# Patient Record
Sex: FEMALE | Race: WHITE | Employment: OTHER | URBAN - METROPOLITAN AREA
[De-identification: names, ages, dates, MRNs, and addresses within clinical notes are randomized per-mention and may not be internally consistent; named-entity substitution may affect disease eponyms.]

---

## 2018-09-10 ENCOUNTER — HOSPITAL ENCOUNTER (EMERGENCY)
Age: 83
Discharge: HOME | End: 2018-09-10
Attending: EMERGENCY MEDICINE
Payer: MEDICARE

## 2018-09-10 VITALS
HEART RATE: 68 BPM | DIASTOLIC BLOOD PRESSURE: 79 MMHG | TEMPERATURE: 98.3 F | RESPIRATION RATE: 20 BRPM | SYSTOLIC BLOOD PRESSURE: 140 MMHG | OXYGEN SATURATION: 97 %

## 2018-09-10 DIAGNOSIS — S76.011A STRAIN OF FLEXOR MUSCLE OF RIGHT HIP, INITIAL ENCOUNTER: Primary | ICD-10-CM

## 2018-09-10 PROCEDURE — 6370000000 HC RX 637 (ALT 250 FOR IP): Performed by: STUDENT IN AN ORGANIZED HEALTH CARE EDUCATION/TRAINING PROGRAM

## 2018-09-10 PROCEDURE — 99283 EMERGENCY DEPT VISIT LOW MDM: CPT

## 2018-09-10 RX ORDER — IBUPROFEN 400 MG/1
400 TABLET ORAL ONCE
Status: COMPLETED | OUTPATIENT
Start: 2018-09-10 | End: 2018-09-10

## 2018-09-10 RX ADMIN — IBUPROFEN 400 MG: 400 TABLET, FILM COATED ORAL at 11:08

## 2018-09-10 ASSESSMENT — PAIN DESCRIPTION - DESCRIPTORS: DESCRIPTORS: ACHING

## 2018-09-10 ASSESSMENT — PAIN DESCRIPTION - FREQUENCY: FREQUENCY: INTERMITTENT

## 2018-09-10 ASSESSMENT — PAIN SCALES - GENERAL
PAINLEVEL_OUTOF10: 7
PAINLEVEL_OUTOF10: 4
PAINLEVEL_OUTOF10: 8

## 2018-09-10 ASSESSMENT — PAIN DESCRIPTION - ORIENTATION: ORIENTATION: RIGHT

## 2018-09-10 ASSESSMENT — PAIN DESCRIPTION - LOCATION: LOCATION: KNEE

## 2018-09-10 NOTE — ED PROVIDER NOTES
ED Attending Attestation Note     Date of evaluation: 9/10/2018    This patient was seen by the resident. I have seen and examined the patient, agree with the workup, evaluation, management and diagnosis. The care plan has been discussed. My assessment reveals patient complaining right lateral thigh and knee pain, She denies any trauma or falls. On exam no pain ROM right hip and no swelling tenderness of knee. I spoke at length with pt's son and pt about x-rays of the hip/back and femur but they refused. It was decided (SDM) to treat pt symptomatically and have her follow up with her PCP.      Rashmi Smith MD  09/10/18 0746
regular rhythm, normal heart sounds and intact distal pulses. Pulmonary/Chest: Effort normal and breath sounds normal. She has no wheezes. She has no rales. Abdominal: Soft. She exhibits no distension. There is no tenderness. There is no rebound and no guarding. Musculoskeletal: She exhibits no edema or deformity. Normal range of motion in bilateral hips and knees. No pain currently. No pain with ROM of hip and knee. Negative log roll of bilateral lower extremities. Pelvis stable. No pain with weight bearing. Neurological:   - Alert and oriented to person, place, time and situation  - Symmetric 5/5 strength in the bilateral hip flexion, knee flex/ext, ankle dorsiflexion/plantarflexion  - Sensation to soft touch intact in the  lower extremities bilaterally  -  normal gait without ataxia       Skin: Skin is warm and dry. No rash noted. She is not diaphoretic. No erythema. No pallor. Psychiatric: She has a normal mood and affect. Her behavior is normal.       Diagnostic Results     EKG   Not done. RADIOLOGY:  No orders to display       LABS:   No results found for this visit on 09/10/18. ED BEDSIDE ULTRASOUND:  Not done    RECENT VITALS:  BP: (!) 140/79, Temp: 98.3 °F (36.8 °C), Pulse: 68, Resp: 20, SpO2: 97 %     Procedures     none    ED Course     Nursing Notes, Past Medical Hx, Past Surgical Hx, Social Hx, Allergies, and Family Hx were reviewed. The patient was given the following medications:  Orders Placed This Encounter   Medications    ibuprofen (ADVIL;MOTRIN) tablet 400 mg       CONSULTS:  None    MEDICAL DECISION MAKING / ASSESSMENT / PLAN     Izzy Turcios is a 80 y.o. female with a history and presentation as described above in HPI. The patient was evaluated by myself and the ED Attending Physician, No att. providers found. All management and disposition plans were discussed and agreed upon. Appropriate labs and diagnostic studies were reviewed as they were made available.  Pertinent

## 2019-02-26 ENCOUNTER — APPOINTMENT (OUTPATIENT)
Dept: CT IMAGING | Age: 84
DRG: 066 | End: 2019-02-26
Payer: MEDICARE

## 2019-02-26 ENCOUNTER — APPOINTMENT (OUTPATIENT)
Dept: MRI IMAGING | Age: 84
DRG: 066 | End: 2019-02-26
Payer: MEDICARE

## 2019-02-26 ENCOUNTER — APPOINTMENT (OUTPATIENT)
Dept: GENERAL RADIOLOGY | Age: 84
DRG: 066 | End: 2019-02-26
Payer: MEDICARE

## 2019-02-26 ENCOUNTER — HOSPITAL ENCOUNTER (INPATIENT)
Age: 84
LOS: 3 days | Discharge: HOME HEALTH | DRG: 066 | End: 2019-03-01
Attending: EMERGENCY MEDICINE | Admitting: INTERNAL MEDICINE
Payer: MEDICARE

## 2019-02-26 DIAGNOSIS — I16.0 HYPERTENSIVE URGENCY: ICD-10-CM

## 2019-02-26 DIAGNOSIS — I63.81 CEREBROVASCULAR ACCIDENT (CVA) OF RIGHT THALAMUS (HCC): ICD-10-CM

## 2019-02-26 DIAGNOSIS — I67.9 CEREBROVASCULAR DISEASE: ICD-10-CM

## 2019-02-26 DIAGNOSIS — I10 ESSENTIAL HYPERTENSION: ICD-10-CM

## 2019-02-26 DIAGNOSIS — R26.9 ABNORMAL GAIT: Primary | ICD-10-CM

## 2019-02-26 DIAGNOSIS — I65.22 INTERNAL CAROTID ARTERY STENOSIS, LEFT: ICD-10-CM

## 2019-02-26 LAB
AMORPHOUS: ABNORMAL /HPF
ANION GAP SERPL CALCULATED.3IONS-SCNC: 11 MMOL/L (ref 3–16)
APTT: 34.1 SEC (ref 26–36)
BASOPHILS ABSOLUTE: 0.1 K/UL (ref 0–0.2)
BASOPHILS RELATIVE PERCENT: 1.2 %
BILIRUBIN URINE: NEGATIVE
BLOOD, URINE: NEGATIVE
BUN BLDV-MCNC: 20 MG/DL (ref 7–20)
CALCIUM SERPL-MCNC: 8.9 MG/DL (ref 8.3–10.6)
CHLORIDE BLD-SCNC: 106 MMOL/L (ref 99–110)
CLARITY: CLEAR
CO2: 24 MMOL/L (ref 21–32)
COLOR: YELLOW
CREAT SERPL-MCNC: 0.8 MG/DL (ref 0.6–1.2)
EKG ATRIAL RATE: 55 BPM
EKG DIAGNOSIS: NORMAL
EKG P AXIS: 47 DEGREES
EKG P-R INTERVAL: 172 MS
EKG Q-T INTERVAL: 438 MS
EKG QRS DURATION: 92 MS
EKG QTC CALCULATION (BAZETT): 419 MS
EKG R AXIS: 39 DEGREES
EKG T AXIS: 58 DEGREES
EKG VENTRICULAR RATE: 55 BPM
EOSINOPHILS ABSOLUTE: 0.2 K/UL (ref 0–0.6)
EOSINOPHILS RELATIVE PERCENT: 2.8 %
GFR AFRICAN AMERICAN: >60
GFR NON-AFRICAN AMERICAN: >60
GLUCOSE BLD-MCNC: 109 MG/DL (ref 70–99)
GLUCOSE URINE: NEGATIVE MG/DL
HCT VFR BLD CALC: 41.1 % (ref 36–48)
HEMOGLOBIN: 13.7 G/DL (ref 12–16)
INR BLD: 1.04 (ref 0.86–1.14)
KETONES, URINE: NEGATIVE MG/DL
LEUKOCYTE ESTERASE, URINE: ABNORMAL
LYMPHOCYTES ABSOLUTE: 0.9 K/UL (ref 1–5.1)
LYMPHOCYTES RELATIVE PERCENT: 16.1 %
MCH RBC QN AUTO: 30.6 PG (ref 26–34)
MCHC RBC AUTO-ENTMCNC: 33.2 G/DL (ref 31–36)
MCV RBC AUTO: 92 FL (ref 80–100)
MICROSCOPIC EXAMINATION: YES
MONOCYTES ABSOLUTE: 0.9 K/UL (ref 0–1.3)
MONOCYTES RELATIVE PERCENT: 16.5 %
NEUTROPHILS ABSOLUTE: 3.5 K/UL (ref 1.7–7.7)
NEUTROPHILS RELATIVE PERCENT: 63.4 %
NITRITE, URINE: NEGATIVE
PDW BLD-RTO: 14.2 % (ref 12.4–15.4)
PH UA: 6
PLATELET # BLD: 338 K/UL (ref 135–450)
PMV BLD AUTO: 8.5 FL (ref 5–10.5)
POTASSIUM SERPL-SCNC: 4.1 MMOL/L (ref 3.5–5.1)
PROTEIN UA: NEGATIVE MG/DL
PROTHROMBIN TIME: 11.9 SEC (ref 9.8–13)
RBC # BLD: 4.47 M/UL (ref 4–5.2)
RBC UA: ABNORMAL /HPF (ref 0–2)
SODIUM BLD-SCNC: 141 MMOL/L (ref 136–145)
SPECIFIC GRAVITY UA: 1.01
URINE TYPE: ABNORMAL
UROBILINOGEN, URINE: 0.2 E.U./DL
WBC # BLD: 5.5 K/UL (ref 4–11)
WBC UA: ABNORMAL /HPF (ref 0–5)

## 2019-02-26 PROCEDURE — 96374 THER/PROPH/DIAG INJ IV PUSH: CPT

## 2019-02-26 PROCEDURE — 81001 URINALYSIS AUTO W/SCOPE: CPT

## 2019-02-26 PROCEDURE — 70450 CT HEAD/BRAIN W/O DYE: CPT

## 2019-02-26 PROCEDURE — 2500000003 HC RX 250 WO HCPCS: Performed by: STUDENT IN AN ORGANIZED HEALTH CARE EDUCATION/TRAINING PROGRAM

## 2019-02-26 PROCEDURE — 85025 COMPLETE CBC W/AUTO DIFF WBC: CPT

## 2019-02-26 PROCEDURE — 6360000002 HC RX W HCPCS: Performed by: PHYSICIAN ASSISTANT

## 2019-02-26 PROCEDURE — 70498 CT ANGIOGRAPHY NECK: CPT

## 2019-02-26 PROCEDURE — 85730 THROMBOPLASTIN TIME PARTIAL: CPT

## 2019-02-26 PROCEDURE — 6360000004 HC RX CONTRAST MEDICATION: Performed by: EMERGENCY MEDICINE

## 2019-02-26 PROCEDURE — 6370000000 HC RX 637 (ALT 250 FOR IP): Performed by: STUDENT IN AN ORGANIZED HEALTH CARE EDUCATION/TRAINING PROGRAM

## 2019-02-26 PROCEDURE — 6360000002 HC RX W HCPCS: Performed by: STUDENT IN AN ORGANIZED HEALTH CARE EDUCATION/TRAINING PROGRAM

## 2019-02-26 PROCEDURE — 80048 BASIC METABOLIC PNL TOTAL CA: CPT

## 2019-02-26 PROCEDURE — 71046 X-RAY EXAM CHEST 2 VIEWS: CPT

## 2019-02-26 PROCEDURE — 70496 CT ANGIOGRAPHY HEAD: CPT

## 2019-02-26 PROCEDURE — 99223 1ST HOSP IP/OBS HIGH 75: CPT | Performed by: SURGERY

## 2019-02-26 PROCEDURE — 99285 EMERGENCY DEPT VISIT HI MDM: CPT

## 2019-02-26 PROCEDURE — 70551 MRI BRAIN STEM W/O DYE: CPT

## 2019-02-26 PROCEDURE — 85610 PROTHROMBIN TIME: CPT

## 2019-02-26 PROCEDURE — 93005 ELECTROCARDIOGRAM TRACING: CPT | Performed by: PHYSICIAN ASSISTANT

## 2019-02-26 PROCEDURE — 2000000000 HC ICU R&B

## 2019-02-26 RX ORDER — IBUPROFEN 400 MG/1
400 TABLET ORAL NIGHTLY PRN
Status: ON HOLD | COMMUNITY
End: 2019-02-28 | Stop reason: HOSPADM

## 2019-02-26 RX ORDER — ASPIRIN 81 MG/1
81 TABLET ORAL DAILY
Status: DISCONTINUED | OUTPATIENT
Start: 2019-02-27 | End: 2019-03-01 | Stop reason: HOSPADM

## 2019-02-26 RX ORDER — SODIUM CHLORIDE 0.9 % (FLUSH) 0.9 %
10 SYRINGE (ML) INJECTION EVERY 12 HOURS SCHEDULED
Status: DISCONTINUED | OUTPATIENT
Start: 2019-02-26 | End: 2019-03-01 | Stop reason: HOSPADM

## 2019-02-26 RX ORDER — ONDANSETRON 2 MG/ML
4 INJECTION INTRAMUSCULAR; INTRAVENOUS EVERY 6 HOURS PRN
Status: DISCONTINUED | OUTPATIENT
Start: 2019-02-26 | End: 2019-03-01 | Stop reason: HOSPADM

## 2019-02-26 RX ORDER — ATORVASTATIN CALCIUM 40 MG/1
40 TABLET, FILM COATED ORAL NIGHTLY
Status: DISCONTINUED | OUTPATIENT
Start: 2019-02-26 | End: 2019-03-01 | Stop reason: HOSPADM

## 2019-02-26 RX ORDER — SODIUM CHLORIDE 0.9 % (FLUSH) 0.9 %
10 SYRINGE (ML) INJECTION PRN
Status: DISCONTINUED | OUTPATIENT
Start: 2019-02-26 | End: 2019-03-01 | Stop reason: HOSPADM

## 2019-02-26 RX ORDER — CLOPIDOGREL BISULFATE 75 MG/1
75 TABLET ORAL DAILY
Status: DISCONTINUED | OUTPATIENT
Start: 2019-02-26 | End: 2019-03-01 | Stop reason: HOSPADM

## 2019-02-26 RX ORDER — HYDRALAZINE HYDROCHLORIDE 20 MG/ML
2.5 INJECTION INTRAMUSCULAR; INTRAVENOUS ONCE
Status: COMPLETED | OUTPATIENT
Start: 2019-02-26 | End: 2019-02-26

## 2019-02-26 RX ORDER — LABETALOL HYDROCHLORIDE 5 MG/ML
5 INJECTION, SOLUTION INTRAVENOUS EVERY 6 HOURS PRN
Status: DISCONTINUED | OUTPATIENT
Start: 2019-02-26 | End: 2019-02-27

## 2019-02-26 RX ADMIN — IOPAMIDOL 80 ML: 755 INJECTION, SOLUTION INTRAVENOUS at 12:52

## 2019-02-26 RX ADMIN — LABETALOL 20 MG/4 ML (5 MG/ML) INTRAVENOUS SYRINGE 5 MG: at 17:29

## 2019-02-26 RX ADMIN — HYDRALAZINE HYDROCHLORIDE 3 MG: 20 INJECTION INTRAMUSCULAR; INTRAVENOUS at 14:56

## 2019-02-26 RX ADMIN — CLOPIDOGREL BISULFATE 75 MG: 75 TABLET ORAL at 18:40

## 2019-02-26 RX ADMIN — ENOXAPARIN SODIUM 40 MG: 40 INJECTION SUBCUTANEOUS at 17:04

## 2019-02-26 RX ADMIN — ATORVASTATIN CALCIUM 40 MG: 40 TABLET, FILM COATED ORAL at 21:19

## 2019-02-26 ASSESSMENT — ENCOUNTER SYMPTOMS
ABDOMINAL DISTENTION: 0
RHINORRHEA: 0
SORE THROAT: 0
ABDOMINAL PAIN: 0
NAUSEA: 0
WHEEZING: 0
TROUBLE SWALLOWING: 0
VOMITING: 0
COUGH: 0
CHEST TIGHTNESS: 0
BACK PAIN: 0
SHORTNESS OF BREATH: 0
VOICE CHANGE: 0
SINUS PAIN: 0

## 2019-02-26 ASSESSMENT — PAIN SCALES - GENERAL: PAINLEVEL_OUTOF10: 0

## 2019-02-27 PROBLEM — I63.81 CEREBROVASCULAR ACCIDENT (CVA) OF RIGHT THALAMUS (HCC): Status: ACTIVE | Noted: 2019-02-27

## 2019-02-27 LAB
ANION GAP SERPL CALCULATED.3IONS-SCNC: 14 MMOL/L (ref 3–16)
BASOPHILS ABSOLUTE: 0 K/UL (ref 0–0.2)
BASOPHILS RELATIVE PERCENT: 0.9 %
BUN BLDV-MCNC: 19 MG/DL (ref 7–20)
CALCIUM SERPL-MCNC: 8.8 MG/DL (ref 8.3–10.6)
CHLORIDE BLD-SCNC: 103 MMOL/L (ref 99–110)
CO2: 23 MMOL/L (ref 21–32)
CREAT SERPL-MCNC: 0.8 MG/DL (ref 0.6–1.2)
EOSINOPHILS ABSOLUTE: 0.2 K/UL (ref 0–0.6)
EOSINOPHILS RELATIVE PERCENT: 2.8 %
GFR AFRICAN AMERICAN: >60
GFR NON-AFRICAN AMERICAN: >60
GLUCOSE BLD-MCNC: 124 MG/DL (ref 70–99)
HCT VFR BLD CALC: 40.9 % (ref 36–48)
HEMOGLOBIN: 13.9 G/DL (ref 12–16)
LV EF: 58 %
LVEF MODALITY: NORMAL
LYMPHOCYTES ABSOLUTE: 0.9 K/UL (ref 1–5.1)
LYMPHOCYTES RELATIVE PERCENT: 16.5 %
MCH RBC QN AUTO: 31.3 PG (ref 26–34)
MCHC RBC AUTO-ENTMCNC: 33.9 G/DL (ref 31–36)
MCV RBC AUTO: 92.2 FL (ref 80–100)
MONOCYTES ABSOLUTE: 0.9 K/UL (ref 0–1.3)
MONOCYTES RELATIVE PERCENT: 16.5 %
NEUTROPHILS ABSOLUTE: 3.5 K/UL (ref 1.7–7.7)
NEUTROPHILS RELATIVE PERCENT: 63.3 %
PDW BLD-RTO: 14.1 % (ref 12.4–15.4)
PLATELET # BLD: 338 K/UL (ref 135–450)
PMV BLD AUTO: 8.4 FL (ref 5–10.5)
POTASSIUM REFLEX MAGNESIUM: 4 MMOL/L (ref 3.5–5.1)
RBC # BLD: 4.43 M/UL (ref 4–5.2)
SODIUM BLD-SCNC: 140 MMOL/L (ref 136–145)
WBC # BLD: 5.5 K/UL (ref 4–11)

## 2019-02-27 PROCEDURE — C8929 TTE W OR WO FOL WCON,DOPPLER: HCPCS

## 2019-02-27 PROCEDURE — 6360000002 HC RX W HCPCS: Performed by: STUDENT IN AN ORGANIZED HEALTH CARE EDUCATION/TRAINING PROGRAM

## 2019-02-27 PROCEDURE — 6370000000 HC RX 637 (ALT 250 FOR IP): Performed by: STUDENT IN AN ORGANIZED HEALTH CARE EDUCATION/TRAINING PROGRAM

## 2019-02-27 PROCEDURE — 2580000003 HC RX 258: Performed by: STUDENT IN AN ORGANIZED HEALTH CARE EDUCATION/TRAINING PROGRAM

## 2019-02-27 PROCEDURE — 99233 SBSQ HOSP IP/OBS HIGH 50: CPT | Performed by: INTERNAL MEDICINE

## 2019-02-27 PROCEDURE — 85025 COMPLETE CBC W/AUTO DIFF WBC: CPT

## 2019-02-27 PROCEDURE — 2500000003 HC RX 250 WO HCPCS: Performed by: STUDENT IN AN ORGANIZED HEALTH CARE EDUCATION/TRAINING PROGRAM

## 2019-02-27 PROCEDURE — 2000000000 HC ICU R&B

## 2019-02-27 PROCEDURE — 80048 BASIC METABOLIC PNL TOTAL CA: CPT

## 2019-02-27 RX ORDER — LISINOPRIL 10 MG/1
10 TABLET ORAL DAILY
Status: DISCONTINUED | OUTPATIENT
Start: 2019-02-28 | End: 2019-02-28

## 2019-02-27 RX ORDER — LABETALOL HYDROCHLORIDE 5 MG/ML
10 INJECTION, SOLUTION INTRAVENOUS ONCE
Status: DISCONTINUED | OUTPATIENT
Start: 2019-02-27 | End: 2019-02-28

## 2019-02-27 RX ORDER — LISINOPRIL 5 MG/1
5 TABLET ORAL ONCE
Status: COMPLETED | OUTPATIENT
Start: 2019-02-27 | End: 2019-02-27

## 2019-02-27 RX ORDER — LISINOPRIL 5 MG/1
5 TABLET ORAL DAILY
Status: DISCONTINUED | OUTPATIENT
Start: 2019-02-27 | End: 2019-02-27

## 2019-02-27 RX ORDER — LABETALOL HYDROCHLORIDE 5 MG/ML
5 INJECTION, SOLUTION INTRAVENOUS EVERY 6 HOURS PRN
Status: DISCONTINUED | OUTPATIENT
Start: 2019-02-27 | End: 2019-02-28

## 2019-02-27 RX ADMIN — ENOXAPARIN SODIUM 40 MG: 40 INJECTION SUBCUTANEOUS at 08:38

## 2019-02-27 RX ADMIN — LABETALOL 20 MG/4 ML (5 MG/ML) INTRAVENOUS SYRINGE 5 MG: at 13:41

## 2019-02-27 RX ADMIN — ASPIRIN 81 MG: 81 TABLET, COATED ORAL at 08:38

## 2019-02-27 RX ADMIN — LABETALOL 20 MG/4 ML (5 MG/ML) INTRAVENOUS SYRINGE 5 MG: at 07:53

## 2019-02-27 RX ADMIN — CLOPIDOGREL BISULFATE 75 MG: 75 TABLET ORAL at 08:38

## 2019-02-27 RX ADMIN — Medication 10 ML: at 07:53

## 2019-02-27 RX ADMIN — ATORVASTATIN CALCIUM 40 MG: 40 TABLET, FILM COATED ORAL at 20:18

## 2019-02-27 RX ADMIN — LISINOPRIL 5 MG: 5 TABLET ORAL at 11:46

## 2019-02-27 RX ADMIN — LISINOPRIL 5 MG: 5 TABLET ORAL at 17:32

## 2019-02-27 RX ADMIN — LABETALOL 20 MG/4 ML (5 MG/ML) INTRAVENOUS SYRINGE 5 MG: at 20:19

## 2019-02-27 ASSESSMENT — ENCOUNTER SYMPTOMS
BACK PAIN: 1
TROUBLE SWALLOWING: 0
ABDOMINAL PAIN: 0
SHORTNESS OF BREATH: 0
WHEEZING: 0
ABDOMINAL DISTENTION: 0
CONSTIPATION: 0
COUGH: 0
VOICE CHANGE: 0
DIARRHEA: 0
NAUSEA: 0
VOMITING: 0

## 2019-02-27 ASSESSMENT — PAIN SCALES - GENERAL
PAINLEVEL_OUTOF10: 0

## 2019-02-28 PROBLEM — I65.29 CAROTID STENOSIS: Status: ACTIVE | Noted: 2019-02-28

## 2019-02-28 LAB
ANION GAP SERPL CALCULATED.3IONS-SCNC: 14 MMOL/L (ref 3–16)
BASOPHILS ABSOLUTE: 0 K/UL (ref 0–0.2)
BASOPHILS RELATIVE PERCENT: 0.8 %
BUN BLDV-MCNC: 25 MG/DL (ref 7–20)
CALCIUM SERPL-MCNC: 8.5 MG/DL (ref 8.3–10.6)
CHLORIDE BLD-SCNC: 108 MMOL/L (ref 99–110)
CHOLESTEROL, TOTAL: 185 MG/DL (ref 0–199)
CO2: 23 MMOL/L (ref 21–32)
CREAT SERPL-MCNC: 0.8 MG/DL (ref 0.6–1.2)
EOSINOPHILS ABSOLUTE: 0.2 K/UL (ref 0–0.6)
EOSINOPHILS RELATIVE PERCENT: 3.9 %
GFR AFRICAN AMERICAN: >60
GFR NON-AFRICAN AMERICAN: >60
GLUCOSE BLD-MCNC: 116 MG/DL (ref 70–99)
HCT VFR BLD CALC: 39.6 % (ref 36–48)
HDLC SERPL-MCNC: 54 MG/DL (ref 40–60)
HEMOGLOBIN: 13.2 G/DL (ref 12–16)
LDL CHOLESTEROL CALCULATED: 108 MG/DL
LYMPHOCYTES ABSOLUTE: 1 K/UL (ref 1–5.1)
LYMPHOCYTES RELATIVE PERCENT: 18.4 %
MCH RBC QN AUTO: 30.9 PG (ref 26–34)
MCHC RBC AUTO-ENTMCNC: 33.2 G/DL (ref 31–36)
MCV RBC AUTO: 92.9 FL (ref 80–100)
MONOCYTES ABSOLUTE: 1 K/UL (ref 0–1.3)
MONOCYTES RELATIVE PERCENT: 17.9 %
NEUTROPHILS ABSOLUTE: 3.3 K/UL (ref 1.7–7.7)
NEUTROPHILS RELATIVE PERCENT: 59 %
PDW BLD-RTO: 14.8 % (ref 12.4–15.4)
PLATELET # BLD: 326 K/UL (ref 135–450)
PMV BLD AUTO: 8.4 FL (ref 5–10.5)
POTASSIUM REFLEX MAGNESIUM: 4 MMOL/L (ref 3.5–5.1)
RBC # BLD: 4.26 M/UL (ref 4–5.2)
SODIUM BLD-SCNC: 145 MMOL/L (ref 136–145)
TRIGL SERPL-MCNC: 116 MG/DL (ref 0–150)
VLDLC SERPL CALC-MCNC: 23 MG/DL
WBC # BLD: 5.6 K/UL (ref 4–11)

## 2019-02-28 PROCEDURE — 6370000000 HC RX 637 (ALT 250 FOR IP): Performed by: STUDENT IN AN ORGANIZED HEALTH CARE EDUCATION/TRAINING PROGRAM

## 2019-02-28 PROCEDURE — 85025 COMPLETE CBC W/AUTO DIFF WBC: CPT

## 2019-02-28 PROCEDURE — 97530 THERAPEUTIC ACTIVITIES: CPT

## 2019-02-28 PROCEDURE — 94760 N-INVAS EAR/PLS OXIMETRY 1: CPT

## 2019-02-28 PROCEDURE — 6360000002 HC RX W HCPCS: Performed by: STUDENT IN AN ORGANIZED HEALTH CARE EDUCATION/TRAINING PROGRAM

## 2019-02-28 PROCEDURE — 97116 GAIT TRAINING THERAPY: CPT

## 2019-02-28 PROCEDURE — 2580000003 HC RX 258: Performed by: STUDENT IN AN ORGANIZED HEALTH CARE EDUCATION/TRAINING PROGRAM

## 2019-02-28 PROCEDURE — 2500000003 HC RX 250 WO HCPCS: Performed by: STUDENT IN AN ORGANIZED HEALTH CARE EDUCATION/TRAINING PROGRAM

## 2019-02-28 PROCEDURE — 97166 OT EVAL MOD COMPLEX 45 MIN: CPT

## 2019-02-28 PROCEDURE — 97162 PT EVAL MOD COMPLEX 30 MIN: CPT

## 2019-02-28 PROCEDURE — 97535 SELF CARE MNGMENT TRAINING: CPT

## 2019-02-28 PROCEDURE — 6370000000 HC RX 637 (ALT 250 FOR IP): Performed by: FAMILY MEDICINE

## 2019-02-28 PROCEDURE — 99232 SBSQ HOSP IP/OBS MODERATE 35: CPT | Performed by: INTERNAL MEDICINE

## 2019-02-28 PROCEDURE — 1200000000 HC SEMI PRIVATE

## 2019-02-28 PROCEDURE — 80061 LIPID PANEL: CPT

## 2019-02-28 PROCEDURE — 99223 1ST HOSP IP/OBS HIGH 75: CPT | Performed by: INTERNAL MEDICINE

## 2019-02-28 PROCEDURE — 80048 BASIC METABOLIC PNL TOTAL CA: CPT

## 2019-02-28 RX ORDER — AMLODIPINE BESYLATE 10 MG/1
10 TABLET ORAL DAILY
Qty: 30 TABLET | Refills: 3 | Status: SHIPPED | OUTPATIENT
Start: 2019-03-01 | End: 2019-02-28

## 2019-02-28 RX ORDER — AMLODIPINE BESYLATE 10 MG/1
10 TABLET ORAL DAILY
Qty: 30 TABLET | Refills: 3 | Status: SHIPPED | OUTPATIENT
Start: 2019-03-01

## 2019-02-28 RX ORDER — CLOPIDOGREL BISULFATE 75 MG/1
75 TABLET ORAL DAILY
Qty: 30 TABLET | Refills: 0 | Status: ON HOLD | OUTPATIENT
Start: 2019-03-01 | End: 2021-08-16 | Stop reason: SDUPTHER

## 2019-02-28 RX ORDER — AMLODIPINE BESYLATE 10 MG/1
10 TABLET ORAL DAILY
Status: DISCONTINUED | OUTPATIENT
Start: 2019-02-28 | End: 2019-03-01 | Stop reason: HOSPADM

## 2019-02-28 RX ORDER — ASPIRIN 81 MG/1
81 TABLET ORAL DAILY
Qty: 30 TABLET | Refills: 3 | Status: SHIPPED | OUTPATIENT
Start: 2019-03-01 | End: 2019-02-28

## 2019-02-28 RX ORDER — ATORVASTATIN CALCIUM 40 MG/1
40 TABLET, FILM COATED ORAL NIGHTLY
Qty: 30 TABLET | Refills: 3 | Status: SHIPPED | OUTPATIENT
Start: 2019-02-28

## 2019-02-28 RX ORDER — CLOPIDOGREL BISULFATE 75 MG/1
75 TABLET ORAL DAILY
Qty: 30 TABLET | Refills: 0 | Status: SHIPPED | OUTPATIENT
Start: 2019-03-01 | End: 2019-02-28

## 2019-02-28 RX ORDER — ATORVASTATIN CALCIUM 40 MG/1
40 TABLET, FILM COATED ORAL NIGHTLY
Qty: 30 TABLET | Refills: 3 | Status: SHIPPED | OUTPATIENT
Start: 2019-02-28 | End: 2019-02-28

## 2019-02-28 RX ORDER — LABETALOL HYDROCHLORIDE 5 MG/ML
10 INJECTION, SOLUTION INTRAVENOUS EVERY 6 HOURS PRN
Status: DISCONTINUED | OUTPATIENT
Start: 2019-02-28 | End: 2019-03-01 | Stop reason: HOSPADM

## 2019-02-28 RX ORDER — ASPIRIN 81 MG/1
81 TABLET ORAL DAILY
Qty: 30 TABLET | Refills: 3 | Status: SHIPPED | OUTPATIENT
Start: 2019-03-01

## 2019-02-28 RX ADMIN — LABETALOL 20 MG/4 ML (5 MG/ML) INTRAVENOUS SYRINGE 5 MG: at 08:43

## 2019-02-28 RX ADMIN — ATORVASTATIN CALCIUM 40 MG: 40 TABLET, FILM COATED ORAL at 20:30

## 2019-02-28 RX ADMIN — METOPROLOL TARTRATE 25 MG: 25 TABLET ORAL at 10:32

## 2019-02-28 RX ADMIN — ENOXAPARIN SODIUM 40 MG: 40 INJECTION SUBCUTANEOUS at 07:54

## 2019-02-28 RX ADMIN — CLOPIDOGREL BISULFATE 75 MG: 75 TABLET ORAL at 07:55

## 2019-02-28 RX ADMIN — Medication 10 ML: at 07:54

## 2019-02-28 RX ADMIN — LISINOPRIL 10 MG: 10 TABLET ORAL at 07:55

## 2019-02-28 RX ADMIN — ASPIRIN 81 MG: 81 TABLET, COATED ORAL at 07:55

## 2019-02-28 RX ADMIN — METOPROLOL TARTRATE 25 MG: 25 TABLET ORAL at 20:30

## 2019-02-28 RX ADMIN — AMLODIPINE BESYLATE 10 MG: 10 TABLET ORAL at 13:42

## 2019-02-28 ASSESSMENT — PAIN SCALES - GENERAL
PAINLEVEL_OUTOF10: 0

## 2019-02-28 ASSESSMENT — ENCOUNTER SYMPTOMS
COUGH: 0
NAUSEA: 0
ABDOMINAL PAIN: 0
CHEST TIGHTNESS: 0
DIARRHEA: 0
CONSTIPATION: 0
SHORTNESS OF BREATH: 0

## 2019-03-01 VITALS
SYSTOLIC BLOOD PRESSURE: 161 MMHG | HEART RATE: 58 BPM | BODY MASS INDEX: 23.42 KG/M2 | DIASTOLIC BLOOD PRESSURE: 62 MMHG | TEMPERATURE: 97.7 F | RESPIRATION RATE: 16 BRPM | HEIGHT: 68 IN | WEIGHT: 154.54 LBS | OXYGEN SATURATION: 95 %

## 2019-03-01 LAB
ANION GAP SERPL CALCULATED.3IONS-SCNC: 13 MMOL/L (ref 3–16)
BASOPHILS ABSOLUTE: 0 K/UL (ref 0–0.2)
BASOPHILS RELATIVE PERCENT: 0.6 %
BUN BLDV-MCNC: 26 MG/DL (ref 7–20)
CALCIUM SERPL-MCNC: 9.1 MG/DL (ref 8.3–10.6)
CHLORIDE BLD-SCNC: 106 MMOL/L (ref 99–110)
CO2: 22 MMOL/L (ref 21–32)
CREAT SERPL-MCNC: 0.9 MG/DL (ref 0.6–1.2)
EOSINOPHILS ABSOLUTE: 0.3 K/UL (ref 0–0.6)
EOSINOPHILS RELATIVE PERCENT: 4.3 %
GFR AFRICAN AMERICAN: >60
GFR NON-AFRICAN AMERICAN: 59
GLUCOSE BLD-MCNC: 118 MG/DL (ref 70–99)
HCT VFR BLD CALC: 42.4 % (ref 36–48)
HEMOGLOBIN: 14 G/DL (ref 12–16)
LYMPHOCYTES ABSOLUTE: 1.1 K/UL (ref 1–5.1)
LYMPHOCYTES RELATIVE PERCENT: 15.5 %
MCH RBC QN AUTO: 30.8 PG (ref 26–34)
MCHC RBC AUTO-ENTMCNC: 33 G/DL (ref 31–36)
MCV RBC AUTO: 93.5 FL (ref 80–100)
MONOCYTES ABSOLUTE: 1 K/UL (ref 0–1.3)
MONOCYTES RELATIVE PERCENT: 14.9 %
NEUTROPHILS ABSOLUTE: 4.4 K/UL (ref 1.7–7.7)
NEUTROPHILS RELATIVE PERCENT: 64.7 %
PDW BLD-RTO: 14.3 % (ref 12.4–15.4)
PLATELET # BLD: 353 K/UL (ref 135–450)
PMV BLD AUTO: 8.5 FL (ref 5–10.5)
POTASSIUM REFLEX MAGNESIUM: 4.3 MMOL/L (ref 3.5–5.1)
RBC # BLD: 4.53 M/UL (ref 4–5.2)
SODIUM BLD-SCNC: 141 MMOL/L (ref 136–145)
WBC # BLD: 6.8 K/UL (ref 4–11)

## 2019-03-01 PROCEDURE — 97116 GAIT TRAINING THERAPY: CPT

## 2019-03-01 PROCEDURE — 85025 COMPLETE CBC W/AUTO DIFF WBC: CPT

## 2019-03-01 PROCEDURE — 6370000000 HC RX 637 (ALT 250 FOR IP): Performed by: FAMILY MEDICINE

## 2019-03-01 PROCEDURE — 2580000003 HC RX 258: Performed by: STUDENT IN AN ORGANIZED HEALTH CARE EDUCATION/TRAINING PROGRAM

## 2019-03-01 PROCEDURE — 97535 SELF CARE MNGMENT TRAINING: CPT

## 2019-03-01 PROCEDURE — 6370000000 HC RX 637 (ALT 250 FOR IP): Performed by: STUDENT IN AN ORGANIZED HEALTH CARE EDUCATION/TRAINING PROGRAM

## 2019-03-01 PROCEDURE — 6360000002 HC RX W HCPCS: Performed by: STUDENT IN AN ORGANIZED HEALTH CARE EDUCATION/TRAINING PROGRAM

## 2019-03-01 PROCEDURE — 97530 THERAPEUTIC ACTIVITIES: CPT

## 2019-03-01 PROCEDURE — 80048 BASIC METABOLIC PNL TOTAL CA: CPT

## 2019-03-01 RX ORDER — LISINOPRIL 10 MG/1
10 TABLET ORAL DAILY
Qty: 30 TABLET | Refills: 3 | Status: SHIPPED | OUTPATIENT
Start: 2019-03-02 | End: 2022-04-25

## 2019-03-01 RX ORDER — LISINOPRIL 10 MG/1
10 TABLET ORAL DAILY
Status: DISCONTINUED | OUTPATIENT
Start: 2019-03-01 | End: 2019-03-01 | Stop reason: HOSPADM

## 2019-03-01 RX ADMIN — METOPROLOL TARTRATE 25 MG: 25 TABLET ORAL at 09:20

## 2019-03-01 RX ADMIN — CLOPIDOGREL BISULFATE 75 MG: 75 TABLET ORAL at 09:15

## 2019-03-01 RX ADMIN — LISINOPRIL 10 MG: 10 TABLET ORAL at 10:30

## 2019-03-01 RX ADMIN — ENOXAPARIN SODIUM 40 MG: 40 INJECTION SUBCUTANEOUS at 09:14

## 2019-03-01 RX ADMIN — Medication 10 ML: at 09:26

## 2019-03-01 RX ADMIN — ASPIRIN 81 MG: 81 TABLET, COATED ORAL at 09:15

## 2019-03-01 RX ADMIN — AMLODIPINE BESYLATE 10 MG: 10 TABLET ORAL at 09:15

## 2019-03-01 ASSESSMENT — PAIN SCALES - GENERAL
PAINLEVEL_OUTOF10: 0

## 2019-03-21 ENCOUNTER — OFFICE VISIT (OUTPATIENT)
Dept: CARDIOLOGY CLINIC | Age: 84
End: 2019-03-21
Payer: MEDICARE

## 2019-03-21 VITALS
WEIGHT: 153.6 LBS | SYSTOLIC BLOOD PRESSURE: 122 MMHG | DIASTOLIC BLOOD PRESSURE: 60 MMHG | BODY MASS INDEX: 23.7 KG/M2 | HEART RATE: 58 BPM

## 2019-03-21 DIAGNOSIS — Z01.810 PRE-OPERATIVE CARDIOVASCULAR EXAMINATION, HIGH RISK SURGERY: ICD-10-CM

## 2019-03-21 DIAGNOSIS — I65.29 STENOSIS OF CAROTID ARTERY, UNSPECIFIED LATERALITY: ICD-10-CM

## 2019-03-21 DIAGNOSIS — I63.81 CEREBROVASCULAR ACCIDENT (CVA) OF RIGHT THALAMUS (HCC): ICD-10-CM

## 2019-03-21 DIAGNOSIS — I16.0 HYPERTENSIVE URGENCY: Primary | ICD-10-CM

## 2019-03-21 PROCEDURE — G8484 FLU IMMUNIZE NO ADMIN: HCPCS | Performed by: NURSE PRACTITIONER

## 2019-03-21 PROCEDURE — G8598 ASA/ANTIPLAT THER USED: HCPCS | Performed by: NURSE PRACTITIONER

## 2019-03-21 PROCEDURE — G8428 CUR MEDS NOT DOCUMENT: HCPCS | Performed by: NURSE PRACTITIONER

## 2019-03-21 PROCEDURE — 99214 OFFICE O/P EST MOD 30 MIN: CPT | Performed by: NURSE PRACTITIONER

## 2019-03-21 PROCEDURE — G8420 CALC BMI NORM PARAMETERS: HCPCS | Performed by: NURSE PRACTITIONER

## 2019-03-21 PROCEDURE — 1090F PRES/ABSN URINE INCON ASSESS: CPT | Performed by: NURSE PRACTITIONER

## 2019-03-21 PROCEDURE — 1101F PT FALLS ASSESS-DOCD LE1/YR: CPT | Performed by: NURSE PRACTITIONER

## 2019-03-21 PROCEDURE — 1036F TOBACCO NON-USER: CPT | Performed by: NURSE PRACTITIONER

## 2019-03-21 PROCEDURE — 1123F ACP DISCUSS/DSCN MKR DOCD: CPT | Performed by: NURSE PRACTITIONER

## 2019-03-21 PROCEDURE — 1111F DSCHRG MED/CURRENT MED MERGE: CPT | Performed by: NURSE PRACTITIONER

## 2019-03-21 PROCEDURE — 4040F PNEUMOC VAC/ADMIN/RCVD: CPT | Performed by: NURSE PRACTITIONER

## 2019-03-22 ENCOUNTER — HOSPITAL ENCOUNTER (OUTPATIENT)
Dept: NON INVASIVE DIAGNOSTICS | Age: 84
Discharge: HOME | End: 2019-03-22
Payer: MEDICARE

## 2019-03-22 ENCOUNTER — HOSPITAL ENCOUNTER (OUTPATIENT)
Dept: NUCLEAR MEDICINE | Age: 84
Discharge: HOME | End: 2019-03-22
Payer: MEDICARE

## 2019-03-22 DIAGNOSIS — I63.81 CEREBROVASCULAR ACCIDENT (CVA) OF RIGHT THALAMUS (HCC): ICD-10-CM

## 2019-03-22 DIAGNOSIS — I16.0 HYPERTENSIVE URGENCY: ICD-10-CM

## 2019-03-22 LAB
LV EF: 75 %
LVEF MODALITY: NORMAL

## 2019-03-22 PROCEDURE — 2580000003 HC RX 258: Performed by: FAMILY MEDICINE

## 2019-03-22 PROCEDURE — 6360000002 HC RX W HCPCS: Performed by: INTERNAL MEDICINE

## 2019-03-22 PROCEDURE — 78452 HT MUSCLE IMAGE SPECT MULT: CPT

## 2019-03-22 PROCEDURE — 3430000000 HC RX DIAGNOSTIC RADIOPHARMACEUTICAL: Performed by: FAMILY MEDICINE

## 2019-03-22 PROCEDURE — A9502 TC99M TETROFOSMIN: HCPCS | Performed by: FAMILY MEDICINE

## 2019-03-22 PROCEDURE — 93017 CV STRESS TEST TRACING ONLY: CPT

## 2019-03-22 RX ORDER — SODIUM CHLORIDE 0.9 % (FLUSH) 0.9 %
10 SYRINGE (ML) INJECTION PRN
Status: DISCONTINUED | OUTPATIENT
Start: 2019-03-22 | End: 2019-03-23 | Stop reason: HOSPADM

## 2019-03-22 RX ADMIN — Medication 10 ML: at 08:10

## 2019-03-22 RX ADMIN — TETROFOSMIN 10 MILLICURIE: 1.38 INJECTION, POWDER, LYOPHILIZED, FOR SOLUTION INTRAVENOUS at 08:10

## 2019-03-22 RX ADMIN — TETROFOSMIN 30 MILLICURIE: 1.38 INJECTION, POWDER, LYOPHILIZED, FOR SOLUTION INTRAVENOUS at 10:00

## 2019-03-22 RX ADMIN — REGADENOSON 0.4 MG: 0.08 INJECTION, SOLUTION INTRAVENOUS at 10:00

## 2019-03-22 RX ADMIN — Medication 10 ML: at 10:00

## 2019-04-17 ENCOUNTER — TELEPHONE (OUTPATIENT)
Dept: VASCULAR SURGERY | Age: 84
End: 2019-04-17

## 2019-04-17 ENCOUNTER — TELEPHONE (OUTPATIENT)
Dept: CARDIOLOGY CLINIC | Age: 84
End: 2019-04-17

## 2019-04-17 NOTE — TELEPHONE ENCOUNTER
----- Message from Mark Colorado MA sent at 4/17/2019  2:36 PM EDT -----  Regarding: [facility] follow up   Cerebrovascular accident, thrombotic,  (CVA) of right thalamus np appointment scheduled 4/25/19

## 2019-04-25 ENCOUNTER — OFFICE VISIT (OUTPATIENT)
Dept: VASCULAR SURGERY | Age: 84
End: 2019-04-25
Payer: MEDICARE

## 2019-04-25 VITALS — TEMPERATURE: 97.6 F | BODY MASS INDEX: 23.34 KG/M2 | WEIGHT: 154 LBS | HEIGHT: 68 IN

## 2019-04-25 DIAGNOSIS — I65.22 STENOSIS OF LEFT CAROTID ARTERY: ICD-10-CM

## 2019-04-25 DIAGNOSIS — I65.23 CAROTID STENOSIS, ASYMPTOMATIC, BILATERAL: Primary | ICD-10-CM

## 2019-04-25 PROCEDURE — 99215 OFFICE O/P EST HI 40 MIN: CPT | Performed by: SURGERY

## 2019-04-25 PROCEDURE — G8427 DOCREV CUR MEDS BY ELIG CLIN: HCPCS | Performed by: SURGERY

## 2019-04-25 PROCEDURE — G8420 CALC BMI NORM PARAMETERS: HCPCS | Performed by: SURGERY

## 2019-04-25 PROCEDURE — G8598 ASA/ANTIPLAT THER USED: HCPCS | Performed by: SURGERY

## 2019-04-25 PROCEDURE — 1123F ACP DISCUSS/DSCN MKR DOCD: CPT | Performed by: SURGERY

## 2019-04-25 PROCEDURE — 1090F PRES/ABSN URINE INCON ASSESS: CPT | Performed by: SURGERY

## 2019-04-25 PROCEDURE — 1036F TOBACCO NON-USER: CPT | Performed by: SURGERY

## 2019-04-25 PROCEDURE — 4040F PNEUMOC VAC/ADMIN/RCVD: CPT | Performed by: SURGERY

## 2019-04-25 RX ORDER — CLOPIDOGREL BISULFATE 75 MG/1
75 TABLET ORAL DAILY
Qty: 30 TABLET | Refills: 3 | Status: SHIPPED | OUTPATIENT
Start: 2019-04-25 | End: 2019-08-25 | Stop reason: SDUPTHER

## 2019-04-25 NOTE — PROGRESS NOTES
[facility] Vascular Surgery  Natalie Mitchell MD, [facility], [address]    OUTPATIENT CONSULTATION    Date of Consultation:  4/25/2019    PCP:  Allison Jules MD       Chief Complaint: Left carotid stenosis    History of Present Illness:   Larry Cortez is a 80 y.o. female who returns today of hospital follow-up of severe left carotid stenosis. She was admitted to [facility] 2/26/2019 with weakness in both legs. MRI of the brain indicated right thalamic stroke area workup also indicated severe greater than 90% left ICA stenosis, though she was not symptomatic in the left hemisphere. Blood pressure was also elevated to 121O systolic. Today she presents with her son. She is back home living independently with her  and both sons are in and out quite a bit to help them. She is walking without assistance, albeit slowly. She continues to complain of left arm and leg weakness and heaviness, but overall this is improved. She denies any right arm or leg symptoms. She denies difficulty speaking or swallowing, slurred speech, facial droop, visual changes, or any new symptoms that she was discharged from hospital.    She was discharged on aspirin, Plavix, and Lipitor, though she states that she has been recently told to stop the Plavix. She presents for further evaluation and management as indicated. Past Medical History:     Past Medical History:   Diagnosis Date    CVA (cerebral vascular accident) ([facility] [address] 03/2019    Hypertension        Past Surgical History:  History reviewed. No pertinent surgical history. Home Medications:   Prior to Admission medications    Medication Sig Start Date End Date Taking?  Authorizing Provider   lisinopril (PRINIVIL;ZESTRIL) 10 MG tablet Take 1 tablet by mouth daily 3/2/19   Demario Knott MD   aspirin 81 MG EC tablet Take 1 tablet by mouth daily 3/1/19   Demario Knott MD   atorvastatin (LIPITOR) 40 MG tablet Take 1 tablet by mouth nightly 2/28/19 Larry Balderas MD   metoprolol tartrate (LOPRESSOR) 25 MG tablet Take 1 tablet by mouth 2 times daily 2/28/19   Larry Balderas MD   amLODIPine (NORVASC) 10 MG tablet Take 1 tablet by mouth daily 3/1/19   Larry Balderas MD   clopidogrel (PLAVIX) 75 MG tablet Take 1 tablet by mouth daily 3/1/19   Larry Balderas MD        Allergies:  Patient has no known allergies. Social History:  She is , lives with her . She has never smoked and denies alcohol use. Family History:      Problem Relation Age of Onset    Diabetes Brother        Review of Systems:  Review of Systems   Pertinent positive and negative items are noted in the HPI. A comprehensive review of all other symptoms is otherwise negative. Physical Examination:    Vitals:    04/25/19 1107   Temp: 97.6 °F (36.4 °C)   Weight: 154 lb (69.9 kg)   Height: 5' 7.5\" (1.715 m)     Body mass index is 23.76 kg/m². Physical Exam   Constitutional: She is oriented to person, place, and time. She appears well-developed and well-nourished. No distress. HENT:   Head: Normocephalic and atraumatic. Eyes: Pupils are equal, round, and reactive to light. Conjunctivae and EOM are normal. No scleral icterus. Neck: Normal range of motion. Neck supple. Normal carotid pulses (faint left) and no JVD present. Carotid bruit is present (faint left). Cardiovascular: Normal rate, regular rhythm and normal heart sounds. Exam reveals no gallop and no friction rub. No murmur heard. Pulmonary/Chest: Effort normal and breath sounds normal. No stridor. No respiratory distress. She has no wheezes. She has no rales. Abdominal: Soft. Bowel sounds are normal. She exhibits no distension and no mass. There is no tenderness. There is no guarding. Musculoskeletal: Normal range of motion. Neurological: She is alert and oriented to person, place, and time. No cranial nerve deficit.  Gait (Slight shuffling gait but walks independently) abnormal.   Right leg and arm with 5 out of 5 motor and sensory. Left arm and left leg with normal sensory and 4 out of 5 motor. Skin: Skin is warm and dry. Capillary refill takes less than 2 seconds. Psychiatric: She has a normal mood and affect. Her behavior is normal. Judgment and thought content normal.           MEDICAL DECISION MAKING/TESTING  CTA NECK W CONTRAST  2/26/19  Impression       Severe/ critical (greater than 90% diameter) stenosis at the origin of the left ICA, associated with prominent fibrofatty plaque and possible plaque ulcer.       Minimal calcific plaque at the right ICA origin with minimal (less than 20% diameter) stenosis     MRI BRAIN WO CONTRAST  2/26/19  Impression       Diffusion restriction within the right thalamus consistent with acute or early subacute ischemic infarct     NM MYOCARDIAL SPECT REST EXERCISE OR RX 3/22/19  Summary    Overall findings represent a low risk scan.    Normal LV size and systolic function.    No evidence of reversible ischemia. Very small apical area of decreased    perfusion during stress likely artifact. Diagnosis:     1. Recent right thalamic stroke, unrelated to carotid disease  2. Severe left carotid stenosis, asymptomatic        Plan:  She is appropriately on aspirin and statin but was recently removed from Plavix. I would recommend dual antiplatelet therapy and asked her to resume the Plavix as long she has no complications. Regarding the severe left carotid stenosis, it was unrelated to her recent stroke from which she is recovering. I did offer her the option of left carotid endarterectomy to decrease her future risk of stroke. 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 She is on maximal medical therapy. I think this is reasonable given that she is asymptomatic.   I will see her back in 3 months

## 2019-07-12 ENCOUNTER — TELEPHONE (OUTPATIENT)
Dept: VASCULAR SURGERY | Age: 84
End: 2019-07-12

## 2019-08-08 ENCOUNTER — HOSPITAL ENCOUNTER (OUTPATIENT)
Dept: VASCULAR LAB | Age: 84
Discharge: HOME | End: 2019-08-08
Payer: MEDICARE

## 2019-08-08 DIAGNOSIS — I65.23 CAROTID STENOSIS, ASYMPTOMATIC, BILATERAL: ICD-10-CM

## 2019-08-08 PROCEDURE — 93880 EXTRACRANIAL BILAT STUDY: CPT

## 2019-08-09 ENCOUNTER — TELEPHONE (OUTPATIENT)
Dept: VASCULAR SURGERY | Age: 84
End: 2019-08-09

## 2019-08-15 ENCOUNTER — OFFICE VISIT (OUTPATIENT)
Dept: VASCULAR SURGERY | Age: 84
End: 2019-08-15
Payer: MEDICARE

## 2019-08-15 VITALS
SYSTOLIC BLOOD PRESSURE: 158 MMHG | HEIGHT: 65 IN | TEMPERATURE: 97.3 F | WEIGHT: 150.4 LBS | HEART RATE: 57 BPM | DIASTOLIC BLOOD PRESSURE: 65 MMHG | BODY MASS INDEX: 25.06 KG/M2

## 2019-08-15 DIAGNOSIS — I65.23 CAROTID STENOSIS, ASYMPTOMATIC, BILATERAL: Primary | ICD-10-CM

## 2019-08-15 PROCEDURE — 1090F PRES/ABSN URINE INCON ASSESS: CPT | Performed by: SURGERY

## 2019-08-15 PROCEDURE — 1036F TOBACCO NON-USER: CPT | Performed by: SURGERY

## 2019-08-15 PROCEDURE — 1123F ACP DISCUSS/DSCN MKR DOCD: CPT | Performed by: SURGERY

## 2019-08-15 PROCEDURE — 99214 OFFICE O/P EST MOD 30 MIN: CPT | Performed by: SURGERY

## 2019-08-15 PROCEDURE — G8419 CALC BMI OUT NRM PARAM NOF/U: HCPCS | Performed by: SURGERY

## 2019-08-15 PROCEDURE — G8427 DOCREV CUR MEDS BY ELIG CLIN: HCPCS | Performed by: SURGERY

## 2019-08-15 PROCEDURE — G8598 ASA/ANTIPLAT THER USED: HCPCS | Performed by: SURGERY

## 2019-08-15 PROCEDURE — 4040F PNEUMOC VAC/ADMIN/RCVD: CPT | Performed by: SURGERY

## 2019-08-15 NOTE — PROGRESS NOTES
[facility]) Vascular Surgery--Robbi Bone, [address] [facility], [address]    Follow-up    Referring Provider:  Yanet Vance MD     Patient Name: Otis Ramirez  YOB: 1932  Date: 08/15/19    History:  Otis Ramirez is a 80 y.o. female being followed for severe but asymptomatic left carotid stenosis. She was admitted to [facility]note 3] in February, at which time she was found to have right thalamic stroke. She was incidentally found to have severe left ICA stenosis, though this was not related to her stroke. Blood pressure was also uncontrolled at that time. Since my last visit with her in April, she is at home living with both sons. She still feels generally unsteady, though she is happy with her degree of independence. She has had no new stroke symptoms, specifically no unilateral weakness, numbness, tingling, or paralysis of the extremities, facial droop, slurred speech, difficulty speaking or swallowing. She had a repeat VL DUP CAROTID BILATERAL 8/8/19, images which I personally reviewed, notable soup for severe left ICA stenosis. Mild right ICA stenosis. Vital Signs  BP (!) 158/65   Pulse 57   Temp 97.3 °F (36.3 °C) (Oral)   Ht 5' 5\" (1.651 m)   Wt 150 lb 6.4 oz (68.2 kg)   BMI 25.03 kg/m²     Physical Examination:  General:  alert and oriented to person, place and time, well-developed and well-nourished, in no acute distress, frail-appearing. HEENT: Neck is supple with no JVD. Carotids demonstrate a loud bruit on the left. No right-sided carotid bruit. Heart: RRR no murmur  Lungs:  CTA B no w/r/r  Abdomen: soft, NT/ND. Neuro:  Demonstrates some discoordination with her gait and requires some assistance to steady her. Left upper extremity with 4 out of 5 muscle strength, decreased compared to right upper extremity. Skin/integumentary: Skin color, texture, turgor normal. No rashes or lesions.     Impression:  Asymptomatic, severe left carotid

## 2019-08-26 RX ORDER — CLOPIDOGREL BISULFATE 75 MG/1
75 TABLET ORAL DAILY
Qty: 30 TABLET | Refills: 0 | Status: ON HOLD | OUTPATIENT
Start: 2019-08-26 | End: 2021-08-16 | Stop reason: HOSPADM

## 2019-12-16 ENCOUNTER — APPOINTMENT (OUTPATIENT)
Dept: GENERAL RADIOLOGY | Age: 84
End: 2019-12-16
Payer: MEDICARE

## 2019-12-16 ENCOUNTER — HOSPITAL ENCOUNTER (EMERGENCY)
Age: 84
Discharge: HOME | End: 2019-12-16
Attending: EMERGENCY MEDICINE
Payer: MEDICARE

## 2019-12-16 ENCOUNTER — APPOINTMENT (OUTPATIENT)
Dept: CT IMAGING | Age: 84
End: 2019-12-16
Payer: MEDICARE

## 2019-12-16 VITALS
BODY MASS INDEX: 25.66 KG/M2 | SYSTOLIC BLOOD PRESSURE: 162 MMHG | WEIGHT: 154 LBS | RESPIRATION RATE: 18 BRPM | TEMPERATURE: 98.3 F | OXYGEN SATURATION: 98 % | DIASTOLIC BLOOD PRESSURE: 57 MMHG | HEART RATE: 72 BPM | HEIGHT: 65 IN

## 2019-12-16 DIAGNOSIS — M16.0 PRIMARY OSTEOARTHRITIS OF BOTH HIPS: Primary | ICD-10-CM

## 2019-12-16 PROCEDURE — 73502 X-RAY EXAM HIP UNI 2-3 VIEWS: CPT

## 2019-12-16 PROCEDURE — 72192 CT PELVIS W/O DYE: CPT

## 2019-12-16 PROCEDURE — 99284 EMERGENCY DEPT VISIT MOD MDM: CPT

## 2019-12-16 ASSESSMENT — PAIN DESCRIPTION - LOCATION: LOCATION: HIP

## 2019-12-16 ASSESSMENT — PAIN DESCRIPTION - PAIN TYPE: TYPE: ACUTE PAIN

## 2019-12-16 ASSESSMENT — PAIN SCALES - GENERAL: PAINLEVEL_OUTOF10: 10

## 2019-12-16 ASSESSMENT — PAIN DESCRIPTION - FREQUENCY: FREQUENCY: INTERMITTENT

## 2019-12-16 ASSESSMENT — PAIN DESCRIPTION - ORIENTATION: ORIENTATION: LEFT

## 2020-05-28 ENCOUNTER — TELEPHONE (OUTPATIENT)
Dept: VASCULAR SURGERY | Age: 85
End: 2020-05-28

## 2021-08-03 ENCOUNTER — APPOINTMENT (OUTPATIENT)
Dept: CT IMAGING | Age: 86
DRG: 418 | End: 2021-08-03
Payer: MEDICARE

## 2021-08-03 ENCOUNTER — HOSPITAL ENCOUNTER (EMERGENCY)
Age: 86
Discharge: HOME | DRG: 418 | End: 2021-08-03
Attending: EMERGENCY MEDICINE
Payer: MEDICARE

## 2021-08-03 VITALS
DIASTOLIC BLOOD PRESSURE: 68 MMHG | OXYGEN SATURATION: 96 % | TEMPERATURE: 98.6 F | SYSTOLIC BLOOD PRESSURE: 151 MMHG | HEART RATE: 76 BPM | HEIGHT: 67 IN | RESPIRATION RATE: 17 BRPM | WEIGHT: 150 LBS | BODY MASS INDEX: 23.54 KG/M2

## 2021-08-03 DIAGNOSIS — K80.12 CALCULUS OF GALLBLADDER WITH ACUTE ON CHRONIC CHOLECYSTITIS WITHOUT OBSTRUCTION: Primary | ICD-10-CM

## 2021-08-03 LAB
A/G RATIO: 0.9 (ref 1.1–2.2)
ALBUMIN SERPL-MCNC: 3.7 G/DL (ref 3.4–5)
ALP BLD-CCNC: 176 U/L (ref 40–129)
ALT SERPL-CCNC: 30 U/L (ref 10–40)
ANION GAP SERPL CALCULATED.3IONS-SCNC: 11 MMOL/L (ref 3–16)
AST SERPL-CCNC: 34 U/L (ref 15–37)
BACTERIA: ABNORMAL /HPF
BASE EXCESS VENOUS: -0.1 MMOL/L (ref -2–3)
BASOPHILS ABSOLUTE: 0 K/UL (ref 0–0.2)
BASOPHILS RELATIVE PERCENT: 0.3 %
BILIRUB SERPL-MCNC: 0.8 MG/DL (ref 0–1)
BILIRUBIN URINE: NEGATIVE
BLOOD, URINE: ABNORMAL
BUN BLDV-MCNC: 14 MG/DL (ref 7–20)
CALCIUM SERPL-MCNC: 9 MG/DL (ref 8.3–10.6)
CARBOXYHEMOGLOBIN: 1.3 % (ref 0–1.5)
CHLORIDE BLD-SCNC: 101 MMOL/L (ref 99–110)
CLARITY: CLEAR
CO2: 22 MMOL/L (ref 21–32)
COLOR: YELLOW
CREAT SERPL-MCNC: 1 MG/DL (ref 0.6–1.2)
EOSINOPHILS ABSOLUTE: 0 K/UL (ref 0–0.6)
EOSINOPHILS RELATIVE PERCENT: 0.4 %
EPITHELIAL CELLS, UA: ABNORMAL /HPF (ref 0–5)
GFR AFRICAN AMERICAN: >60
GFR NON-AFRICAN AMERICAN: 52
GLOBULIN: 4 G/DL
GLUCOSE BLD-MCNC: 176 MG/DL (ref 70–99)
GLUCOSE URINE: NEGATIVE MG/DL
HCO3 VENOUS: 24.1 MMOL/L (ref 24–28)
HCT VFR BLD CALC: 39.4 % (ref 36–48)
HEMOGLOBIN, VEN, REDUCED: 8.2 %
HEMOGLOBIN: 13.8 G/DL (ref 12–16)
KETONES, URINE: NEGATIVE MG/DL
LACTIC ACID: 1.6 MMOL/L (ref 0.4–2)
LEUKOCYTE ESTERASE, URINE: ABNORMAL
LIPASE: 16 U/L (ref 13–60)
LYMPHOCYTES ABSOLUTE: 0.6 K/UL (ref 1–5.1)
LYMPHOCYTES RELATIVE PERCENT: 6.8 %
MCH RBC QN AUTO: 32.3 PG (ref 26–34)
MCHC RBC AUTO-ENTMCNC: 35 G/DL (ref 31–36)
MCV RBC AUTO: 92.1 FL (ref 80–100)
METHEMOGLOBIN VENOUS: 0.2 % (ref 0–1.5)
MICROSCOPIC EXAMINATION: YES
MONOCYTES ABSOLUTE: 2 K/UL (ref 0–1.3)
MONOCYTES RELATIVE PERCENT: 21 %
NEUTROPHILS ABSOLUTE: 6.7 K/UL (ref 1.7–7.7)
NEUTROPHILS RELATIVE PERCENT: 71.5 %
NITRITE, URINE: NEGATIVE
O2 SAT, VEN: 92 %
OVALOCYTES: ABNORMAL
PCO2, VEN: 37.3 MMHG (ref 41–51)
PDW BLD-RTO: 14.8 % (ref 12.4–15.4)
PH UA: 6 (ref 5–8)
PH VENOUS: 7.42 (ref 7.35–7.45)
PLATELET # BLD: 446 K/UL (ref 135–450)
PMV BLD AUTO: 8.2 FL (ref 5–10.5)
PO2, VEN: 58.8 MMHG (ref 25–40)
POTASSIUM REFLEX MAGNESIUM: 3.9 MMOL/L (ref 3.5–5.1)
PROTEIN UA: NEGATIVE MG/DL
RBC # BLD: 4.27 M/UL (ref 4–5.2)
RBC UA: ABNORMAL /HPF (ref 0–4)
RENAL EPITHELIAL, UA: ABNORMAL /HPF (ref 0–1)
SCHISTOCYTES: ABNORMAL
SODIUM BLD-SCNC: 134 MMOL/L (ref 136–145)
SPECIFIC GRAVITY UA: 1.02 (ref 1–1.03)
TCO2 CALC VENOUS: 25 MMOL/L
TEAR DROP CELLS: ABNORMAL
TOTAL PROTEIN: 7.7 G/DL (ref 6.4–8.2)
TROPONIN: <0.01 NG/ML
URINE TYPE: ABNORMAL
UROBILINOGEN, URINE: 1 E.U./DL
WBC # BLD: 9.4 K/UL (ref 4–11)
WBC UA: ABNORMAL /HPF (ref 0–5)

## 2021-08-03 PROCEDURE — 36415 COLL VENOUS BLD VENIPUNCTURE: CPT

## 2021-08-03 PROCEDURE — 84484 ASSAY OF TROPONIN QUANT: CPT

## 2021-08-03 PROCEDURE — 81001 URINALYSIS AUTO W/SCOPE: CPT

## 2021-08-03 PROCEDURE — 93005 ELECTROCARDIOGRAM TRACING: CPT

## 2021-08-03 PROCEDURE — 99285 EMERGENCY DEPT VISIT HI MDM: CPT

## 2021-08-03 PROCEDURE — 6360000004 HC RX CONTRAST MEDICATION: Performed by: EMERGENCY MEDICINE

## 2021-08-03 PROCEDURE — 2580000003 HC RX 258: Performed by: PHYSICIAN ASSISTANT

## 2021-08-03 PROCEDURE — 85025 COMPLETE CBC W/AUTO DIFF WBC: CPT

## 2021-08-03 PROCEDURE — 82803 BLOOD GASES ANY COMBINATION: CPT

## 2021-08-03 PROCEDURE — 83690 ASSAY OF LIPASE: CPT

## 2021-08-03 PROCEDURE — 80053 COMPREHEN METABOLIC PANEL: CPT

## 2021-08-03 PROCEDURE — 83605 ASSAY OF LACTIC ACID: CPT

## 2021-08-03 PROCEDURE — 74174 CTA ABD&PLVS W/CONTRAST: CPT

## 2021-08-03 RX ORDER — SODIUM CHLORIDE, SODIUM LACTATE, POTASSIUM CHLORIDE, CALCIUM CHLORIDE 600; 310; 30; 20 MG/100ML; MG/100ML; MG/100ML; MG/100ML
1000 INJECTION, SOLUTION INTRAVENOUS ONCE
Status: COMPLETED | OUTPATIENT
Start: 2021-08-03 | End: 2021-08-03

## 2021-08-03 RX ADMIN — SODIUM CHLORIDE, POTASSIUM CHLORIDE, SODIUM LACTATE AND CALCIUM CHLORIDE 1000 ML: 600; 310; 30; 20 INJECTION, SOLUTION INTRAVENOUS at 10:34

## 2021-08-03 RX ADMIN — IOPAMIDOL 80 ML: 755 INJECTION, SOLUTION INTRAVENOUS at 12:03

## 2021-08-03 ASSESSMENT — ENCOUNTER SYMPTOMS
COUGH: 0
NAUSEA: 1
SHORTNESS OF BREATH: 0
WHEEZING: 0
CHEST TIGHTNESS: 0
DIARRHEA: 0
VOMITING: 0
ABDOMINAL PAIN: 1

## 2021-08-03 ASSESSMENT — PAIN DESCRIPTION - PAIN TYPE: TYPE: ACUTE PAIN

## 2021-08-03 ASSESSMENT — PAIN SCALES - GENERAL: PAINLEVEL_OUTOF10: 5

## 2021-08-03 ASSESSMENT — PAIN DESCRIPTION - LOCATION: LOCATION: CHEST

## 2021-08-03 NOTE — ED TRIAGE NOTES
Lower sternal/epigastric pain for past 5 days. States she didn't sleep last night and her son made her come to ED today.

## 2021-08-03 NOTE — CONSULTS
tablet Take 1 tablet by mouth daily 30 tablet 0       Current Meds  No current facility-administered medications for this encounter. Family History:   Family History   Problem Relation Age of Onset    Diabetes Brother        Social History:   TOBACCO:   reports that she has never smoked. She has never used smokeless tobacco.  ETOH:   reports no history of alcohol use. DRUGS:   reports no history of drug use. ROS: A 14 point review of systems was conducted, significant findings as noted in HPI. All other systems negative. Physical exam:    Vitals:    08/03/21 1330 08/03/21 1345 08/03/21 1400 08/03/21 1415   BP: (!) 151/76  (!) 151/68    Pulse: 79 80 77 76   Resp: 16 16 18 17   Temp:       TempSrc:       SpO2:       Weight:       Height:           General appearance: alert, no acute distress, elderly  Eyes: Grossly normal, no scleral icterus  Neck: trachea midline, no JVD  Chest/Lungs: Normal effort, symmetric chest rise, on room air  Cardiovascular: RRR  Abdomen: soft, non-tender, non-distended, negative Davis sign  Skin: warm and dry, no rashes  Extremities: no edema, no cyanosis  Neuro: A&Ox3, no focal deficits, sensation intact    Labs:    CBC:   Recent Labs     08/03/21  0949   WBC 9.4   HGB 13.8   HCT 39.4   MCV 92.1        BMP:   Recent Labs     08/03/21  0949   *   K 3.9      CO2 22   BUN 14   CREATININE 1.0     PT/INR: No results for input(s): PROTIME, INR in the last 72 hours. APTT: No results for input(s): APTT in the last 72 hours.   Liver Profile:  Lab Results   Component Value Date    AST 34 08/03/2021    ALT 30 08/03/2021    BILIDIR <0.2 10/13/2017    BILITOT 0.8 08/03/2021    ALKPHOS 176 08/03/2021     Lab Results   Component Value Date    CHOL 185 02/28/2019    HDL 54 02/28/2019    TRIG 116 02/28/2019     UA:   Lab Results   Component Value Date    COLORU Yellow 08/03/2021    PHUR 6.0 08/03/2021    WBCUA 3-5 08/03/2021    RBCUA 0-2 08/03/2021    BACTERIA 1+ 08/03/2021 CLARITYU Clear 08/03/2021    SPECGRAV 1.020 08/03/2021    LEUKOCYTESUR TRACE 08/03/2021    UROBILINOGEN 1.0 08/03/2021    BILIRUBINUR Negative 08/03/2021    BLOODU TRACE-INTACT 08/03/2021    GLUCOSEU Negative 08/03/2021    AMORPHOUS Rare 02/26/2019       Imaging:   CTA CHEST ABDOMEN PELVIS W CONTRAST   Final Result   1. No evidence of thoracic aortic aneurysm or dissection. Coronary artery calcifications. 2. Bibasilar subsegmental atelectasis. No pulmonary nodule or mass detected. CT angiography abdomen and pelvis findings:      Hepatobiliary: No liver lesion detected. No biliary ductal dilation. Gallbladder is distended and contains numerous, partially calcified gallstones. Mild gallbladder wall thickening suggested. Appearance may represent acute or chronic cholecystitis. Pancreas: No acute abnormality. Spleen: Normal.      Adrenal glands: Normal.      GENITOURINARY: Normal, symmetric appearance of bilateral kidneys. No hydronephrosis. No definite abnormality of ureters or urinary bladder. Reproductive organs: No definite abnormality. GASTROINTESTINAL: Colonic diverticulosis is demonstrated. This involves mainly the sigmoid portion of the colon. There is no CT evidence of acute diverticulitis. Normal appendix. Normal bowel caliber. Lymph nodes: No enlarged lymph nodes are detected in the inguinal or pelvic sidewall regions. No pathologic mesenteric or retroperitoneal lymphadenopathy. Peritoneum/retroperitoneum: Trace amount of free pelvic fluid is noted. Skeletal: No lytic or blastic skeletal lesions are detected. Vascular: No evidence of abdominal aortic dissection. Infrarenal abdominal aortic aneurysm measures 3.5 cm transverse by 3.0 cm AP. Distal abdominal aorta measures 1.9 cm x 1.9 cm. There is some calcific plaque identified at the origins of the celiac and    superior mesenteric arteries with no significant stenosis.  Similarly, some calcific plaque demonstrated at the origin of the inferior mesenteric artery without high-grade stenosis. Single renal arteries are identified bilaterally. There is a small    amount of calcific plaque at the origin of the left main renal artery. Noncalcific plaque seen at the origin of the right main renal artery. There is estimated 50% diameter stenosis of the bilateral renal arteries at their origins. Bilateral common and    internal iliac arteries are normal in caliber. IMPRESSION:   1. No evidence of abdominal aortic dissection. 2. Infrarenal abdominal aortic aneurysm measuring up to 3.5 cm x 3.0 cm.   3. Calcific and fibrous plaque at the origins of bilateral renal arteries producing estimated 50% diameter stenosis of the bilateral renal arteries, borderline flow significant. 4. Distended gallbladder containing multiple partially calcified gallstones. Findings may represent cholecystitis, acute or chronic. 5. Extensive colonic diverticulosis without diverticulitis. Trace free pelvic fluid. NM HEPATOBILIARY    (Results Pending)   NM HEPATOBILIARY    (Results Pending)          Assessment/Plan: This is a 80 y.o. female with history of CVA and hypertension who presented to the emergency department with complaints of 5 days of epigastric abdominal pain. Patient underwent CTA of the chest abdomen pelvis which showed a large gallstone with some distention of the gallbladder. Lab work within normal limits except for a slight elevation in her alkaline phosphatase at 176. Long discussion had with the patient and the patient's family regarding neck steps. Discussed that the differential diagnosis at this time is symptomatic cholelithiasis versus acute cholecystitis. We discussed obtaining a HIDA scan to determine whether or not the patient needed surgical intervention this admission or if the patient could wait to see if her symptoms returned.   Originally the patient's family wanted to proceed with a HIDA scan however nuclear medicine was not available on admission. After further discussion with the family and the patient the patient opted to be discharged from the emergency department with close follow-up with Dr. Anahi Levy in the office. She will plan to return for follow-up in the next week with discussion for further imaging versus monitoring of symptoms. The patient is a candidate for laparoscopic cholecystectomy however risks benefits need to be discussed with the patient and patient's family do to her age and frailty. Patient and plan discussed with Dr. Anahi Levy who is in agreement. Patient and plan discussed with the ED provider.     Roderick Pablo DO  PGY-5 General Surgery  08/03/21  10:18 PM  [phone]

## 2021-08-03 NOTE — ED NOTES
Patient prepared for and ready to be discharged. Patient discharged at this time in no acute distress after verbalizing understanding of discharge instructions. Patient left after receiving After Visit Summary instructions.       Geo Reno RN  08/03/21 2750

## 2021-08-03 NOTE — ED NOTES
Bed: A03-03  Expected date:   Expected time:   Means of arrival:   Comments:  Barney Keene RN  08/03/21 2015

## 2021-08-03 NOTE — ED NOTES
Surgical resident at bedside, nm unable to perform test today, updated jorge raya on pt requesting to go home.       Adriel Lisa RN  08/03/21 9719

## 2021-08-03 NOTE — ED NOTES
Attempted to schedule nm scan, no answer x2. notifed pt and family to call and schedule. Pt left with son, no questions.       Ewell Olszewski, RN  08/03/21 0562

## 2021-08-03 NOTE — ED PROVIDER NOTES
[facility]          PHYSICIAN ASSISTANT NOTE       Date of evaluation: 8/3/2021    Chief Complaint     Chest Pain (Lower sternal/epigastric pain for past 5 days. States she didn't sleep last night and her son made her come to ED today.)    History of Present Illness     Louis Rodríguez is a 80 y.o. female who presents with chest/abdominal pain. Patient reports that she has had pain in the epigastric region for approximately the past 5 days. States that the pain is located in the epigastrium and feels like she needs to \"pull something down\". She is also experiencing some pain radiating up into her chest and into her lower abdomen. States that she has not ate or drank anything for the past 3 days due to the ongoing pain and generally feeling unwell. Denies any known fevers or chills. Make several comments about \"I think it might be my time\" and that she was surprised that she made it through the night due to feeling so bad. Her sons requested that she present to the emergency department today for evaluation. Review of Systems     Review of Systems   Constitutional: Positive for appetite change and fatigue. Negative for chills, diaphoresis and fever. Respiratory: Negative for cough, chest tightness, shortness of breath and wheezing. Cardiovascular: Positive for chest pain (lower mid-sternal). Negative for palpitations. Gastrointestinal: Positive for abdominal pain (epigastric) and nausea. Negative for diarrhea and vomiting. Genitourinary: Negative. Musculoskeletal: Negative. Skin: Negative for rash. Neurological: Negative for dizziness, weakness, light-headedness and headaches. All other systems reviewed and are negative. Past Medical, Surgical, Family, and Social History     She has a past medical history of CVA (cerebral vascular accident) ([address] and Hypertension. She has no past surgical history on file.   Her family history includes Diabetes in her brother. She reports that she has never smoked. She has never used smokeless tobacco. She reports that she does not drink alcohol and does not use drugs. Medications     Previous Medications    AMLODIPINE (NORVASC) 10 MG TABLET    Take 1 tablet by mouth daily    ASPIRIN 81 MG EC TABLET    Take 1 tablet by mouth daily    ATORVASTATIN (LIPITOR) 40 MG TABLET    Take 1 tablet by mouth nightly    CLOPIDOGREL (PLAVIX) 75 MG TABLET    Take 1 tablet by mouth daily    CLOPIDOGREL (PLAVIX) 75 MG TABLET    TAKE 1 TABLET BY MOUTH DAILY    LISINOPRIL (PRINIVIL;ZESTRIL) 10 MG TABLET    Take 1 tablet by mouth daily    METOPROLOL TARTRATE (LOPRESSOR) 25 MG TABLET    Take 1 tablet by mouth 2 times daily       Allergies     She has No Known Allergies. Physical Exam     INITIAL VITALS: BP: 130/69, Temp: 98.6 °F (37 °C), Pulse: 74, Resp: 18, SpO2: 96 %  Physical Exam  Vitals and nursing note reviewed. Constitutional:       General: She is not in acute distress. Appearance: She is well-developed. She is not diaphoretic. HENT:      Head: Normocephalic and atraumatic. Eyes:      Conjunctiva/sclera: Conjunctivae normal.      Pupils: Pupils are equal, round, and reactive to light. Cardiovascular:      Rate and Rhythm: Normal rate and regular rhythm. Heart sounds: Normal heart sounds. No murmur heard. No friction rub. Pulmonary:      Effort: Pulmonary effort is normal. No respiratory distress. Breath sounds: Normal breath sounds. No wheezing or rales. Abdominal:      General: Abdomen is flat. There is no distension. Palpations: Abdomen is soft. Tenderness: There is no abdominal tenderness. There is no guarding or rebound. Musculoskeletal:         General: Normal range of motion. Cervical back: Normal range of motion. Skin:     General: Skin is warm and dry. Neurological:      Mental Status: She is alert and oriented to person, place, and time.    Psychiatric:         Behavior: 1. 9 cm. There is some calcific plaque identified at the origins of the celiac and    superior mesenteric arteries with no significant stenosis. Similarly, some calcific plaque demonstrated at the origin of the inferior mesenteric artery without high-grade stenosis. Single renal arteries are identified bilaterally. There is a small    amount of calcific plaque at the origin of the left main renal artery. Noncalcific plaque seen at the origin of the right main renal artery. There is estimated 50% diameter stenosis of the bilateral renal arteries at their origins. Bilateral common and    internal iliac arteries are normal in caliber. IMPRESSION:   1. No evidence of abdominal aortic dissection. 2. Infrarenal abdominal aortic aneurysm measuring up to 3.5 cm x 3.0 cm.   3. Calcific and fibrous plaque at the origins of bilateral renal arteries producing estimated 50% diameter stenosis of the bilateral renal arteries, borderline flow significant. 4. Distended gallbladder containing multiple partially calcified gallstones. Findings may represent cholecystitis, acute or chronic. 5. Extensive colonic diverticulosis without diverticulitis. Trace free pelvic fluid.       NM HEPATOBILIARY    (Results Pending)   NM HEPATOBILIARY    (Results Pending)       LABS:   Results for orders placed or performed during the hospital encounter of 08/03/21   CBC Auto Differential   Result Value Ref Range    WBC 9.4 4.0 - 11.0 K/uL    RBC 4.27 4.00 - 5.20 M/uL    Hemoglobin 13.8 12.0 - 16.0 g/dL    Hematocrit 39.4 36.0 - 48.0 %    MCV 92.1 80.0 - 100.0 fL    MCH 32.3 26.0 - 34.0 pg    MCHC 35.0 31.0 - 36.0 g/dL    RDW 14.8 12.4 - 15.4 %    Platelets 662 441 - 194 K/uL    MPV 8.2 5.0 - 10.5 fL    Neutrophils % 71.5 %    Lymphocytes % 6.8 %    Monocytes % 21.0 %    Eosinophils % 0.4 %    Basophils % 0.3 %    Neutrophils Absolute 6.7 1.7 - 7.7 K/uL    Lymphocytes Absolute 0.6 (L) 1.0 - 5.1 K/uL    Monocytes Absolute 2.0 (H) 0.0 - 1.3 K/uL Eosinophils Absolute 0.0 0.0 - 0.6 K/uL    Basophils Absolute 0.0 0.0 - 0.2 K/uL    Schistocytes Occasional (A)     Ovalocytes 2+ (A)     Tear Drop Cells Occasional (A)    Comprehensive Metabolic Panel w/ Reflex to MG   Result Value Ref Range    Sodium 134 (L) 136 - 145 mmol/L    Potassium reflex Magnesium 3.9 3.5 - 5.1 mmol/L    Chloride 101 99 - 110 mmol/L    CO2 22 21 - 32 mmol/L    Anion Gap 11 3 - 16    Glucose 176 (H) 70 - 99 mg/dL    BUN 14 7 - 20 mg/dL    CREATININE 1.0 0.6 - 1.2 mg/dL    GFR Non-African American 52 (A) >60    GFR African American >60 >60    Calcium 9.0 8.3 - 10.6 mg/dL    Total Protein 7.7 6.4 - 8.2 g/dL    Albumin 3.7 3.4 - 5.0 g/dL    Albumin/Globulin Ratio 0.9 (L) 1.1 - 2.2    Total Bilirubin 0.8 0.0 - 1.0 mg/dL    Alkaline Phosphatase 176 (H) 40 - 129 U/L    ALT 30 10 - 40 U/L    AST 34 15 - 37 U/L    Globulin 4.0 g/dL   Lipase   Result Value Ref Range    Lipase 16.0 13.0 - 60.0 U/L   Troponin   Result Value Ref Range    Troponin <0.01 <0.01 ng/mL   Urinalysis, reflex to microscopic   Result Value Ref Range    Color, UA Yellow Straw/Yellow    Clarity, UA Clear Clear    Glucose, Ur Negative Negative mg/dL    Bilirubin Urine Negative Negative    Ketones, Urine Negative Negative mg/dL    Specific Gravity, UA 1.020 1.005 - 1.030    Blood, Urine TRACE-INTACT (A) Negative    pH, UA 6.0 5.0 - 8.0    Protein, UA Negative Negative mg/dL    Urobilinogen, Urine 1.0 <2.0 E.U./dL    Nitrite, Urine Negative Negative    Leukocyte Esterase, Urine TRACE (A) Negative    Microscopic Examination YES     Urine Type NotGiven    Blood Gas, Venous   Result Value Ref Range    pH, Kyle 7.419 7.350 - 7.450    pCO2, Kyle 37.3 (L) 41.0 - 51.0 mmHg    pO2, Kyle 58.8 (H) 25 - 40 mmHg    HCO3, Venous 24.1 24.0 - 28.0 mmol/L    Base Excess, Kyle -0.1 -2.0 - 3.0 mmol/L    O2 Sat, Kyle 92 Not established %    Carboxyhemoglobin 1.3 0.0 - 1.5 %    MetHgb, Kyle 0.2 0.0 - 1.5 %    TC02 (Calc), Kyle 25 mmol/L    Hemoglobin, Kyle, Reduced 8.20 %   Lactic Acid, Plasma   Result Value Ref Range    Lactic Acid 1.6 0.4 - 2.0 mmol/L   Microscopic Urinalysis   Result Value Ref Range    WBC, UA 3-5 0 - 5 /HPF    RBC, UA 0-2 0 - 4 /HPF    Epithelial Cells, UA 0-1 0 - 5 /HPF    Renal Epithelial, UA 2-5 (A) 0 - 1 /HPF    Bacteria, UA 1+ (A) None Seen /HPF       ED BEDSIDE ULTRASOUND:  none    RECENT VITALS:  BP: (!) 151/68, Temp: 98.6 °F (37 °C), Pulse: 76, Resp: 17, SpO2: 96 %     Procedures     none    ED Course     Nursing Notes, Past Medical Hx,Past Surgical Hx, Social Hx, Allergies, and Family Hx were reviewed. The patient was given the following medications:  Orders Placed This Encounter   Medications    lactated ringers infusion 1,000 mL    iopamidol (ISOVUE-370) 76 % injection 80 mL       CONSULTS:  C/ Tien [address] / ASSESSMENT / Conception Sindy is a 80 y.o. female presenting with midepigastric pain with potential radiation into the chest and lower abdomen. On evaluation, patient is hemodynamically stable, afebrile. She is overall very well-appearing and in no acute distress at this time. Patient does make comments of how she believed \"it was her time\". EKG was obtained upon arrival which is overall reassuring without acute ischemic changes, although does reveal multiple PVCs present. IV access established basic laboratory analysis was initiated. Order for CT angiography of the aorta as well as the chest abdomen and pelvis was initiated for evaluation of potential dissection due to patient's concerns of impending doom. Her laboratory studies are overall reassuring including no leukocytosis, no evidence of acute anemia. Her troponin is negative. Her alk phos is mildly elevated, nonspecific. BMP with mild hyponatremia of 134, remainder of labs is reassuring.   Cross-sectional imaging reassuringly did not show any evidence of aortic dissection, although did show a small aortic aneurysm noted.  There is fibrous plaque noted to the bilateral renal arteries which were not contributory today. Patient was noted to have a distended gallbladder with multiple large gallstones and gallbladder wall thickening, acute versus chronic cholecystitis which is likely the cause of patient's pain and presentation today. Due to this finding, general surgery was consulted and evaluated patient in the emergency department. They recommended obtaining a HIDA scan for further delineation of patient's likely symptomatic cholelithiasis. Unfortunately, this was unable to be obtained today due to unavailability of contrast for the skin. General surgery to admit the patient for further work-up, although patient does not wish to stay to have this obtained as she is currently not experiencing any pain at all. Discussion was had between the patient and the general surgery team and she will obtain the scan as an outpatient and follow-up with Dr. Angel Mullins. An order was placed for outpatient scan. Very strict return precautions including any fever development, return of pain, worsening of pain, inability eat or drink, or any other concerns were thoroughly discussed between patient and her son. They are in agreement with plan. Discharged in stable condition    This patient was also evaluated by the attending physician. All care plans were discussed and agreed upon. Clinical Impression     1.  Calculus of gallbladder with acute on chronic cholecystitis without obstruction        Disposition     PATIENT REFERRED TO:  Sindhu Wilson, [address]  [phone]    Schedule an appointment as soon as possible for a visit         DISCHARGE MEDICATIONS:  New Prescriptions    No medications on file       [address]  08/03/21 5865

## 2021-08-05 ENCOUNTER — APPOINTMENT (OUTPATIENT)
Dept: NUCLEAR MEDICINE | Age: 86
DRG: 418 | End: 2021-08-05
Payer: MEDICARE

## 2021-08-05 ENCOUNTER — APPOINTMENT (OUTPATIENT)
Dept: CT IMAGING | Age: 86
DRG: 418 | End: 2021-08-05
Payer: MEDICARE

## 2021-08-05 ENCOUNTER — HOSPITAL ENCOUNTER (INPATIENT)
Age: 86
LOS: 11 days | Discharge: HOME | DRG: 418 | End: 2021-08-16
Attending: EMERGENCY MEDICINE | Admitting: SURGERY
Payer: MEDICARE

## 2021-08-05 ENCOUNTER — APPOINTMENT (OUTPATIENT)
Dept: GENERAL RADIOLOGY | Age: 86
DRG: 418 | End: 2021-08-05
Payer: MEDICARE

## 2021-08-05 DIAGNOSIS — K81.0 ACUTE CHOLECYSTITIS: Primary | ICD-10-CM

## 2021-08-05 PROBLEM — K80.00 ACUTE CALCULOUS CHOLECYSTITIS: Status: ACTIVE | Noted: 2021-08-05

## 2021-08-05 LAB
ALBUMIN SERPL-MCNC: 3.8 G/DL (ref 3.4–5)
ALP BLD-CCNC: 187 U/L (ref 40–129)
ALT SERPL-CCNC: 29 U/L (ref 10–40)
ANION GAP SERPL CALCULATED.3IONS-SCNC: 15 MMOL/L (ref 3–16)
AST SERPL-CCNC: 32 U/L (ref 15–37)
BASOPHILS ABSOLUTE: 0 K/UL (ref 0–0.2)
BASOPHILS RELATIVE PERCENT: 0.4 %
BILIRUB SERPL-MCNC: 1.1 MG/DL (ref 0–1)
BILIRUBIN DIRECT: 0.4 MG/DL (ref 0–0.3)
BILIRUBIN, INDIRECT: 0.7 MG/DL (ref 0–1)
BUN BLDV-MCNC: 20 MG/DL (ref 7–20)
CALCIUM SERPL-MCNC: 9.1 MG/DL (ref 8.3–10.6)
CHLORIDE BLD-SCNC: 100 MMOL/L (ref 99–110)
CO2: 21 MMOL/L (ref 21–32)
CREAT SERPL-MCNC: 1.3 MG/DL (ref 0.6–1.2)
EKG ATRIAL RATE: 111 BPM
EKG ATRIAL RATE: 68 BPM
EKG DIAGNOSIS: NORMAL
EKG DIAGNOSIS: NORMAL
EKG P AXIS: 75 DEGREES
EKG P AXIS: 87 DEGREES
EKG P-R INTERVAL: 132 MS
EKG P-R INTERVAL: 152 MS
EKG Q-T INTERVAL: 314 MS
EKG Q-T INTERVAL: 412 MS
EKG QRS DURATION: 102 MS
EKG QRS DURATION: 90 MS
EKG QTC CALCULATION (BAZETT): 427 MS
EKG QTC CALCULATION (BAZETT): 438 MS
EKG R AXIS: 30 DEGREES
EKG R AXIS: 48 DEGREES
EKG T AXIS: 1 DEGREES
EKG T AXIS: 54 DEGREES
EKG VENTRICULAR RATE: 111 BPM
EKG VENTRICULAR RATE: 68 BPM
EOSINOPHILS ABSOLUTE: 0 K/UL (ref 0–0.6)
EOSINOPHILS RELATIVE PERCENT: 0.5 %
GFR AFRICAN AMERICAN: 47
GFR NON-AFRICAN AMERICAN: 39
GLUCOSE BLD-MCNC: 172 MG/DL (ref 70–99)
HCT VFR BLD CALC: 42.1 % (ref 36–48)
HEMOGLOBIN: 14.5 G/DL (ref 12–16)
LIPASE: 21 U/L (ref 13–60)
LV EF: 58 %
LVEF MODALITY: NORMAL
LYMPHOCYTES ABSOLUTE: 0.5 K/UL (ref 1–5.1)
LYMPHOCYTES RELATIVE PERCENT: 13 %
MCH RBC QN AUTO: 32.3 PG (ref 26–34)
MCHC RBC AUTO-ENTMCNC: 34.5 G/DL (ref 31–36)
MCV RBC AUTO: 93.8 FL (ref 80–100)
MONOCYTES ABSOLUTE: 0.3 K/UL (ref 0–1.3)
MONOCYTES RELATIVE PERCENT: 7.4 %
NEUTROPHILS ABSOLUTE: 3 K/UL (ref 1.7–7.7)
NEUTROPHILS RELATIVE PERCENT: 78.7 %
PDW BLD-RTO: 15.4 % (ref 12.4–15.4)
PLATELET # BLD: 478 K/UL (ref 135–450)
PMV BLD AUTO: 8.3 FL (ref 5–10.5)
POTASSIUM REFLEX MAGNESIUM: 3.8 MMOL/L (ref 3.5–5.1)
PRO-BNP: 1011 PG/ML (ref 0–449)
RBC # BLD: 4.49 M/UL (ref 4–5.2)
SODIUM BLD-SCNC: 136 MMOL/L (ref 136–145)
TOTAL PROTEIN: 8.3 G/DL (ref 6.4–8.2)
TROPONIN: <0.01 NG/ML
TROPONIN: <0.01 NG/ML
WBC # BLD: 3.8 K/UL (ref 4–11)

## 2021-08-05 PROCEDURE — 96375 TX/PRO/DX INJ NEW DRUG ADDON: CPT

## 2021-08-05 PROCEDURE — 74177 CT ABD & PELVIS W/CONTRAST: CPT

## 2021-08-05 PROCEDURE — 85025 COMPLETE CBC W/AUTO DIFF WBC: CPT

## 2021-08-05 PROCEDURE — 6360000002 HC RX W HCPCS: Performed by: EMERGENCY MEDICINE

## 2021-08-05 PROCEDURE — 80076 HEPATIC FUNCTION PANEL: CPT

## 2021-08-05 PROCEDURE — 2500000003 HC RX 250 WO HCPCS: Performed by: SURGERY

## 2021-08-05 PROCEDURE — 83880 ASSAY OF NATRIURETIC PEPTIDE: CPT

## 2021-08-05 PROCEDURE — 36415 COLL VENOUS BLD VENIPUNCTURE: CPT

## 2021-08-05 PROCEDURE — 2580000003 HC RX 258: Performed by: SURGERY

## 2021-08-05 PROCEDURE — 96374 THER/PROPH/DIAG INJ IV PUSH: CPT

## 2021-08-05 PROCEDURE — 80048 BASIC METABOLIC PNL TOTAL CA: CPT

## 2021-08-05 PROCEDURE — 99223 1ST HOSP IP/OBS HIGH 75: CPT | Performed by: INTERNAL MEDICINE

## 2021-08-05 PROCEDURE — 94150 VITAL CAPACITY TEST: CPT

## 2021-08-05 PROCEDURE — 84484 ASSAY OF TROPONIN QUANT: CPT

## 2021-08-05 PROCEDURE — 71045 X-RAY EXAM CHEST 1 VIEW: CPT

## 2021-08-05 PROCEDURE — 6360000004 HC RX CONTRAST MEDICATION: Performed by: EMERGENCY MEDICINE

## 2021-08-05 PROCEDURE — 2580000003 HC RX 258: Performed by: EMERGENCY MEDICINE

## 2021-08-05 PROCEDURE — 93010 ELECTROCARDIOGRAM REPORT: CPT | Performed by: INTERNAL MEDICINE

## 2021-08-05 PROCEDURE — 6360000002 HC RX W HCPCS: Performed by: SURGERY

## 2021-08-05 PROCEDURE — 94664 DEMO&/EVAL PT USE INHALER: CPT

## 2021-08-05 PROCEDURE — 93005 ELECTROCARDIOGRAM TRACING: CPT | Performed by: EMERGENCY MEDICINE

## 2021-08-05 PROCEDURE — 99284 EMERGENCY DEPT VISIT MOD MDM: CPT

## 2021-08-05 PROCEDURE — 1200000000 HC SEMI PRIVATE

## 2021-08-05 PROCEDURE — 83690 ASSAY OF LIPASE: CPT

## 2021-08-05 PROCEDURE — 93306 TTE W/DOPPLER COMPLETE: CPT

## 2021-08-05 PROCEDURE — 93005 ELECTROCARDIOGRAM TRACING: CPT | Performed by: INTERNAL MEDICINE

## 2021-08-05 RX ORDER — SODIUM CHLORIDE, SODIUM LACTATE, POTASSIUM CHLORIDE, CALCIUM CHLORIDE 600; 310; 30; 20 MG/100ML; MG/100ML; MG/100ML; MG/100ML
INJECTION, SOLUTION INTRAVENOUS CONTINUOUS
Status: DISCONTINUED | OUTPATIENT
Start: 2021-08-05 | End: 2021-08-08

## 2021-08-05 RX ORDER — MORPHINE SULFATE 2 MG/ML
2 INJECTION, SOLUTION INTRAMUSCULAR; INTRAVENOUS ONCE
Status: COMPLETED | OUTPATIENT
Start: 2021-08-05 | End: 2021-08-05

## 2021-08-05 RX ORDER — ONDANSETRON 2 MG/ML
4 INJECTION INTRAMUSCULAR; INTRAVENOUS ONCE
Status: COMPLETED | OUTPATIENT
Start: 2021-08-05 | End: 2021-08-05

## 2021-08-05 RX ORDER — 0.9 % SODIUM CHLORIDE 0.9 %
1000 INTRAVENOUS SOLUTION INTRAVENOUS ONCE
Status: COMPLETED | OUTPATIENT
Start: 2021-08-05 | End: 2021-08-05

## 2021-08-05 RX ORDER — METOPROLOL TARTRATE 5 MG/5ML
2.5 INJECTION INTRAVENOUS EVERY 6 HOURS
Status: DISCONTINUED | OUTPATIENT
Start: 2021-08-05 | End: 2021-08-09

## 2021-08-05 RX ORDER — ONDANSETRON 4 MG/1
4 TABLET, ORALLY DISINTEGRATING ORAL EVERY 8 HOURS PRN
Status: DISCONTINUED | OUTPATIENT
Start: 2021-08-05 | End: 2021-08-16 | Stop reason: HOSPADM

## 2021-08-05 RX ORDER — SODIUM CHLORIDE 0.9 % (FLUSH) 0.9 %
5-40 SYRINGE (ML) INJECTION PRN
Status: DISCONTINUED | OUTPATIENT
Start: 2021-08-05 | End: 2021-08-16 | Stop reason: HOSPADM

## 2021-08-05 RX ORDER — ONDANSETRON 2 MG/ML
4 INJECTION INTRAMUSCULAR; INTRAVENOUS EVERY 6 HOURS PRN
Status: DISCONTINUED | OUTPATIENT
Start: 2021-08-05 | End: 2021-08-16 | Stop reason: HOSPADM

## 2021-08-05 RX ORDER — MORPHINE SULFATE 4 MG/ML
4 INJECTION, SOLUTION INTRAMUSCULAR; INTRAVENOUS ONCE
Status: COMPLETED | OUTPATIENT
Start: 2021-08-05 | End: 2021-08-05

## 2021-08-05 RX ORDER — SODIUM CHLORIDE 9 MG/ML
25 INJECTION, SOLUTION INTRAVENOUS PRN
Status: DISCONTINUED | OUTPATIENT
Start: 2021-08-05 | End: 2021-08-16 | Stop reason: HOSPADM

## 2021-08-05 RX ORDER — SODIUM CHLORIDE 0.9 % (FLUSH) 0.9 %
5-40 SYRINGE (ML) INJECTION EVERY 12 HOURS SCHEDULED
Status: DISCONTINUED | OUTPATIENT
Start: 2021-08-05 | End: 2021-08-16 | Stop reason: HOSPADM

## 2021-08-05 RX ADMIN — SODIUM CHLORIDE 1000 ML: 0.9 INJECTION, SOLUTION INTRAVENOUS at 06:38

## 2021-08-05 RX ADMIN — DEXTROSE MONOHYDRATE 3375 MG: 5 INJECTION INTRAVENOUS at 06:38

## 2021-08-05 RX ADMIN — METOPROLOL TARTRATE 2.5 MG: 5 INJECTION INTRAVENOUS at 22:53

## 2021-08-05 RX ADMIN — HYDROMORPHONE HYDROCHLORIDE 0.25 MG: 1 INJECTION, SOLUTION INTRAMUSCULAR; INTRAVENOUS; SUBCUTANEOUS at 20:35

## 2021-08-05 RX ADMIN — IOPAMIDOL 80 ML: 755 INJECTION, SOLUTION INTRAVENOUS at 03:28

## 2021-08-05 RX ADMIN — ENOXAPARIN SODIUM 30 MG: 40 INJECTION SUBCUTANEOUS at 09:53

## 2021-08-05 RX ADMIN — HYDROMORPHONE HYDROCHLORIDE 0.5 MG: 1 INJECTION, SOLUTION INTRAMUSCULAR; INTRAVENOUS; SUBCUTANEOUS at 09:53

## 2021-08-05 RX ADMIN — MORPHINE SULFATE 2 MG: 2 INJECTION, SOLUTION INTRAMUSCULAR; INTRAVENOUS at 06:38

## 2021-08-05 RX ADMIN — MORPHINE SULFATE 4 MG: 4 INJECTION INTRAVENOUS at 02:34

## 2021-08-05 RX ADMIN — SODIUM CHLORIDE, POTASSIUM CHLORIDE, SODIUM LACTATE AND CALCIUM CHLORIDE: 600; 310; 30; 20 INJECTION, SOLUTION INTRAVENOUS at 09:54

## 2021-08-05 RX ADMIN — ONDANSETRON 4 MG: 2 INJECTION INTRAMUSCULAR; INTRAVENOUS at 02:34

## 2021-08-05 ASSESSMENT — PAIN DESCRIPTION - DESCRIPTORS
DESCRIPTORS: ACHING
DESCRIPTORS: CONSTANT;DISCOMFORT

## 2021-08-05 ASSESSMENT — ENCOUNTER SYMPTOMS
WHEEZING: 0
EYE DISCHARGE: 0
NAUSEA: 1
DIARRHEA: 0
RHINORRHEA: 0
EYE ITCHING: 0
FACIAL SWELLING: 0
PHOTOPHOBIA: 0
VOMITING: 0
EYE PAIN: 0
SHORTNESS OF BREATH: 1
SORE THROAT: 0
CHEST TIGHTNESS: 0
ABDOMINAL PAIN: 1
BACK PAIN: 0
COUGH: 0

## 2021-08-05 ASSESSMENT — PAIN DESCRIPTION - FREQUENCY
FREQUENCY: CONTINUOUS
FREQUENCY: CONTINUOUS

## 2021-08-05 ASSESSMENT — PAIN - FUNCTIONAL ASSESSMENT: PAIN_FUNCTIONAL_ASSESSMENT: PREVENTS OR INTERFERES SOME ACTIVE ACTIVITIES AND ADLS

## 2021-08-05 ASSESSMENT — PAIN DESCRIPTION - ORIENTATION: ORIENTATION: MID

## 2021-08-05 ASSESSMENT — PAIN DESCRIPTION - PAIN TYPE
TYPE: ACUTE PAIN
TYPE: ACUTE PAIN

## 2021-08-05 ASSESSMENT — PAIN DESCRIPTION - PROGRESSION
CLINICAL_PROGRESSION: NOT CHANGED
CLINICAL_PROGRESSION: NOT CHANGED

## 2021-08-05 ASSESSMENT — PAIN DESCRIPTION - LOCATION
LOCATION: ABDOMEN
LOCATION: ABDOMEN

## 2021-08-05 ASSESSMENT — PAIN SCALES - GENERAL
PAINLEVEL_OUTOF10: 5
PAINLEVEL_OUTOF10: 5
PAINLEVEL_OUTOF10: 10
PAINLEVEL_OUTOF10: 10
PAINLEVEL_OUTOF10: 5
PAINLEVEL_OUTOF10: 7

## 2021-08-05 ASSESSMENT — PAIN DESCRIPTION - ONSET
ONSET: ON-GOING
ONSET: ON-GOING

## 2021-08-05 NOTE — ED PROVIDER NOTES
[address] [facility]          ATTENDING PHYSICIAN NOTE       Date of evaluation: 8/5/2021    Chief Complaint     Abdominal Pain      History of Present Illness     Windy Carlos is a 80 y.o. female who presents with a chief complaint of gallbladder attack. Patient was seen here 2 days ago and found to have signs on CT of acute versus chronic cholecystitis and general surgery was going to admit her to obtain a HIDA scan, but patient ultimately went home because she felt well and was going to follow-up and get outpatient HIDA scan. Patient now re-presents with worsening pain in her abdomen. Patient son at bedside provides most of the history. He states that after leaving the hospital the patient felt well and all of yesterday she felt well and was able to eat a little bit of food for breakfast, lunch, dinner. He states that about an hour ago patient woke him up stating she was in pain again, and has been having constant abdominal pain since that time. Patient states she has pain in her lower abdomen, denies radiation to her back or chest.  She does not know if she is short of breath. She is nauseous and vomiting. No diarrhea and no fevers. Feels similar to the pain that she had 2 days ago when she came to the ER. Review of Systems     Review of Systems   Constitutional: Negative for activity change, appetite change, chills and fever. HENT: Negative for congestion, ear pain, facial swelling, nosebleeds, rhinorrhea and sore throat. Eyes: Negative for photophobia, pain, discharge, itching and visual disturbance. Respiratory: Positive for shortness of breath (patient says \"maybe i'm short of breath i don't know\"). Negative for cough, chest tightness and wheezing. Cardiovascular: Negative for chest pain, palpitations and leg swelling. Gastrointestinal: Positive for abdominal pain and nausea. Negative for diarrhea and vomiting.    Endocrine: Negative for cold intolerance, heat intolerance, polydipsia and polyuria. Genitourinary: Negative for dysuria, flank pain, hematuria, pelvic pain, vaginal bleeding and vaginal discharge. Musculoskeletal: Negative for arthralgias, back pain, joint swelling, myalgias and neck pain. Skin: Negative for rash and wound. Allergic/Immunologic: Negative for environmental allergies. Neurological: Negative for dizziness, tremors, seizures, syncope, weakness, light-headedness and headaches. Hematological: Negative for adenopathy. Does not bruise/bleed easily. Psychiatric/Behavioral: Negative for confusion and hallucinations. Past Medical, Surgical, Family, and Social History     She has a past medical history of CVA (cerebral vascular accident) ([facility] [address] and Hypertension. She has no past surgical history on file. Her family history includes Diabetes in her brother. She reports that she has never smoked. She has never used smokeless tobacco. She reports that she does not drink alcohol and does not use drugs. Medications     Previous Medications    AMLODIPINE (NORVASC) 10 MG TABLET    Take 1 tablet by mouth daily    ASPIRIN 81 MG EC TABLET    Take 1 tablet by mouth daily    ATORVASTATIN (LIPITOR) 40 MG TABLET    Take 1 tablet by mouth nightly    CLOPIDOGREL (PLAVIX) 75 MG TABLET    Take 1 tablet by mouth daily    CLOPIDOGREL (PLAVIX) 75 MG TABLET    TAKE 1 TABLET BY MOUTH DAILY    LISINOPRIL (PRINIVIL;ZESTRIL) 10 MG TABLET    Take 1 tablet by mouth daily    METOPROLOL TARTRATE (LOPRESSOR) 25 MG TABLET    Take 1 tablet by mouth 2 times daily       Allergies     She has No Known Allergies. Physical Exam     INITIAL VITALS: BP: (!) 151/85, Temp: 98.1 °F (36.7 °C), Pulse: 116, Resp: 18, SpO2: 98 %   Physical Exam  Constitutional:       Appearance: She is well-developed. She is not diaphoretic. Comments: Moaning and grabbing at abdomen while I am in the room   HENT:      Head: Normocephalic and atraumatic. Mouth/Throat:      Pharynx: No oropharyngeal exudate. Eyes:      General:         Right eye: No discharge. Left eye: No discharge. Conjunctiva/sclera: Conjunctivae normal.      Pupils: Pupils are equal, round, and reactive to light. Neck:      Thyroid: No thyromegaly. Vascular: No JVD. Trachea: No tracheal deviation. Cardiovascular:      Rate and Rhythm: Regular rhythm. Tachycardia present. Heart sounds: Normal heart sounds. No murmur heard. No friction rub. No gallop. Pulmonary:      Effort: Pulmonary effort is normal. No respiratory distress. Breath sounds: Normal breath sounds. No stridor. No wheezing or rales. Chest:      Chest wall: No tenderness. Abdominal:      General: Bowel sounds are normal. There is no distension. Palpations: Abdomen is soft. Tenderness: There is abdominal tenderness in the right upper quadrant and right lower quadrant. There is no guarding or rebound. Musculoskeletal:         General: No tenderness or deformity. Normal range of motion. Cervical back: Normal range of motion and neck supple. Lymphadenopathy:      Cervical: No cervical adenopathy. Skin:     General: Skin is warm and dry. Findings: No erythema or rash. Neurological:      Mental Status: She is alert and oriented to person, place, and time. Cranial Nerves: No cranial nerve deficit. Coordination: Coordination normal.   Psychiatric:         Behavior: Behavior normal.         Diagnostic Results     EKG   Indication: Tachycardia on monitor. Heart rate 111, sinus tachycardia with frequent PACs, no signs of ST elevation but diffuse ST depressions and T wave inversions. Comparison to prior EKG reveals ST depressions and T wave inversions are new even from EKG 2 days ago when patient was also in mild distress.   Impression: Sinus tachycardia with likely demand ischemia    RADIOLOGY:  CT ABDOMEN PELVIS W IV CONTRAST Additional Contrast? None   Final Result   1. Probable acute cholecystitis with cholelithiasis, ultrasound    confirmation may be considered.               LABS:   Results for orders placed or performed during the hospital encounter of 08/05/21   CBC Auto Differential   Result Value Ref Range    WBC 3.8 (L) 4.0 - 11.0 K/uL    RBC 4.49 4.00 - 5.20 M/uL    Hemoglobin 14.5 12.0 - 16.0 g/dL    Hematocrit 42.1 36.0 - 48.0 %    MCV 93.8 80.0 - 100.0 fL    MCH 32.3 26.0 - 34.0 pg    MCHC 34.5 31.0 - 36.0 g/dL    RDW 15.4 12.4 - 15.4 %    Platelets 214 (H) 488 - 450 K/uL    MPV 8.3 5.0 - 10.5 fL    Neutrophils % 78.7 %    Lymphocytes % 13.0 %    Monocytes % 7.4 %    Eosinophils % 0.5 %    Basophils % 0.4 %    Neutrophils Absolute 3.0 1.7 - 7.7 K/uL    Lymphocytes Absolute 0.5 (L) 1.0 - 5.1 K/uL    Monocytes Absolute 0.3 0.0 - 1.3 K/uL    Eosinophils Absolute 0.0 0.0 - 0.6 K/uL    Basophils Absolute 0.0 0.0 - 0.2 K/uL   Basic Metabolic Panel w/ Reflex to MG   Result Value Ref Range    Sodium 136 136 - 145 mmol/L    Potassium reflex Magnesium 3.8 3.5 - 5.1 mmol/L    Chloride 100 99 - 110 mmol/L    CO2 21 21 - 32 mmol/L    Anion Gap 15 3 - 16    Glucose 172 (H) 70 - 99 mg/dL    BUN 20 7 - 20 mg/dL    CREATININE 1.3 (H) 0.6 - 1.2 mg/dL    GFR Non-African American 39 (A) >60    GFR  47 (A) >60    Calcium 9.1 8.3 - 10.6 mg/dL   Hepatic Function Panel   Result Value Ref Range    Total Protein 8.3 (H) 6.4 - 8.2 g/dL    Albumin 3.8 3.4 - 5.0 g/dL    Alkaline Phosphatase 187 (H) 40 - 129 U/L    ALT 29 10 - 40 U/L    AST 32 15 - 37 U/L    Total Bilirubin 1.1 (H) 0.0 - 1.0 mg/dL    Bilirubin, Direct 0.4 (H) 0.0 - 0.3 mg/dL    Bilirubin, Indirect 0.7 0.0 - 1.0 mg/dL   Lipase   Result Value Ref Range    Lipase 21.0 13.0 - 60.0 U/L   Troponin   Result Value Ref Range    Troponin <0.01 <0.01 ng/mL   Brain Natriuretic Peptide   Result Value Ref Range    Pro-BNP 1,011 (H) 0 - 449 pg/mL   EKG 12 Lead   Result Value Ref Range    Ventricular Rate 111 BPM Atrial Rate 111 BPM    P-R Interval 132 ms    QRS Duration 90 ms    Q-T Interval 314 ms    QTc Calculation (Bazett) 427 ms    P Axis 75 degrees    R Axis 48 degrees    T Axis 1 degrees    Diagnosis       EKG performed in ER and to be interpreted by ER physician. Confirmed by MD, ER ([id number]),  Brad Orourke ([id number]) on 8/5/2021 5:47:01 AM       ED BEDSIDE ULTRASOUND:  none    RECENT VITALS:  BP: (!) 95/49,Temp: 98.1 °F (36.7 °C), Pulse: 82, Resp: 28, SpO2: 94 %     Procedures     none    ED Course     Nursing Notes, Past Medical Hx, Past Surgical Hx, Social Hx,Allergies, and Family Hx were reviewed. patient was given the following medications:  Orders Placed This Encounter   Medications    morphine injection 4 mg    ondansetron (ZOFRAN) injection 4 mg    iopamidol (ISOVUE-370) 76 % injection 80 mL    morphine (PF) injection 2 mg    piperacillin-tazobactam (ZOSYN) 3,375 mg in dextrose 5 % 50 mL IVPB (mini-bag)     Order Specific Question:   Antimicrobial Indications     Answer:   Intra-Abdominal Infection    0.9 % sodium chloride bolus       CONSULTS:  IP CONSULT TO [address] [place] [address] DECISIONMAKING / ASSESSMENT / PLAN     Mamadou Joseph is a 80 y.o. female who presents with a chief complaint of abdominal pain. Initial exam reveals a female who is very uncomfortable but not in any acute distress, tachycardic, normal blood pressure, normal oxygenation, afebrile. Physical exam remarkable for diffuse abdominal tenderness to palpation worse on the right side of the abdomen. Pain sound similar to when she was here 2 days ago and was found to have large gallstones. I did go ahead and reobtain labs as well as EKG and troponin given tachycardia on the monitor. Signs of demand ischemia on EKG but negative troponin. Creatinine slightly elevated from prior at 1.3 from baseline of less than 1, bilirubin 1.1 from a previous of 0.8, white count 3.8 from a previous of 9.4.   Given slight lab abnormalities I did go ahead and attempt a bedside ultrasound but was unable to visualize the gallbladder due to patient's pain. Therefore I went ahead and obtained a CT abdomen and pelvis. This revealed acute cholecystitis. Images better for gallbladder then when patient had CT aorta obtained 2 days ago. Surgery came to evaluate the patient and will admit her and likely take her to surgery. Patient given Zosyn, fluids, additional pain medication. Stable on several reassessments. Heart rate improved to 82 and patient comfortable on several reassessments. Repeat troponin ordered. Clinical Impression     1. Acute cholecystitis        Disposition     PATIENT REFERRED TO:  No follow-up provider specified.     DISCHARGE MEDICATIONS:  New Prescriptions    No medications on file       DISPOSITION  - admit to surgery       Giacomo Browne MD  08/05/21 2634

## 2021-08-05 NOTE — CONSULTS
[facility]  Cardiology Inpatient Consult Service                                                                                          Pt Name: Kylah Wilkerson  Age: 80 y.o. Sex: female  : 1932  Location: [address] [place] [address]    Referring Physician: Kalyani Baird MD      Reason for Consult:       Reason for Consultation/Chief Complaint: Pre-operative risk stratification-- laparoscopic cholecystomy      HPI:      Kylah Wilkerson is a 80 y.o. female with a past medical history of CVA, carotid stenosis, hypertensive emergency who presented to the hospital with epigastric pain of 5 day duration. Diagnosis of acute calculous cholecystitis. Cardiology is consulted for preoperative risk stratification and cardiac clearance. She denies chest pain, shortness of breath, orthopnea, headache, dizziness. Patient complains of epigastric severe epigastric pain. Histories     Past Medical History:   has a past medical history of CVA (cerebral vascular accident) ([address] and Hypertension. Surgical History:   has no past surgical history on file. Social History:   reports that she has never smoked. She has never used smokeless tobacco. She reports that she does not drink alcohol and does not use drugs. Family History:  No evidence for sudden cardiac death or premature CAD      Medications:       Home Medications  Were reviewed and are listed in nursing record. and/or listed below  Prior to Admission medications    Medication Sig Start Date End Date Taking?  Authorizing Provider   clopidogrel (PLAVIX) 75 MG tablet TAKE 1 TABLET BY MOUTH DAILY 19  Robert Norman MD   lisinopril (PRINIVIL;ZESTRIL) 10 MG tablet Take 1 tablet by mouth daily 3/2/19   Dhara Kemp MD   aspirin 81 MG EC tablet Take 1 tablet by mouth daily 3/1/19   Dhara Kemp MD   atorvastatin (LIPITOR) 40 MG tablet Take 1 tablet by mouth nightly 19   Dhara Kemp MD   metoprolol tartrate (LOPRESSOR) 25 MG tablet Take 1 tablet by mouth 2 times daily 2/28/19   Stacie Floyd MD   amLODIPine (NORVASC) 10 MG tablet Take 1 tablet by mouth daily 3/1/19   Stacie Floyd MD   clopidogrel (PLAVIX) 75 MG tablet Take 1 tablet by mouth daily 3/1/19   Stacie Floyd MD          Inpatient Medications:   sodium chloride flush  5-40 mL Intravenous 2 times per day    enoxaparin  30 mg Subcutaneous Daily    metoprolol  2.5 mg Intravenous Q6H       IV drips:   sodium chloride      lactated ringers 100 mL/hr at 08/05/21 0954       PRN:  sodium chloride flush, sodium chloride, ondansetron **OR** ondansetron, HYDROmorphone **OR** HYDROmorphone    Allergy:     Patient has no known allergies. Review of Systems:     All 12 point review of symptoms completed. Pertinent positives identified in the HPI, all other review of symptoms negative as below. CONSTITUTIONAL: Nounanticipated weight loss. No change in energy level, sleep pattern, or activity level. appears in sever pain  SKIN: No rash or pruritis. EYES: No visual changes or diplopia. No scleral icterus. ENT: No Headaches, hearing loss or vertigo. No mouth sores or sore throat. CARDIOVASCULAR: No chest pain/chest pressure/chest discomfort. No palpitations. RESPIRATORY: No cough or wheezing, no sputum production. No hematemesis. GASTROINTESTINAL: No N/V/D. Positive abdominal pain, appetite loss, blood in stools. GENITOURINARY: No dysuria, trouble voiding, or hematuria. MUSCULOSKELETAL:  No gait disturbance, weakness or joint complaints. NEUROLOGICAL: No headache, diplopia, change in muscle strength, numbness or tingling. No change in gait, balance, coordination, mood, affect, memory, mentation, behavior. PSHYCH: No anxiety, loss of interest, change in sexual behavior, feelings of self-harm, or confusion. ENDOCRINE: No malaise, fatigue or temperature intolerance. No excessive thirst, fluid intake, or urination. No tremor.   HEMATOLOGIC: No abnormal bruising or bleeding. ALLERGY: No nasal congestion or hives. Physical Examination:     Vitals:    08/05/21 0600 08/05/21 0615 08/05/21 0630 08/05/21 0855   BP:    113/68   Pulse: 85 80 83 74   Resp: 23 23 24 20   Temp:    98 °F (36.7 °C)   TempSrc:    Oral   SpO2: 95% 93% 94% 94%   Weight:       Height:           Wt Readings from Last 3 Encounters:   08/05/21 161 lb 12.8 oz (73.4 kg)   08/03/21 150 lb (68 kg)   12/16/19 154 lb (69.9 kg)       Objective      General Appearance:  Alert, cooperative, no distress, appears stated age Appropriate weight   Head:  Normocephalic, without obvious abnormality, atraumatic   Eyes:  PERRL, conjunctiva/corneas clear EOM intact  Ears normal   Throat no lesions       Nose: Nares normal, no drainage or sinus tenderness   Throat: Lips, mucosa, and tongue normal   Neck: Supple, symmetrical, trachea midline, no adenopathy, thyroid: not enlarged, symmetric, no tenderness/mass/nodules, no carotid bruit or JVD       Lungs:   Clear to auscultation bilaterally, respirations unlabored   Chest Wall:  No tenderness or deformity   Heart:  Regular rate and rhythm, S1, S2 normal, no murmur, rub or gallop PMI intact   Abdomen:   Soft, tender, bowel sounds active all four quadrants       Extremities: Extremities normal, atraumatic, no cyanosis or edema   Pulses: 2+ and symmetric   Skin: Skin color, texture, turgor normal, no rashes or lesions   Pysch: Normal mood and affect   Neurologic: Normal gross motor and sensory exam.  Cranial nerves intact        Labs:     Recent Labs     08/03/21 0949 08/05/21 0229   * 136   K 3.9 3.8   BUN 14 20   CREATININE 1.0 1.3*    100   CO2 22 21   GLUCOSE 176* 172*   CALCIUM 9.0 9.1     Recent Labs     08/03/21 0949 08/03/21 0949 08/05/21 0229   WBC 9.4  --  3.8*   HGB 13.8  --  14.5   HCT 39.4  --  42.1     --  478*   MCV 92.1   < > 93.8    < > = values in this interval not displayed.      No results for input(s): CHOLTOT, TRIG, HDL in the last 72 hours.    Invalid input(s): LIPIDCOMM, CHOLHDL, VLDCHOL, LDL  No results for input(s): PTT, INR in the last 72 hours. Invalid input(s): PT  Recent Labs     08/03/21  0949 08/05/21  0229 08/05/21  0651   TROPONINI <0.01 <0.01 <0.01     No results for input(s): BNP in the last 72 hours. No results for input(s): TSH in the last 72 hours. No results for input(s): CHOL, HDL, LDLCALC, TRIG in the last 72 hours.]    Lab Results   Component Value Date    TROPONINI <0.01 08/05/2021         Imaging:     I personally reviewed imaging studies including CXR, Stress test, TTE/REMEDIOS. Last ECG (if available) - EKG:  I have reviewed EKG with the following interpretation  Sinus rhythm with PAC and PVC    Telemetry:NSR    Last Stress (if available):    Last TTE/REMEDIOS(if available):  2/27/2019 Echo:   Normal left ventricle size, wall thickness, and systolic function with an   estimated ejection fraction of 55-60%. No regional wall motion abnormalities      Last Cath (if available): N/A    Last CMR  (if available): N/A      Assessment / Plan:   Preoperative cardiac clearance   Denies CP, SOB, orthopnea. Negative nuclear study (2019). - ST segment depressions with elevated HR on EKG-  we will repeat the EKG  - Check echo at bedside. If LVEF normal proceed with surgery with expected cardiac risk      Tobacco use was discussed with the patient and educated on the negative effects. I have asked the patient to not utilize these agents. This patient was staffed with the attending physician, Dr. Joey Martinez MD.    Robert Daring you for allowing to us to participate in the care of Vishnu Kaur. Please call our service with questions. Enrigue Sic MS-4  Bradley Nelson MD  PGY-2      Staff Note      Patient seen and evaluated with the medical resident. I was physically present during the critical portions of the service when performed by the resident including the assessment and management of the patient.  ECG today is SR and ST segments much improved with control of HR. Pt has no chest pain. Echo overall WNL. Pt is at acceptable risk for cholecystectomy from the cardiac standpoint and may proceed. I agree with the findings and plans as described.

## 2021-08-05 NOTE — ED TRIAGE NOTES
Pt came into the ED with c/o abd pain. Pt states that she has gall stones and they may need to come out.

## 2021-08-05 NOTE — PROGRESS NOTES
Patient alert and oriented x4. VSS. Patient abdomen tender and distended with hypoactive bowel sounds present. Patient has been complaining of intermittent abd pain, prn pain medication given with benefit. Voiding with no issues. Resting comfortably. Patient denies any other needs at this time. Bed is in the lowest position, call light and bedside table within reach. Patient bed alarm is on. Will continue to monitor for changes in patient status.      Electronically signed by Moses Phillip RN on 8/5/2021 at 6:01 PM

## 2021-08-05 NOTE — PROGRESS NOTES
PRE-OP NOTE  Department of Surgery      Chief Complaint or Reason for Surgery: Symptomatic cholelithiasis    Procedure: Laparoscopic cholecystectomy  Expected time: Add-on    Plan  1. Diet: NPO at [address]  2. IVF:   3. Antibiotics: Rocephin and Flagyl on call to OR  4. Labs to be drawn: Type and Screen, INR  5. Anesthesia: to see patient  6. Consent: Obtained, in the physical chart  7. Pulmonary: CXR: no, CT chest from 8/3/21 showed no consolidations, and patency of central tracheobronchial tree  8. Cardiac: EKG: from 8/5/21: Sinus rhythm with PACs, no ST changes  9.  Pregnancy test: Not indicated 2/2 patient age    Cortés GraciaDO  08/05/21  4:16 PM

## 2021-08-05 NOTE — H&P
Resident History and Physical   General Surgery      Chief Complaint: Abdominal pain    History of Present Illness:    Windy Carlos is a 80year old female with history of CVA and hypertension who recently presented to the emergency department with complaints of epigastric pain of 5 day duration, which reportedly waxed and waned. At that time the patient was found to have a large gallstone but no radiographic signs of cholecystitis. Her pain had resolved in the ED and she wished to pursue eventual elective cholecystectomy after further workup with outpatient HIDA. Unfortunately, last night the patient experienced return of abdominal pain that initially resolved on its own but later awoke her from sleep and has been unremitting since. She reports no nausea at this time and has experienced no episodes of emesis. The patient reports no fevers and no chills. She endorses normal BMs and urination. Past Medical History:        Diagnosis Date    CVA (cerebral vascular accident) ([facility] [address] 03/2019    Hypertension      Past Surgical History:     No past surgical history on file. Allergies:  Patient has no known allergies. Medications:   Home Meds  No current facility-administered medications on file prior to encounter.      Current Outpatient Medications on File Prior to Encounter   Medication Sig Dispense Refill    clopidogrel (PLAVIX) 75 MG tablet TAKE 1 TABLET BY MOUTH DAILY 30 tablet 0    lisinopril (PRINIVIL;ZESTRIL) 10 MG tablet Take 1 tablet by mouth daily 30 tablet 3    aspirin 81 MG EC tablet Take 1 tablet by mouth daily 30 tablet 3    atorvastatin (LIPITOR) 40 MG tablet Take 1 tablet by mouth nightly 30 tablet 3    metoprolol tartrate (LOPRESSOR) 25 MG tablet Take 1 tablet by mouth 2 times daily 60 tablet 5    amLODIPine (NORVASC) 10 MG tablet Take 1 tablet by mouth daily 30 tablet 3    clopidogrel (PLAVIX) 75 MG tablet Take 1 tablet by mouth daily 30 tablet 0     Current Meds  morphine (PF) injection 2 mg, Once  piperacillin-tazobactam (ZOSYN) 3,375 mg in dextrose 5 % 50 mL IVPB (mini-bag), Once  0.9 % sodium chloride bolus, Once      Family History:   Family History   Problem Relation Age of Onset    Diabetes Brother        Social History:   TOBACCO:   reports that she has never smoked. She has never used smokeless tobacco.  ETOH:   reports no history of alcohol use. DRUGS:   reports no history of drug use. Review of Systems:   A 14 point review of systems was conducted, significant findings as noted in HPI. All other systems negative. Physical Exam:    Vitals:    08/05/21 0500 08/05/21 0515 08/05/21 0530 08/05/21 0545   BP:       Pulse: 77 81 83 82   Resp: 22 21 24 28   Temp:       TempSrc:       SpO2: 93% 93% 93% 94%   Weight:       Height:         General appearance: Alert; appears uncomfortable   HEENT: Normocephalic/atraumatic; eyes grossly normal   Chest/Lungs: Normal effort; breathing room air, symmetric chest rise  Cardiovascular: Regular rate and rhythm  Abdomen: Mildly-distended; soft; markedly tender in the RUQ  Skin: Warm and dry; no exanthems  Extremities: No edema; no cyanosis  Neuro: A&Ox3; no focal deficits; sensation intact    Laboratory Results:    CBC:   Recent Labs     08/03/21  0949 08/05/21 0229   WBC 9.4 3.8*   HGB 13.8 14.5   HCT 39.4 42.1   MCV 92.1 93.8    478*     BMP:   Recent Labs     08/03/21  0949 08/05/21 0229   * 136   K 3.9 3.8    100   CO2 22 21   BUN 14 20   CREATININE 1.0 1.3*     PT/INR: No results for input(s): PROTIME, INR in the last 72 hours. APTT: No results for input(s): APTT in the last 72 hours.   Liver Profile:  Lab Results   Component Value Date    AST 32 08/05/2021    ALT 29 08/05/2021    BILIDIR 0.4 08/05/2021    BILITOT 1.1 08/05/2021    ALKPHOS 187 08/05/2021     Lab Results   Component Value Date    CHOL 185 02/28/2019    HDL 54 02/28/2019    TRIG 116 02/28/2019     UA:   Lab Results   Component Value Date    COLORU Yellow 08/03/2021    PHUR 6.0 08/03/2021    WBCUA 3-5 08/03/2021    RBCUA 0-2 08/03/2021    BACTERIA 1+ 08/03/2021    CLARITYU Clear 08/03/2021    SPECGRAV 1.020 08/03/2021    LEUKOCYTESUR TRACE 08/03/2021    UROBILINOGEN 1.0 08/03/2021    BILIRUBINUR Negative 08/03/2021    BLOODU TRACE-INTACT 08/03/2021    GLUCOSEU Negative 08/03/2021    AMORPHOUS Rare 02/26/2019     Imaging:   CT ABDOMEN PELVIS W IV CONTRAST Additional Contrast? None   Final Result   1. Probable acute cholecystitis with cholelithiasis, ultrasound    confirmation may be considered. Assessment/Plan: This is a 80 y.o. female who was recently diagnosed with symptomatic cholelithiasis in the ED a couple of days ago and wished to pursue further evaluation on an outpatient basis. Unfortunately, last night she experienced return of abdominal pain which has been unremitting since, prompting her to return to the ED. The patient is afebrile; however, she is now leukopenic to 3.8 and tachycardic to the 110s in the setting of marked abdominal pain and CT findings consistent with acute calculus cholecystitis. - Will start Zosyn in the ED  - Patient to remain NPO with IVF  - Will admit to the Surgery service with plan for laparoscopic cholecystectomy tomorrow after appropriate risk stratification   - Cardiology consulted for preoperative risk assessment wit elevated pro-BNP   - The patient wishes to pursue surgical intervention after discussion regarding the risks, benefits, and alternatives of the procedure.    - Lovenox and SCDs for VTE PPx  - IS for pulmonary toilet  - Patient discussed with Dr. Tamiko Jovel MD, MPH  PGY-3 General Surgery  08/05/21  6:26 AM

## 2021-08-05 NOTE — PLAN OF CARE
Problem: Falls - Risk of:  Goal: Absence of physical injury  Description: Absence of physical injury  Note: Pt has remained free of injury. Pt was instructed to call out for assistance. Will continue to monitor. Problem: Pain:  Goal: Control of acute pain  Description: Control of acute pain  8/5/2021 1627 by Rey Shah RN  Note: Pt is resting comfortably in bed. Pain being controlled by IV pain medication. Pt has not asked for any since this morning. Will continue to monitor for any changes in pain level. 8/5/2021 1613 by Rey Shah RN  Note: Pt has been resting comfortably. Has not asked for pain medication since giving this morning. Will continue to monitor pain levels.

## 2021-08-06 ENCOUNTER — ANESTHESIA EVENT (OUTPATIENT)
Dept: OPERATING ROOM | Age: 86
DRG: 418 | End: 2021-08-06
Payer: MEDICARE

## 2021-08-06 ENCOUNTER — ANESTHESIA (OUTPATIENT)
Dept: OPERATING ROOM | Age: 86
DRG: 418 | End: 2021-08-06
Payer: MEDICARE

## 2021-08-06 VITALS
SYSTOLIC BLOOD PRESSURE: 105 MMHG | OXYGEN SATURATION: 99 % | RESPIRATION RATE: 14 BRPM | TEMPERATURE: 97.2 F | DIASTOLIC BLOOD PRESSURE: 53 MMHG

## 2021-08-06 LAB
ABO/RH: NORMAL
ALBUMIN SERPL-MCNC: 2.5 G/DL (ref 3.4–5)
ANION GAP SERPL CALCULATED.3IONS-SCNC: 11 MMOL/L (ref 3–16)
ANTIBODY SCREEN: NORMAL
BASOPHILS ABSOLUTE: 0 K/UL (ref 0–0.2)
BASOPHILS RELATIVE PERCENT: 0.2 %
BUN BLDV-MCNC: 26 MG/DL (ref 7–20)
CALCIUM SERPL-MCNC: 8.2 MG/DL (ref 8.3–10.6)
CHLORIDE BLD-SCNC: 106 MMOL/L (ref 99–110)
CO2: 20 MMOL/L (ref 21–32)
CREAT SERPL-MCNC: 1.5 MG/DL (ref 0.6–1.2)
EOSINOPHILS ABSOLUTE: 0.1 K/UL (ref 0–0.6)
EOSINOPHILS RELATIVE PERCENT: 0.5 %
GFR AFRICAN AMERICAN: 40
GFR NON-AFRICAN AMERICAN: 33
GLUCOSE BLD-MCNC: 120 MG/DL (ref 70–99)
HCT VFR BLD CALC: 29.8 % (ref 36–48)
HEMOGLOBIN: 10.2 G/DL (ref 12–16)
INR BLD: 1.36 (ref 0.88–1.12)
LYMPHOCYTES ABSOLUTE: 0.5 K/UL (ref 1–5.1)
LYMPHOCYTES RELATIVE PERCENT: 4 %
MAGNESIUM: 1.7 MG/DL (ref 1.8–2.4)
MCH RBC QN AUTO: 32 PG (ref 26–34)
MCHC RBC AUTO-ENTMCNC: 34.3 G/DL (ref 31–36)
MCV RBC AUTO: 93.4 FL (ref 80–100)
MONOCYTES ABSOLUTE: 1.6 K/UL (ref 0–1.3)
MONOCYTES RELATIVE PERCENT: 14.2 %
NEUTROPHILS ABSOLUTE: 9.4 K/UL (ref 1.7–7.7)
NEUTROPHILS RELATIVE PERCENT: 81.1 %
PDW BLD-RTO: 14.9 % (ref 12.4–15.4)
PHOSPHORUS: 3.1 MG/DL (ref 2.5–4.9)
PLATELET # BLD: 324 K/UL (ref 135–450)
PMV BLD AUTO: 8.2 FL (ref 5–10.5)
POTASSIUM SERPL-SCNC: 4.3 MMOL/L (ref 3.5–5.1)
PROTHROMBIN TIME: 15.6 SEC (ref 9.9–12.7)
RBC # BLD: 3.19 M/UL (ref 4–5.2)
SODIUM BLD-SCNC: 137 MMOL/L (ref 136–145)
WBC # BLD: 11.6 K/UL (ref 4–11)

## 2021-08-06 PROCEDURE — 2580000003 HC RX 258: Performed by: SURGERY

## 2021-08-06 PROCEDURE — 88304 TISSUE EXAM BY PATHOLOGIST: CPT

## 2021-08-06 PROCEDURE — 7100000000 HC PACU RECOVERY - FIRST 15 MIN: Performed by: SURGERY

## 2021-08-06 PROCEDURE — 2580000003 HC RX 258: Performed by: STUDENT IN AN ORGANIZED HEALTH CARE EDUCATION/TRAINING PROGRAM

## 2021-08-06 PROCEDURE — 86901 BLOOD TYPING SEROLOGIC RH(D): CPT

## 2021-08-06 PROCEDURE — 6360000002 HC RX W HCPCS: Performed by: NURSE ANESTHETIST, CERTIFIED REGISTERED

## 2021-08-06 PROCEDURE — 6360000004 HC RX CONTRAST MEDICATION: Performed by: SURGERY

## 2021-08-06 PROCEDURE — 85025 COMPLETE CBC W/AUTO DIFF WBC: CPT

## 2021-08-06 PROCEDURE — 2720000010 HC SURG SUPPLY STERILE: Performed by: SURGERY

## 2021-08-06 PROCEDURE — 3600000014 HC SURGERY LEVEL 4 ADDTL 15MIN: Performed by: SURGERY

## 2021-08-06 PROCEDURE — 47562 LAPAROSCOPIC CHOLECYSTECTOMY: CPT | Performed by: SURGERY

## 2021-08-06 PROCEDURE — 2500000003 HC RX 250 WO HCPCS: Performed by: NURSE ANESTHETIST, CERTIFIED REGISTERED

## 2021-08-06 PROCEDURE — 3600000004 HC SURGERY LEVEL 4 BASE: Performed by: SURGERY

## 2021-08-06 PROCEDURE — 0FB44ZZ EXCISION OF GALLBLADDER, PERCUTANEOUS ENDOSCOPIC APPROACH: ICD-10-PCS | Performed by: SURGERY

## 2021-08-06 PROCEDURE — 6360000002 HC RX W HCPCS: Performed by: STUDENT IN AN ORGANIZED HEALTH CARE EDUCATION/TRAINING PROGRAM

## 2021-08-06 PROCEDURE — 86850 RBC ANTIBODY SCREEN: CPT

## 2021-08-06 PROCEDURE — 2700000000 HC OXYGEN THERAPY PER DAY

## 2021-08-06 PROCEDURE — C1758 CATHETER, URETERAL: HCPCS | Performed by: SURGERY

## 2021-08-06 PROCEDURE — 80069 RENAL FUNCTION PANEL: CPT

## 2021-08-06 PROCEDURE — 2500000003 HC RX 250 WO HCPCS: Performed by: STUDENT IN AN ORGANIZED HEALTH CARE EDUCATION/TRAINING PROGRAM

## 2021-08-06 PROCEDURE — 3700000000 HC ANESTHESIA ATTENDED CARE: Performed by: SURGERY

## 2021-08-06 PROCEDURE — 86900 BLOOD TYPING SEROLOGIC ABO: CPT

## 2021-08-06 PROCEDURE — 85610 PROTHROMBIN TIME: CPT

## 2021-08-06 PROCEDURE — 2500000003 HC RX 250 WO HCPCS: Performed by: SURGERY

## 2021-08-06 PROCEDURE — 6360000002 HC RX W HCPCS: Performed by: ANESTHESIOLOGY

## 2021-08-06 PROCEDURE — 36415 COLL VENOUS BLD VENIPUNCTURE: CPT

## 2021-08-06 PROCEDURE — 2709999900 HC NON-CHARGEABLE SUPPLY: Performed by: SURGERY

## 2021-08-06 PROCEDURE — 94150 VITAL CAPACITY TEST: CPT

## 2021-08-06 PROCEDURE — 83735 ASSAY OF MAGNESIUM: CPT

## 2021-08-06 PROCEDURE — 6360000002 HC RX W HCPCS: Performed by: SURGERY

## 2021-08-06 PROCEDURE — 7100000001 HC PACU RECOVERY - ADDTL 15 MIN: Performed by: SURGERY

## 2021-08-06 PROCEDURE — 3700000001 HC ADD 15 MINUTES (ANESTHESIA): Performed by: SURGERY

## 2021-08-06 PROCEDURE — 94761 N-INVAS EAR/PLS OXIMETRY MLT: CPT

## 2021-08-06 PROCEDURE — 1200000000 HC SEMI PRIVATE

## 2021-08-06 RX ORDER — DEXAMETHASONE SODIUM PHOSPHATE 4 MG/ML
INJECTION, SOLUTION INTRA-ARTICULAR; INTRALESIONAL; INTRAMUSCULAR; INTRAVENOUS; SOFT TISSUE PRN
Status: DISCONTINUED | OUTPATIENT
Start: 2021-08-06 | End: 2021-08-06 | Stop reason: SDUPTHER

## 2021-08-06 RX ORDER — SODIUM CHLORIDE, SODIUM LACTATE, POTASSIUM CHLORIDE, AND CALCIUM CHLORIDE .6; .31; .03; .02 G/100ML; G/100ML; G/100ML; G/100ML
750 INJECTION, SOLUTION INTRAVENOUS ONCE
Status: COMPLETED | OUTPATIENT
Start: 2021-08-06 | End: 2021-08-06

## 2021-08-06 RX ORDER — HYDROCODONE BITARTRATE AND ACETAMINOPHEN 5; 325 MG/1; MG/1
1 TABLET ORAL
Status: DISCONTINUED | OUTPATIENT
Start: 2021-08-06 | End: 2021-08-06 | Stop reason: HOSPADM

## 2021-08-06 RX ORDER — 0.9 % SODIUM CHLORIDE 0.9 %
500 INTRAVENOUS SOLUTION INTRAVENOUS
Status: DISCONTINUED | OUTPATIENT
Start: 2021-08-06 | End: 2021-08-06 | Stop reason: HOSPADM

## 2021-08-06 RX ORDER — ROCURONIUM BROMIDE 10 MG/ML
INJECTION, SOLUTION INTRAVENOUS PRN
Status: DISCONTINUED | OUTPATIENT
Start: 2021-08-06 | End: 2021-08-06 | Stop reason: SDUPTHER

## 2021-08-06 RX ORDER — MEPERIDINE HYDROCHLORIDE 25 MG/ML
12.5 INJECTION INTRAMUSCULAR; INTRAVENOUS; SUBCUTANEOUS EVERY 5 MIN PRN
Status: DISCONTINUED | OUTPATIENT
Start: 2021-08-06 | End: 2021-08-06 | Stop reason: HOSPADM

## 2021-08-06 RX ORDER — OXYCODONE HYDROCHLORIDE 5 MG/1
5 TABLET ORAL EVERY 6 HOURS PRN
Qty: 20 TABLET | Refills: 0 | Status: SHIPPED | OUTPATIENT
Start: 2021-08-06 | End: 2021-08-11

## 2021-08-06 RX ORDER — SODIUM CHLORIDE, SODIUM LACTATE, POTASSIUM CHLORIDE, AND CALCIUM CHLORIDE .6; .31; .03; .02 G/100ML; G/100ML; G/100ML; G/100ML
IRRIGANT IRRIGATION PRN
Status: DISCONTINUED | OUTPATIENT
Start: 2021-08-06 | End: 2021-08-06 | Stop reason: ALTCHOICE

## 2021-08-06 RX ORDER — DIPHENHYDRAMINE HYDROCHLORIDE 50 MG/ML
12.5 INJECTION INTRAMUSCULAR; INTRAVENOUS
Status: DISCONTINUED | OUTPATIENT
Start: 2021-08-06 | End: 2021-08-06 | Stop reason: HOSPADM

## 2021-08-06 RX ORDER — HYDRALAZINE HYDROCHLORIDE 20 MG/ML
5 INJECTION INTRAMUSCULAR; INTRAVENOUS EVERY 10 MIN PRN
Status: DISCONTINUED | OUTPATIENT
Start: 2021-08-06 | End: 2021-08-06 | Stop reason: HOSPADM

## 2021-08-06 RX ORDER — ONDANSETRON 2 MG/ML
4 INJECTION INTRAMUSCULAR; INTRAVENOUS
Status: COMPLETED | OUTPATIENT
Start: 2021-08-06 | End: 2021-08-06

## 2021-08-06 RX ORDER — FENTANYL CITRATE 50 UG/ML
INJECTION, SOLUTION INTRAMUSCULAR; INTRAVENOUS PRN
Status: DISCONTINUED | OUTPATIENT
Start: 2021-08-06 | End: 2021-08-06 | Stop reason: SDUPTHER

## 2021-08-06 RX ORDER — BUPIVACAINE HYDROCHLORIDE AND EPINEPHRINE 5; 5 MG/ML; UG/ML
INJECTION, SOLUTION EPIDURAL; INTRACAUDAL; PERINEURAL PRN
Status: DISCONTINUED | OUTPATIENT
Start: 2021-08-06 | End: 2021-08-06 | Stop reason: ALTCHOICE

## 2021-08-06 RX ORDER — PROPOFOL 10 MG/ML
INJECTION, EMULSION INTRAVENOUS PRN
Status: DISCONTINUED | OUTPATIENT
Start: 2021-08-06 | End: 2021-08-06 | Stop reason: SDUPTHER

## 2021-08-06 RX ORDER — SUCCINYLCHOLINE/SOD CL,ISO/PF 100 MG/5ML
SYRINGE (ML) INTRAVENOUS PRN
Status: DISCONTINUED | OUTPATIENT
Start: 2021-08-06 | End: 2021-08-06 | Stop reason: SDUPTHER

## 2021-08-06 RX ORDER — MAGNESIUM SULFATE IN WATER 40 MG/ML
2000 INJECTION, SOLUTION INTRAVENOUS ONCE
Status: COMPLETED | OUTPATIENT
Start: 2021-08-06 | End: 2021-08-06

## 2021-08-06 RX ORDER — PROCHLORPERAZINE EDISYLATE 5 MG/ML
5 INJECTION INTRAMUSCULAR; INTRAVENOUS
Status: DISCONTINUED | OUTPATIENT
Start: 2021-08-06 | End: 2021-08-06 | Stop reason: HOSPADM

## 2021-08-06 RX ORDER — MAGNESIUM HYDROXIDE 1200 MG/15ML
LIQUID ORAL CONTINUOUS PRN
Status: COMPLETED | OUTPATIENT
Start: 2021-08-06 | End: 2021-08-06

## 2021-08-06 RX ORDER — ONDANSETRON 2 MG/ML
INJECTION INTRAMUSCULAR; INTRAVENOUS PRN
Status: DISCONTINUED | OUTPATIENT
Start: 2021-08-06 | End: 2021-08-06 | Stop reason: SDUPTHER

## 2021-08-06 RX ORDER — DOCUSATE SODIUM 100 MG/1
100 CAPSULE, LIQUID FILLED ORAL 2 TIMES DAILY
Qty: 14 CAPSULE | Refills: 0 | Status: SHIPPED | OUTPATIENT
Start: 2021-08-06 | End: 2021-08-13

## 2021-08-06 RX ADMIN — Medication 10 ML: at 08:44

## 2021-08-06 RX ADMIN — CEFAZOLIN 2000 MG: 10 INJECTION, POWDER, FOR SOLUTION INTRAVENOUS at 14:32

## 2021-08-06 RX ADMIN — HYDROMORPHONE HYDROCHLORIDE 0.5 MG: 1 INJECTION, SOLUTION INTRAMUSCULAR; INTRAVENOUS; SUBCUTANEOUS at 12:07

## 2021-08-06 RX ADMIN — ENOXAPARIN SODIUM 30 MG: 40 INJECTION SUBCUTANEOUS at 08:43

## 2021-08-06 RX ADMIN — SUGAMMADEX 200 MG: 100 INJECTION, SOLUTION INTRAVENOUS at 16:03

## 2021-08-06 RX ADMIN — ONDANSETRON 4 MG: 2 INJECTION INTRAMUSCULAR; INTRAVENOUS at 14:51

## 2021-08-06 RX ADMIN — FENTANYL CITRATE 25 MCG: 50 INJECTION, SOLUTION INTRAMUSCULAR; INTRAVENOUS at 14:49

## 2021-08-06 RX ADMIN — HYDROMORPHONE HYDROCHLORIDE 0.25 MG: 1 INJECTION, SOLUTION INTRAMUSCULAR; INTRAVENOUS; SUBCUTANEOUS at 01:14

## 2021-08-06 RX ADMIN — SODIUM CHLORIDE, POTASSIUM CHLORIDE, SODIUM LACTATE AND CALCIUM CHLORIDE 750 ML: 600; 310; 30; 20 INJECTION, SOLUTION INTRAVENOUS at 08:43

## 2021-08-06 RX ADMIN — PROPOFOL 90 MG: 10 INJECTION, EMULSION INTRAVENOUS at 14:20

## 2021-08-06 RX ADMIN — METOPROLOL TARTRATE 2.5 MG: 5 INJECTION INTRAVENOUS at 10:33

## 2021-08-06 RX ADMIN — ONDANSETRON 4 MG: 2 INJECTION INTRAMUSCULAR; INTRAVENOUS at 16:58

## 2021-08-06 RX ADMIN — MAGNESIUM SULFATE HEPTAHYDRATE 2000 MG: 2 INJECTION, SOLUTION INTRAVENOUS at 08:43

## 2021-08-06 RX ADMIN — HYDROMORPHONE HYDROCHLORIDE 0.5 MG: 1 INJECTION, SOLUTION INTRAMUSCULAR; INTRAVENOUS; SUBCUTANEOUS at 22:58

## 2021-08-06 RX ADMIN — METOPROLOL TARTRATE 2.5 MG: 5 INJECTION INTRAVENOUS at 22:58

## 2021-08-06 RX ADMIN — DEXAMETHASONE SODIUM PHOSPHATE 4 MG: 4 INJECTION, SOLUTION INTRAMUSCULAR; INTRAVENOUS at 14:51

## 2021-08-06 RX ADMIN — HYDROMORPHONE HYDROCHLORIDE 0.5 MG: 1 INJECTION, SOLUTION INTRAMUSCULAR; INTRAVENOUS; SUBCUTANEOUS at 19:31

## 2021-08-06 RX ADMIN — Medication 60 MG: at 14:20

## 2021-08-06 RX ADMIN — FENTANYL CITRATE 25 MCG: 50 INJECTION, SOLUTION INTRAMUSCULAR; INTRAVENOUS at 15:14

## 2021-08-06 RX ADMIN — PIPERACILLIN AND TAZOBACTAM 3375 MG: 3; .375 INJECTION, POWDER, LYOPHILIZED, FOR SOLUTION INTRAVENOUS at 19:12

## 2021-08-06 RX ADMIN — HYDROMORPHONE HYDROCHLORIDE 0.25 MG: 1 INJECTION, SOLUTION INTRAMUSCULAR; INTRAVENOUS; SUBCUTANEOUS at 08:54

## 2021-08-06 RX ADMIN — FENTANYL CITRATE 50 MCG: 50 INJECTION, SOLUTION INTRAMUSCULAR; INTRAVENOUS at 14:15

## 2021-08-06 RX ADMIN — HYDROMORPHONE HYDROCHLORIDE 0.25 MG: 1 INJECTION, SOLUTION INTRAMUSCULAR; INTRAVENOUS; SUBCUTANEOUS at 05:46

## 2021-08-06 RX ADMIN — HYDROMORPHONE HYDROCHLORIDE 0.5 MG: 1 INJECTION, SOLUTION INTRAMUSCULAR; INTRAVENOUS; SUBCUTANEOUS at 16:54

## 2021-08-06 RX ADMIN — ROCURONIUM BROMIDE 40 MG: 10 INJECTION INTRAVENOUS at 14:36

## 2021-08-06 ASSESSMENT — PAIN DESCRIPTION - LOCATION
LOCATION: ABDOMEN

## 2021-08-06 ASSESSMENT — PAIN DESCRIPTION - PROGRESSION
CLINICAL_PROGRESSION: NOT CHANGED
CLINICAL_PROGRESSION: NOT CHANGED
CLINICAL_PROGRESSION: GRADUALLY WORSENING

## 2021-08-06 ASSESSMENT — PULMONARY FUNCTION TESTS
PIF_VALUE: 1
PIF_VALUE: 24
PIF_VALUE: 24
PIF_VALUE: 25
PIF_VALUE: 18
PIF_VALUE: 21
PIF_VALUE: 25
PIF_VALUE: 1
PIF_VALUE: 23
PIF_VALUE: 24
PIF_VALUE: 22
PIF_VALUE: 21
PIF_VALUE: 19
PIF_VALUE: 23
PIF_VALUE: 22
PIF_VALUE: 19
PIF_VALUE: 22
PIF_VALUE: 20
PIF_VALUE: 1
PIF_VALUE: 0
PIF_VALUE: 24
PIF_VALUE: 23
PIF_VALUE: 22
PIF_VALUE: 22
PIF_VALUE: 23
PIF_VALUE: 22
PIF_VALUE: 23
PIF_VALUE: 19
PIF_VALUE: 19
PIF_VALUE: 23
PIF_VALUE: 23
PIF_VALUE: 10
PIF_VALUE: 4
PIF_VALUE: 20
PIF_VALUE: 22
PIF_VALUE: 22
PIF_VALUE: 19
PIF_VALUE: 24
PIF_VALUE: 22
PIF_VALUE: 2
PIF_VALUE: 12
PIF_VALUE: 23
PIF_VALUE: 22
PIF_VALUE: 22
PIF_VALUE: 20
PIF_VALUE: 23
PIF_VALUE: 20
PIF_VALUE: 22
PIF_VALUE: 19
PIF_VALUE: 23
PIF_VALUE: 24
PIF_VALUE: 20
PIF_VALUE: 11
PIF_VALUE: 22
PIF_VALUE: 24
PIF_VALUE: 2
PIF_VALUE: 22
PIF_VALUE: 23
PIF_VALUE: 19
PIF_VALUE: 22
PIF_VALUE: 20
PIF_VALUE: 22
PIF_VALUE: 23
PIF_VALUE: 21
PIF_VALUE: 20
PIF_VALUE: 24
PIF_VALUE: 22
PIF_VALUE: 4
PIF_VALUE: 21
PIF_VALUE: 22
PIF_VALUE: 22
PIF_VALUE: 20
PIF_VALUE: 20
PIF_VALUE: 22
PIF_VALUE: 24
PIF_VALUE: 20
PIF_VALUE: 2
PIF_VALUE: 21
PIF_VALUE: 21
PIF_VALUE: 22
PIF_VALUE: 23
PIF_VALUE: 21
PIF_VALUE: 20
PIF_VALUE: 20
PIF_VALUE: 21
PIF_VALUE: 23
PIF_VALUE: 24
PIF_VALUE: 18
PIF_VALUE: 22
PIF_VALUE: 23
PIF_VALUE: 18
PIF_VALUE: 21
PIF_VALUE: 0
PIF_VALUE: 22
PIF_VALUE: 21
PIF_VALUE: 21
PIF_VALUE: 23
PIF_VALUE: 21
PIF_VALUE: 22
PIF_VALUE: 22
PIF_VALUE: 21
PIF_VALUE: 23
PIF_VALUE: 23
PIF_VALUE: 2
PIF_VALUE: 1
PIF_VALUE: 20
PIF_VALUE: 12
PIF_VALUE: 18
PIF_VALUE: 22
PIF_VALUE: 23
PIF_VALUE: 21
PIF_VALUE: 23
PIF_VALUE: 21

## 2021-08-06 ASSESSMENT — PAIN DESCRIPTION - PAIN TYPE
TYPE: ACUTE PAIN
TYPE: SURGICAL PAIN
TYPE: ACUTE PAIN

## 2021-08-06 ASSESSMENT — PAIN SCALES - GENERAL
PAINLEVEL_OUTOF10: 6
PAINLEVEL_OUTOF10: 2
PAINLEVEL_OUTOF10: 7
PAINLEVEL_OUTOF10: 7
PAINLEVEL_OUTOF10: 0
PAINLEVEL_OUTOF10: 6
PAINLEVEL_OUTOF10: 0
PAINLEVEL_OUTOF10: 0
PAINLEVEL_OUTOF10: 7
PAINLEVEL_OUTOF10: 4
PAINLEVEL_OUTOF10: 9

## 2021-08-06 ASSESSMENT — PAIN DESCRIPTION - DESCRIPTORS
DESCRIPTORS: ACHING
DESCRIPTORS: ACHING
DESCRIPTORS: ACHING;SORE
DESCRIPTORS: ACHING

## 2021-08-06 ASSESSMENT — PAIN DESCRIPTION - ORIENTATION
ORIENTATION: MID
ORIENTATION: MID
ORIENTATION: MID;RIGHT
ORIENTATION: MID

## 2021-08-06 ASSESSMENT — PAIN DESCRIPTION - FREQUENCY
FREQUENCY: CONTINUOUS
FREQUENCY: CONTINUOUS
FREQUENCY: INTERMITTENT
FREQUENCY: CONTINUOUS

## 2021-08-06 ASSESSMENT — PAIN DESCRIPTION - ONSET
ONSET: ON-GOING

## 2021-08-06 ASSESSMENT — LIFESTYLE VARIABLES: SMOKING_STATUS: 0

## 2021-08-06 NOTE — ANESTHESIA PRE PROCEDURE
Department of Anesthesiology  Preprocedure Note       Name:  Aliza Pablo   Age:  80 y.o.  :  1932                                          MRN:  [id number]         Date:  2021      Surgeon: Krish Hines):  Natalia Barragan MD    Procedure: Procedure(s):  LAPAROSCOPIC CHOLECYSTECTOMY  WITH IOC    Medications prior to admission:   Prior to Admission medications    Medication Sig Start Date End Date Taking?  Authorizing Provider   clopidogrel (PLAVIX) 75 MG tablet TAKE 1 TABLET BY MOUTH DAILY 19  Malinda Woody MD   lisinopril (PRINIVIL;ZESTRIL) 10 MG tablet Take 1 tablet by mouth daily 3/2/19   Knedall Joseph MD   aspirin 81 MG EC tablet Take 1 tablet by mouth daily 3/1/19   Kendall Joseph MD   atorvastatin (LIPITOR) 40 MG tablet Take 1 tablet by mouth nightly 19   Kendall Joseph MD   metoprolol tartrate (LOPRESSOR) 25 MG tablet Take 1 tablet by mouth 2 times daily 19   Kendall Joseph MD   amLODIPine (NORVASC) 10 MG tablet Take 1 tablet by mouth daily 3/1/19   Kendall Joseph MD   clopidogrel (PLAVIX) 75 MG tablet Take 1 tablet by mouth daily 3/1/19   Kendall Joseph MD       Current medications:    Current Facility-Administered Medications   Medication Dose Route Frequency Provider Last Rate Last Admin    meperidine (DEMEROL) injection 12.5 mg  12.5 mg Intravenous Q5 Min PRN Pushpa , DO        HYDROmorphone (DILAUDID) injection 0.25 mg  0.25 mg Intravenous Q5 Min PRN Pushpa , DO        HYDROmorphone (DILAUDID) injection 0.5 mg  0.5 mg Intravenous Q5 Min PRN Pushpa , DO        HYDROcodone-acetaminophen [facility]) 5-325 MG per tablet 1 tablet  1 tablet Oral Once PRN Pushpa , DO        ondansetron [facility]) injection 4 mg  4 mg Intravenous Once PRN Pushpa , DO        prochlorperazine (COMPAZINE) injection 5 mg  5 mg Intravenous Once PRN Pushpa , DO        0.9 % sodium chloride bolus  500 mL Intravenous Once PRN Pushpa , DO status: Never Smoker    Smokeless tobacco: Never Used   Substance Use Topics    Alcohol use: No                                Counseling given: Not Answered      Vital Signs (Current):   Vitals:    08/06/21 1107 08/06/21 1348 08/06/21 1352 08/06/21 1355   BP: 127/75 (!) 148/66 139/68 (!) 140/66   Pulse: 80 89 91 87   Resp: 16 21 20 22   Temp: 98.4 °F (36.9 °C) 96 °F (35.6 °C)     TempSrc: Oral Temporal     SpO2: 91% (!) 88% 92% 91%   Weight:       Height:                                                  BP Readings from Last 3 Encounters:   08/06/21 (!) 140/66   08/03/21 (!) 151/68   12/16/19 (!) 162/57       NPO Status: Time of last liquid consumption: 2300                        Time of last solid consumption: 2300                                                      BMI:   Wt Readings from Last 3 Encounters:   08/05/21 161 lb 12.8 oz (73.4 kg)   08/03/21 150 lb (68 kg)   12/16/19 154 lb (69.9 kg)     Body mass index is 25.34 kg/m². CBC:   Lab Results   Component Value Date    WBC 11.6 08/06/2021    RBC 3.19 08/06/2021    HGB 10.2 08/06/2021    HCT 29.8 08/06/2021    MCV 93.4 08/06/2021    RDW 14.9 08/06/2021     08/06/2021       CMP:   Lab Results   Component Value Date     08/06/2021    K 4.3 08/06/2021    K 3.8 08/05/2021     08/06/2021    CO2 20 08/06/2021    BUN 26 08/06/2021    CREATININE 1.5 08/06/2021    GFRAA 40 08/06/2021    AGRATIO 0.9 08/03/2021    LABGLOM 33 08/06/2021    GLUCOSE 120 08/06/2021    PROT 8.3 08/05/2021    CALCIUM 8.2 08/06/2021    BILITOT 1.1 08/05/2021    ALKPHOS 187 08/05/2021    AST 32 08/05/2021    ALT 29 08/05/2021       POC Tests: No results for input(s): POCGLU, POCNA, POCK, POCCL, POCBUN, POCHEMO, POCHCT in the last 72 hours.     Coags:   Lab Results   Component Value Date    PROTIME 15.6 08/06/2021    INR 1.36 08/06/2021    APTT 34.1 02/26/2019       HCG (If Applicable): No results found for: PREGTESTUR, PREGSERUM, HCG, HCGQUANT     ABGs: No results found for: PHART, PO2ART, TET9GCV, LIW8CCC, BEART, B0VJCSLM     Type & Screen (If Applicable):  No results found for: LABABO, LABRH    Drug/Infectious Status (If Applicable):  No results found for: HIV, HEPCAB    COVID-19 Screening (If Applicable): No results found for: COVID19        Anesthesia Evaluation  Patient summary reviewed and Nursing notes reviewed no history of anesthetic complications:   Airway: Mallampati: II  TM distance: <3 FB   Neck ROM: full  Comment: Recessed chin  Probable difficult intubation      Mouth opening: > = 3 FB Dental:    (+) partials and upper dentures      Pulmonary: breath sounds clear to auscultation      (-) not a current smoker (never)                           Cardiovascular:  Exercise tolerance: good (>4 METS),   (+) hypertension: moderate,     (-) past MI    NYHA Classification: II  ECG reviewed  Rhythm: regular  Rate: normal  Echocardiogram reviewed         Beta Blocker:  Dose within 24 Hrs      ROS comment: Echo:  Preserved ef   pulm htn  35 mm       Neuro/Psych:   (+) CVA (left arm weakness  ): residual symptoms,             GI/Hepatic/Renal:        (-) GERD       Endo/Other: Negative Endo/Other ROS                    Abdominal:   (+) scaphoid          Vascular: Other Findings:             Anesthesia Plan      general     ASA 4 - emergent       Induction: intravenous. MIPS: Postoperative opioids intended and Prophylactic antiemetics administered. Anesthetic plan and risks discussed with patient. Plan discussed with CRNA.     Attending anesthesiologist reviewed and agrees with Preprocedure content              Vimal Jiménez DO   8/6/2021

## 2021-08-06 NOTE — PROGRESS NOTES
[facility]  Cardiology Inpatient Consult Service  Daily Progress Note        Admit Date:  8/5/2021    Referring Physician: Federico Scott MD    Reason for Consultation/Chief Complaint: Preoperative risk stratification, laparoscopic cholecystomy    Subjective: Interval history:  No acute events overnight. Patient is sitting in the bed comfortably. She denies chest pain, shortness of breath, palpitation. Patient still complains of an upper quadrant abdominal pain. Medications:   sodium chloride flush  5-40 mL Intravenous 2 times per day    enoxaparin  30 mg Subcutaneous Daily    metoprolol  2.5 mg Intravenous Q6H    ceFAZolin  2,000 mg Intravenous On Call to OR       IV drips:   sodium chloride      lactated ringers 100 mL/hr at 08/05/21 0954       PRN:  sodium chloride flush, sodium chloride, ondansetron **OR** ondansetron, HYDROmorphone **OR** HYDROmorphone      Objective:     Vitals:    08/06/21 0319 08/06/21 0719 08/06/21 1000 08/06/21 1107   BP: 106/68 122/72 111/63 127/75   Pulse:  84 81 80   Resp:  16 16 16   Temp:  98.9 °F (37.2 °C)  98.4 °F (36.9 °C)   TempSrc:  Oral  Oral   SpO2:  92% 92% 91%   Weight:       Height:           Intake/Output Summary (Last 24 hours) at 8/6/2021 1322  Last data filed at 8/6/2021 1223  Gross per 24 hour   Intake 825 ml   Output 1000 ml   Net -175 ml     I/O last 3 completed shifts: In: 700 [P.O.:200;  I.V.:500]  Out: 900 [Urine:900]  Wt Readings from Last 3 Encounters:   08/05/21 161 lb 12.8 oz (73.4 kg)   08/03/21 150 lb (68 kg)   12/16/19 154 lb (69.9 kg)       Admit Wt: Weight: 161 lb 12.8 oz (73.4 kg)   Todays Wt: Weight: 161 lb 12.8 oz (73.4 kg)    TELEMETRY: Sinus     Physical Exam:     General:  Awake, alert, NAD  Skin:  Warm and dry  Neck: No JVP  Chest:  Clear to auscultation, respiration normal  Cardiovascular:  RRR S1S2  Abdomen:  Soft, tender to palpation  Extremities: No edema     Objective    Labs:   Recent Labs 08/05/21 0229 08/06/21  0501    137   K 3.8 4.3   BUN 20 26*   CREATININE 1.3* 1.5*    106   CO2 21 20*   GLUCOSE 172* 120*   CALCIUM 9.1 8.2*   MG  --  1.70*     Recent Labs     08/05/21 0229 08/05/21 0229 08/06/21  0501   WBC 3.8*  --  11.6*   HGB 14.5  --  10.2*   HCT 42.1  --  29.8*   *  --  324   MCV 93.8   < > 93.4    < > = values in this interval not displayed. No results for input(s): CHOLTOT, TRIG, HDL in the last 72 hours. Invalid input(s): LIPIDCOMM, CHOLHDL, VLDCHOL, LDL  Recent Labs     08/06/21  0501   INR 1.36*     Recent Labs     08/05/21 0229 08/05/21  0651   TROPONINI <0.01 <0.01     No results for input(s): BNP in the last 72 hours. No results for input(s): NTPROBNP in the last 72 hours. No results for input(s): TSH in the last 72 hours. Imaging:   I personally reviewed imaging studies including CXR, Stress test, TTE/REMEDIOS. Assessment & Plan:     Preoperative cardiac clearance  Denies chest pain, shortness of breath. Negative nuclear study (2019)  - Latest EKG (08/05): Sinus rhythm with Premature atrial complexes  - Echo (08/05): EF: 55-60%, Normal LV size, Systolic & diastolic function. PASP: 35 mmHg. No evidence of significant valvular stenosis or  regurgitation present  - Given the  EKG & Echo, absence of CP, Patient has acceptable risk for cholecystomy from cardiology standpoint  - Laparoscopic cholecystectomy today      This patient was staffed with the attending physician, Dr. Diana Bassett MD.    Jorge Lemus you for allowing to us to participate in the care of Kylah Wilkerson. Please call our service with questions.     Gissell Stuart MS-4  Belinda Russell MD  PGY-2    8/6/2021 1:22 PM

## 2021-08-06 NOTE — PROGRESS NOTES
Pt alert and oriented. VSS. Pt reporting abdominal pain that is being controlled with PRN Dilaudid, see MAR. Pt NPO at midnight for OR in AM. Pt voiding freely. Pt has IVF infusing per orders. Pt resting comfortably in bed. Pt has call light within reach, bed in lowest position with wheels locked, 2/4 side rails up, and bed alarm on. Will continue to monitor.

## 2021-08-06 NOTE — PROGRESS NOTES
Pt has been NPO since midnight. States she is ready to go to surgery. Unsure of timing at this point. Will likely be this afternoon.      Huma Slater CNP  8/6/2021  6:18 AM  mitul

## 2021-08-06 NOTE — PLAN OF CARE
Problem: Pain:  Goal: Pain level will decrease  Description: Pain level will decrease  Outcome: Ongoing   Pt has reported abdominal pain that is being controlled with ordered Dilaudid, see MAR.

## 2021-08-06 NOTE — BRIEF OP NOTE
Brief Postoperative Note      Patient: Jimbo Phelps  YOB: 1932  MRN: [id number]    Date of Procedure: 8/6/2021    Pre-Op Diagnosis: symptomatic cholelithiasis    Post-Op Diagnosis: acute on chronic cholecystitis       Procedure:  Subtotal cholecystectomy     Surgeon(s):  Federico Scott MD    Assistant:  Resident: Sandra Gonzalez MD    Anesthesia: General    Estimated Blood Loss (mL):  50 cc    Complications: None    Specimens:   ID Type Source Tests Collected by Time Destination   A : GALLBLADDER AND CONTENTS Tissue Gallbladder SURGICAL PATHOLOGY Federico Scott MD 8/6/2021 1454          Drains:   Closed/Suction Drain Anterior Abdomen Bulb 15 [place] (Active)       NG/OG/NJ/NE Tube Orogastric Center mouth (Active)       Findings:   Chronic cholecystitis, with significant rind. Pus filled gallbladder. Large stone in gallbladder.  Drain left in gallbladder fossa      Electronically signed by Sandra Gonzalez MD on 8/6/2021 at 4:00 PM

## 2021-08-06 NOTE — FLOWSHEET NOTE
Patient arrived from OR to PACU#13 post [facility] CHOLANGIOGRAM of Dr. Prashant Astorga. Patient is placed on cardiac monitor. Report is given per CRNA. Per report, patient was stable during procedure. On arrival, patient is arousable, no signs of pain, serosanguineous fluid leaking around ZAYRA insertion site noted, Dr. Micah Cabot notified.

## 2021-08-06 NOTE — FLOWSHEET NOTE
PACU Transfer Note    Vitals:    08/06/21 1745   BP: (!) 118/56   Pulse: 84   Resp: 14   Temp: 98.1 °F (36.7 °C)   SpO2: 94%     BP is within 20% of admission. In: 625 [I.V.:625]  Out: 105 [Drains:55]    Pain assessment:  present - adequately treated and receiving treatment  Pain Level: 0    Report given to Receiving unit RN. Patient is transported back to her room per PACU transport.     8/6/2021 5:55 PM

## 2021-08-06 NOTE — ANESTHESIA POSTPROCEDURE EVALUATION
Department of Anesthesiology  Postprocedure Note    Patient: Jose Luis Samuel  MRN: [id number]  YOB: 1932  Date of evaluation: 8/6/2021  Time:  6:24 PM     Procedure Summary     Date: 08/06/21 Room / Location: [address] [place] [address] Route [facility] 01 / [facility]    Anesthesia Start: 2110 Anesthesia Stop: 3595    Procedure: LAPAROSCOPIC CHOLECYSTECTOMY  WITH INTRAOPERATIVE CHOLANGIOGRAM (N/A Abdomen) Diagnosis: (symptomatic cholelithiasis)    Surgeons: Javdi Vega MD Responsible Provider: Hemalatha Rodriguez DO    Anesthesia Type: general ASA Status: 4 - Emergent          Anesthesia Type: general    Taty Phase I: Taty Score: 9    Taty Phase II:      Last vitals: Reviewed and per EMR flowsheets.        Anesthesia Post Evaluation    Patient location during evaluation: bedside  Patient participation: complete - patient participated  Level of consciousness: awake and alert  Airway patency: patent  Nausea & Vomiting: no nausea and no vomiting  Complications: no  Respiratory status: acceptable  Hydration status: stable

## 2021-08-06 NOTE — OP NOTE
Operative Note        Patient: Lele Boswell  YOB: 1932  MRN: [id number]    Date of Procedure: 8/6/2021    Pre-Op Diagnosis: symptomatic cholelithiasis    Post-Op Diagnosis: acute on chronic cholecystitis, empyema of the gallbladder        Procedure:  Subtotal cholecystectomy, laparoscopic     Surgeon(s):  Aden Mata MD    Assistant:  Resident: Franchesca Pham MD    Anesthesia: General    Estimated Blood Loss (mL):  50 cc    Complications: None    Specimens:   ID Type Source Tests Collected by Time Destination   A : GALLBLADDER AND CONTENTS Tissue Gallbladder SURGICAL PATHOLOGY Aden Mata MD 8/6/2021 1454          Drains:   Closed/Suction Drain Anterior Abdomen Bulb 15 [place] (Active)       NG/OG/NJ/NE Tube Orogastric Center mouth (Active)       Findings:   Chronic cholecystitis, with significant rind. Pus filled gallbladder. Large stone in gallbladder. Drain left in gallbladder fossa      Indications: abdominal pain, large gallstone and inflamed gallbladder seen on CT    OPERATIVE NOTE    After informed consent was obtained the patient was taken to the operating room. The patient was placed in the supine position. General anesthesia was given. The patient was prepped and draped in the usual sterile fashion. We began by calling time out to confirm the key components of the operation. We then placed a 5mm port just medial to Zambrano's point using the Optiview. Abdomen was insufflated. Additional ports were placed. Our 5 mm near Zambrano's point was upsized to a 12mm port. We then identified the gallbladder. There was a giant stone and severe inflammation. We aspirated the gallbladder with a laparoscopic needle and syringe and removed some kalyani pus but aspiration was difficult due to the large stone occupying much of the lumen. Intra-abdominal fat and the duodenum were plastered to the gallbladder wall. We gently teased these away and inspected.  We did not see any injury to the duodenum or serosal tear. There was no indication of a fistula. The gallbladder was retracted and we attempted to establish the critical view. We chipped away at the thickened gallbladder wall but could not see anything resembling normal anatomy as it dived down into an area of severe inflammation. Due to the inflammation the rind was very hemorrhagic though this was controlled with cautery. At this point given the severe inflammation we decided to perform a subtotal cholecystectomy to avoid any injury to the bile ducts or duodenum. Given the findings I did not think this cholecystectomy would be any more feasible with an open approach so we continued laparoscopically. We used the hook cautery to incise across the lower anterior gallbladder wall. We then carried this incision around laterally and then to the posterior surface. Gallbladder was taken off the liver bed using the hook cautery. Gallbladder and stone fragments were placed in a bag and removed. Irrigation was used to wash the area. There was a small cuff of posterior wall of the gallbladder that was cauterized to destroy the mucosa. We could see the gallbladder neck down to the cystic duct but given the severe inflammation we did not think attempt at closure would be successful and any bleeding or injury in the area would not be easy to treat. Given our dissection plane was ultimately far from the [place] we did not perform a cholangiogram. Good hemostasis was seen. We inspected the duodenum again and it appeared healthy without injury. We then surveyed the abdomen with the camera and saw no other pathology. 15 Fr channel drain was placed into the gallbladder fossa. Ports were removed. Fascial defect near Zambrano's point was closed with 0 vicryl with no defect at conclusion of closure. Skin was irrigated and closed with 4-0 monocryl. Dermabond applied. Dr. Johana Bui was present and scrubbed throughout the operation.     Deanna Floyd M.D.  8/6/21   4:27 PM

## 2021-08-06 NOTE — CARE COORDINATION
Cm following, pt to OR for cisco villegase today. Plans to DC home with support of son. Will need PT OT evals post op for DC recs and DME recs.  Electronically signed by Jose M Young RN on 8/6/2021 at 5:28 PM  [phone]

## 2021-08-06 NOTE — FLOWSHEET NOTE
Pt arrived to PACU #18 for preop. Vs as charted. Dr Luc Lopez notified pt here for preop. O2 saturation 88% on RA.  Placed pt on 2  L NC.

## 2021-08-07 PROBLEM — Z01.818 PRE-OP EVALUATION: Status: ACTIVE | Noted: 2021-08-07

## 2021-08-07 LAB
ALBUMIN SERPL-MCNC: 2.5 G/DL (ref 3.4–5)
ALP BLD-CCNC: 157 U/L (ref 40–129)
ALT SERPL-CCNC: 18 U/L (ref 10–40)
ANION GAP SERPL CALCULATED.3IONS-SCNC: 10 MMOL/L (ref 3–16)
AST SERPL-CCNC: 27 U/L (ref 15–37)
BASOPHILS ABSOLUTE: 0 K/UL (ref 0–0.2)
BASOPHILS RELATIVE PERCENT: 0 %
BILIRUB SERPL-MCNC: 0.6 MG/DL (ref 0–1)
BILIRUBIN DIRECT: <0.2 MG/DL (ref 0–0.3)
BILIRUBIN, INDIRECT: ABNORMAL MG/DL (ref 0–1)
BUN BLDV-MCNC: 24 MG/DL (ref 7–20)
CALCIUM SERPL-MCNC: 8.5 MG/DL (ref 8.3–10.6)
CHLORIDE BLD-SCNC: 105 MMOL/L (ref 99–110)
CO2: 22 MMOL/L (ref 21–32)
CREAT SERPL-MCNC: 1.1 MG/DL (ref 0.6–1.2)
EOSINOPHILS ABSOLUTE: 0 K/UL (ref 0–0.6)
EOSINOPHILS RELATIVE PERCENT: 0 %
GFR AFRICAN AMERICAN: 57
GFR NON-AFRICAN AMERICAN: 47
GLUCOSE BLD-MCNC: 166 MG/DL (ref 70–99)
HCT VFR BLD CALC: 33 % (ref 36–48)
HEMOGLOBIN: 11.4 G/DL (ref 12–16)
LYMPHOCYTES ABSOLUTE: 0.3 K/UL (ref 1–5.1)
LYMPHOCYTES RELATIVE PERCENT: 1.8 %
MAGNESIUM: 2.2 MG/DL (ref 1.8–2.4)
MCH RBC QN AUTO: 31.7 PG (ref 26–34)
MCHC RBC AUTO-ENTMCNC: 34.5 G/DL (ref 31–36)
MCV RBC AUTO: 91.8 FL (ref 80–100)
MONOCYTES ABSOLUTE: 1.2 K/UL (ref 0–1.3)
MONOCYTES RELATIVE PERCENT: 8.3 %
NEUTROPHILS ABSOLUTE: 12.7 K/UL (ref 1.7–7.7)
NEUTROPHILS RELATIVE PERCENT: 89.9 %
PDW BLD-RTO: 15.1 % (ref 12.4–15.4)
PHOSPHORUS: 3.4 MG/DL (ref 2.5–4.9)
PLATELET # BLD: 421 K/UL (ref 135–450)
PMV BLD AUTO: 8 FL (ref 5–10.5)
POTASSIUM SERPL-SCNC: 4.5 MMOL/L (ref 3.5–5.1)
RBC # BLD: 3.6 M/UL (ref 4–5.2)
SODIUM BLD-SCNC: 137 MMOL/L (ref 136–145)
TOTAL PROTEIN: 6 G/DL (ref 6.4–8.2)
WBC # BLD: 14.1 K/UL (ref 4–11)

## 2021-08-07 PROCEDURE — 80076 HEPATIC FUNCTION PANEL: CPT

## 2021-08-07 PROCEDURE — 1200000000 HC SEMI PRIVATE

## 2021-08-07 PROCEDURE — 85025 COMPLETE CBC W/AUTO DIFF WBC: CPT

## 2021-08-07 PROCEDURE — 94761 N-INVAS EAR/PLS OXIMETRY MLT: CPT

## 2021-08-07 PROCEDURE — 94150 VITAL CAPACITY TEST: CPT

## 2021-08-07 PROCEDURE — 2580000003 HC RX 258: Performed by: STUDENT IN AN ORGANIZED HEALTH CARE EDUCATION/TRAINING PROGRAM

## 2021-08-07 PROCEDURE — 2500000003 HC RX 250 WO HCPCS: Performed by: STUDENT IN AN ORGANIZED HEALTH CARE EDUCATION/TRAINING PROGRAM

## 2021-08-07 PROCEDURE — 2700000000 HC OXYGEN THERAPY PER DAY

## 2021-08-07 PROCEDURE — 80069 RENAL FUNCTION PANEL: CPT

## 2021-08-07 PROCEDURE — 94669 MECHANICAL CHEST WALL OSCILL: CPT

## 2021-08-07 PROCEDURE — 36415 COLL VENOUS BLD VENIPUNCTURE: CPT

## 2021-08-07 PROCEDURE — 6360000002 HC RX W HCPCS: Performed by: STUDENT IN AN ORGANIZED HEALTH CARE EDUCATION/TRAINING PROGRAM

## 2021-08-07 PROCEDURE — 83735 ASSAY OF MAGNESIUM: CPT

## 2021-08-07 PROCEDURE — 99232 SBSQ HOSP IP/OBS MODERATE 35: CPT | Performed by: INTERNAL MEDICINE

## 2021-08-07 RX ADMIN — SODIUM CHLORIDE, POTASSIUM CHLORIDE, SODIUM LACTATE AND CALCIUM CHLORIDE: 600; 310; 30; 20 INJECTION, SOLUTION INTRAVENOUS at 21:51

## 2021-08-07 RX ADMIN — PIPERACILLIN AND TAZOBACTAM 3375 MG: 3; .375 INJECTION, POWDER, LYOPHILIZED, FOR SOLUTION INTRAVENOUS at 18:28

## 2021-08-07 RX ADMIN — PIPERACILLIN AND TAZOBACTAM 3375 MG: 3; .375 INJECTION, POWDER, LYOPHILIZED, FOR SOLUTION INTRAVENOUS at 03:07

## 2021-08-07 RX ADMIN — PIPERACILLIN AND TAZOBACTAM 3375 MG: 3; .375 INJECTION, POWDER, LYOPHILIZED, FOR SOLUTION INTRAVENOUS at 10:52

## 2021-08-07 RX ADMIN — SODIUM CHLORIDE, POTASSIUM CHLORIDE, SODIUM LACTATE AND CALCIUM CHLORIDE: 600; 310; 30; 20 INJECTION, SOLUTION INTRAVENOUS at 03:44

## 2021-08-07 RX ADMIN — HYDROMORPHONE HYDROCHLORIDE 0.5 MG: 1 INJECTION, SOLUTION INTRAMUSCULAR; INTRAVENOUS; SUBCUTANEOUS at 21:25

## 2021-08-07 RX ADMIN — Medication 10 ML: at 08:06

## 2021-08-07 RX ADMIN — HYDROMORPHONE HYDROCHLORIDE 0.5 MG: 1 INJECTION, SOLUTION INTRAMUSCULAR; INTRAVENOUS; SUBCUTANEOUS at 03:44

## 2021-08-07 RX ADMIN — METOPROLOL TARTRATE 2.5 MG: 5 INJECTION INTRAVENOUS at 10:53

## 2021-08-07 RX ADMIN — SODIUM CHLORIDE, POTASSIUM CHLORIDE, SODIUM LACTATE AND CALCIUM CHLORIDE: 600; 310; 30; 20 INJECTION, SOLUTION INTRAVENOUS at 13:35

## 2021-08-07 RX ADMIN — METOPROLOL TARTRATE 2.5 MG: 5 INJECTION INTRAVENOUS at 22:57

## 2021-08-07 RX ADMIN — HYDROMORPHONE HYDROCHLORIDE 0.25 MG: 1 INJECTION, SOLUTION INTRAMUSCULAR; INTRAVENOUS; SUBCUTANEOUS at 10:52

## 2021-08-07 RX ADMIN — HYDROMORPHONE HYDROCHLORIDE 0.5 MG: 1 INJECTION, SOLUTION INTRAMUSCULAR; INTRAVENOUS; SUBCUTANEOUS at 14:26

## 2021-08-07 ASSESSMENT — PAIN DESCRIPTION - DESCRIPTORS
DESCRIPTORS: ACHING;SORE
DESCRIPTORS: ACHING;SORE

## 2021-08-07 ASSESSMENT — PAIN SCALES - GENERAL
PAINLEVEL_OUTOF10: 8
PAINLEVEL_OUTOF10: 0
PAINLEVEL_OUTOF10: 8
PAINLEVEL_OUTOF10: 8
PAINLEVEL_OUTOF10: 6
PAINLEVEL_OUTOF10: 0

## 2021-08-07 ASSESSMENT — PAIN - FUNCTIONAL ASSESSMENT
PAIN_FUNCTIONAL_ASSESSMENT: PREVENTS OR INTERFERES SOME ACTIVE ACTIVITIES AND ADLS
PAIN_FUNCTIONAL_ASSESSMENT: PREVENTS OR INTERFERES SOME ACTIVE ACTIVITIES AND ADLS

## 2021-08-07 ASSESSMENT — PAIN DESCRIPTION - LOCATION
LOCATION: ABDOMEN
LOCATION: ABDOMEN

## 2021-08-07 ASSESSMENT — PAIN DESCRIPTION - FREQUENCY
FREQUENCY: INTERMITTENT
FREQUENCY: INTERMITTENT

## 2021-08-07 ASSESSMENT — PAIN DESCRIPTION - ONSET
ONSET: ON-GOING
ONSET: ON-GOING

## 2021-08-07 ASSESSMENT — PAIN DESCRIPTION - PROGRESSION
CLINICAL_PROGRESSION: NOT CHANGED
CLINICAL_PROGRESSION: NOT CHANGED

## 2021-08-07 ASSESSMENT — PAIN DESCRIPTION - PAIN TYPE
TYPE: ACUTE PAIN;SURGICAL PAIN
TYPE: SURGICAL PAIN

## 2021-08-07 ASSESSMENT — PAIN DESCRIPTION - ORIENTATION
ORIENTATION: MID;RIGHT
ORIENTATION: RIGHT

## 2021-08-07 ASSESSMENT — PAIN SCALES - WONG BAKER: WONGBAKER_NUMERICALRESPONSE: 8

## 2021-08-07 NOTE — PROGRESS NOTES
[facility] Daily Progress Note      Admit Date:  8/5/2021    Subjective:  Ms. Leodan Rossi was seen and examined. Follow up pre op/Shabnam. Had good night. Denies cp/sob.  abd feeling ok. Objective:   /64   Pulse 94   Temp 98.3 °F (36.8 °C) (Oral)   Resp 16   Ht 5' 7\" (1.702 m)   Wt 161 lb 12.8 oz (73.4 kg)   SpO2 94%   BMI 25.34 kg/m²     Intake/Output Summary (Last 24 hours) at 8/7/2021 0645  Last data filed at 8/7/2021 0344  Gross per 24 hour   Intake 1411 ml   Output 735 ml   Net 676 ml          Physical Exam:  General:  Awake, alert, NAD  Skin:  Warm and dry  Neck:  JVP difficult  Chest:  Clear to auscultation, respiration normal  Cardiovascular:  RRR S1S2  Abdomen:  Soft quiet  Extremities:  no edema    Medications:    piperacillin-tazobactam  3,375 mg Intravenous Q8H    sodium chloride flush  5-40 mL Intravenous 2 times per day    metoprolol  2.5 mg Intravenous Q6H      sodium chloride      lactated ringers 100 mL/hr at 08/07/21 0344     sodium chloride flush, sodium chloride, ondansetron **OR** ondansetron, HYDROmorphone **OR** HYDROmorphone    Lab Data:  CBC:   Recent Labs     08/05/21 0229 08/06/21  0501   WBC 3.8* 11.6*   HGB 14.5 10.2*   HCT 42.1 29.8*   MCV 93.8 93.4   * 324     BMP:   Recent Labs     08/05/21 0229 08/06/21  0501    137   K 3.8 4.3    106   CO2 21 20*   PHOS  --  3.1   BUN 20 26*   CREATININE 1.3* 1.5*     LIVER PROFILE:   Recent Labs     08/05/21 0229   AST 32   ALT 29   LIPASE 21.0   BILIDIR 0.4*   BILITOT 1.1*   ALKPHOS 187*     PT/INR:   Recent Labs     08/06/21  0501   PROTIME 15.6*   INR 1.36*     APTT: No results for input(s): APTT in the last 72 hours. BNP:  No results for input(s): BNP in the last 72 hours.   IMAGING:     Assessment:  Patient Active Problem List    Diagnosis Date Noted    Acute calculous cholecystitis 08/05/2021    Carotid stenosis 02/28/2019    Cerebrovascular accident (CVA) of right thalamus ([facility] [address] 02/27/2019  Abnormal gait     Hypertensive urgency 02/26/2019       Plan:  1. Had good night. No chest pain. No sob. Tolerated surgery well.  abd feeling reasonable.        Core Measures:  · Discharge instructions:   · LVEF documented:   · ACEI for LV dysfunction:   · Smoking Cessation:    Neda Ahumada, MD, MD 8/7/2021 6:45 AM

## 2021-08-07 NOTE — PROGRESS NOTES
Patient alert and oriented x4. VSS. Surgical sites CDI with no surrounding redness or drainage noted. Hypoactive bowel sounds noted. ZAYRA drain on R flank with moderate brown bilious output. Patient continues to c/o surgical site pain, prn pain medication given with benefit. Voiding with no issues. Tolerating clear liquids with no c/o nausea throughout shift. Patient denies any other needs at this time. Bed is in the lowest position, call light and bedside table within reach. Patient bed alarm is on. Will continue to monitor for changes in patient status.      Electronically signed by Edy Vidales RN on 8/7/2021 at 12:31 PM

## 2021-08-07 NOTE — PLAN OF CARE
Problem: Falls - Risk of:  Goal: Will remain free from falls  Description: Will remain free from falls  Note: Patient has remained free of all falls and injuries throughout shift. Bed is locked in lowest position, call light within reach and patient calls out appropriately. Caregiver/ family at bedside throughout shift. Will continue to monitor for further needs. Problem: Pain:  Goal: Pain level will decrease  Description: Pain level will decrease  Note: Patient continues to complain of surgical site pain in abd rated about 6/10. Prn pain medication given with benefit. Will continue to monitor for further pain needs or changes.

## 2021-08-07 NOTE — PROGRESS NOTES
Bariatric Surgery   Daily Progress Note  Patient: Michael Michelle      CC: Symptomatic cholelithiasis    SUBJECTIVE:   Patient rested well overnight. Did well post-operatively. Pain is controlled. Afebrile, HDS.     ROS:   A 14 point review of systems was conducted, significant findings as noted above. All other systems negative.     OBJECTIVE:    PHYSICAL EXAM:    Vitals:    08/06/21 2244 08/06/21 2336 08/07/21 0315 08/07/21 0513   BP: 136/67  117/78 108/64   Pulse: 123 106 100 94   Resp: 15  16    Temp: 97.6 °F (36.4 °C)  98.3 °F (36.8 °C)    TempSrc: Oral  Oral    SpO2: 93%  94%    Weight:       Height:           General appearance: alert, no acute distress, grooming appropriate  Eyes: No scleral icterus, EOM grossly intact  Neck: trachea midline, no JVD, no lymphadenopathy, neck supple  Chest/Lungs: Equal excursion bilaterally, normal effort with no accessory muscle use on 2L NC  Cardiovascular: RRR, brisk capillary refill  Abdomen: Soft, appropriately-tender, incisions c/d/i and well approximated with Dermabond ZAYRA drain in place with bilious output  Skin: warm and dry, no rashes  Extremities: no edema, no cyanosis  Neuro: A&Ox3, no focal deficits, sensation intact    LABS:   Recent Labs     08/05/21 0229 08/06/21  0501   WBC 3.8* 11.6*   HGB 14.5 10.2*   HCT 42.1 29.8*   MCV 93.8 93.4   * 324        Recent Labs     08/05/21 0229 08/06/21  0501    137   K 3.8 4.3    106   CO2 21 20*   PHOS  --  3.1   BUN 20 26*   CREATININE 1.3* 1.5*        Recent Labs     08/05/21 0229   AST 32   ALT 29   BILIDIR 0.4*   BILITOT 1.1*   ALKPHOS 187*        Recent Labs     08/05/21 0229   LIPASE 21.0        Recent Labs     08/05/21 0229 08/06/21  0501   PROT 8.3*  --    INR  --  1.36*        Recent Labs     08/05/21 0229 08/05/21  0651   TROPONINI <0.01 <0.01         ASSESSMENT & PLAN:   This is a 80 y.o. female with a diagnosis of symptomatic cholecystectomy s/p laparoscopic subtotal cholecystectomy (8/6) POD1.    - Continue CLD, will advance as tolerates today.    - Continue ZAYRA drains to bulb   - F/u labs  - Continue Zosyn  - PT/OT for patient  - OOB as tolerated, ambulate  - Transition to PO meds     Eva Kearns DO  PGY1, General Surgery  08/07/21  6:04 AM  [phone]

## 2021-08-08 LAB
ALBUMIN SERPL-MCNC: 2.4 G/DL (ref 3.4–5)
ALBUMIN SERPL-MCNC: 2.5 G/DL (ref 3.4–5)
ALBUMIN SERPL-MCNC: 2.5 G/DL (ref 3.4–5)
ALP BLD-CCNC: 141 U/L (ref 40–129)
ALT SERPL-CCNC: 12 U/L (ref 10–40)
ANION GAP SERPL CALCULATED.3IONS-SCNC: 10 MMOL/L (ref 3–16)
ANION GAP SERPL CALCULATED.3IONS-SCNC: 13 MMOL/L (ref 3–16)
AST SERPL-CCNC: 19 U/L (ref 15–37)
BASOPHILS ABSOLUTE: 0 K/UL (ref 0–0.2)
BASOPHILS RELATIVE PERCENT: 0.1 %
BILIRUB SERPL-MCNC: 0.4 MG/DL (ref 0–1)
BILIRUBIN DIRECT: <0.2 MG/DL (ref 0–0.3)
BILIRUBIN, INDIRECT: ABNORMAL MG/DL (ref 0–1)
BUN BLDV-MCNC: 19 MG/DL (ref 7–20)
BUN BLDV-MCNC: 22 MG/DL (ref 7–20)
CALCIUM SERPL-MCNC: 8.1 MG/DL (ref 8.3–10.6)
CALCIUM SERPL-MCNC: 8.1 MG/DL (ref 8.3–10.6)
CHLORIDE BLD-SCNC: 104 MMOL/L (ref 99–110)
CHLORIDE BLD-SCNC: 105 MMOL/L (ref 99–110)
CO2: 21 MMOL/L (ref 21–32)
CO2: 23 MMOL/L (ref 21–32)
CREAT SERPL-MCNC: 1 MG/DL (ref 0.6–1.2)
CREAT SERPL-MCNC: 1.2 MG/DL (ref 0.6–1.2)
EOSINOPHILS ABSOLUTE: 0.2 K/UL (ref 0–0.6)
EOSINOPHILS RELATIVE PERCENT: 1.7 %
GFR AFRICAN AMERICAN: 51
GFR AFRICAN AMERICAN: >60
GFR NON-AFRICAN AMERICAN: 42
GFR NON-AFRICAN AMERICAN: 52
GLUCOSE BLD-MCNC: 138 MG/DL (ref 70–99)
GLUCOSE BLD-MCNC: 175 MG/DL (ref 70–99)
HCT VFR BLD CALC: 31.9 % (ref 36–48)
HEMOGLOBIN: 11.1 G/DL (ref 12–16)
LYMPHOCYTES ABSOLUTE: 0.5 K/UL (ref 1–5.1)
LYMPHOCYTES RELATIVE PERCENT: 3.6 %
MAGNESIUM: 2.2 MG/DL (ref 1.8–2.4)
MCH RBC QN AUTO: 31.8 PG (ref 26–34)
MCHC RBC AUTO-ENTMCNC: 34.7 G/DL (ref 31–36)
MCV RBC AUTO: 91.6 FL (ref 80–100)
MONOCYTES ABSOLUTE: 1.3 K/UL (ref 0–1.3)
MONOCYTES RELATIVE PERCENT: 10.1 %
NEUTROPHILS ABSOLUTE: 11.3 K/UL (ref 1.7–7.7)
NEUTROPHILS RELATIVE PERCENT: 84.5 %
PDW BLD-RTO: 15.3 % (ref 12.4–15.4)
PHOSPHORUS: 1.9 MG/DL (ref 2.5–4.9)
PHOSPHORUS: 3.2 MG/DL (ref 2.5–4.9)
PLATELET # BLD: 437 K/UL (ref 135–450)
PMV BLD AUTO: 7.8 FL (ref 5–10.5)
POTASSIUM SERPL-SCNC: 3.8 MMOL/L (ref 3.5–5.1)
POTASSIUM SERPL-SCNC: 4.1 MMOL/L (ref 3.5–5.1)
RBC # BLD: 3.49 M/UL (ref 4–5.2)
SODIUM BLD-SCNC: 138 MMOL/L (ref 136–145)
SODIUM BLD-SCNC: 138 MMOL/L (ref 136–145)
TOTAL PROTEIN: 5.9 G/DL (ref 6.4–8.2)
WBC # BLD: 13.3 K/UL (ref 4–11)

## 2021-08-08 PROCEDURE — 80076 HEPATIC FUNCTION PANEL: CPT

## 2021-08-08 PROCEDURE — 2500000003 HC RX 250 WO HCPCS: Performed by: STUDENT IN AN ORGANIZED HEALTH CARE EDUCATION/TRAINING PROGRAM

## 2021-08-08 PROCEDURE — 6360000002 HC RX W HCPCS: Performed by: STUDENT IN AN ORGANIZED HEALTH CARE EDUCATION/TRAINING PROGRAM

## 2021-08-08 PROCEDURE — 2580000003 HC RX 258: Performed by: STUDENT IN AN ORGANIZED HEALTH CARE EDUCATION/TRAINING PROGRAM

## 2021-08-08 PROCEDURE — 1200000000 HC SEMI PRIVATE

## 2021-08-08 PROCEDURE — 85025 COMPLETE CBC W/AUTO DIFF WBC: CPT

## 2021-08-08 PROCEDURE — 80069 RENAL FUNCTION PANEL: CPT

## 2021-08-08 PROCEDURE — 6370000000 HC RX 637 (ALT 250 FOR IP): Performed by: STUDENT IN AN ORGANIZED HEALTH CARE EDUCATION/TRAINING PROGRAM

## 2021-08-08 PROCEDURE — 36415 COLL VENOUS BLD VENIPUNCTURE: CPT

## 2021-08-08 PROCEDURE — 83735 ASSAY OF MAGNESIUM: CPT

## 2021-08-08 RX ORDER — 0.9 % SODIUM CHLORIDE 0.9 %
500 INTRAVENOUS SOLUTION INTRAVENOUS ONCE
Status: COMPLETED | OUTPATIENT
Start: 2021-08-08 | End: 2021-08-08

## 2021-08-08 RX ORDER — ACETAMINOPHEN 500 MG
1000 TABLET ORAL EVERY 6 HOURS
Status: DISCONTINUED | OUTPATIENT
Start: 2021-08-08 | End: 2021-08-16 | Stop reason: HOSPADM

## 2021-08-08 RX ORDER — HEPARIN SODIUM 5000 [USP'U]/ML
5000 INJECTION, SOLUTION INTRAVENOUS; SUBCUTANEOUS EVERY 8 HOURS SCHEDULED
Status: DISCONTINUED | OUTPATIENT
Start: 2021-08-09 | End: 2021-08-09

## 2021-08-08 RX ORDER — OXYCODONE HYDROCHLORIDE 5 MG/1
10 TABLET ORAL EVERY 4 HOURS PRN
Status: DISCONTINUED | OUTPATIENT
Start: 2021-08-08 | End: 2021-08-14

## 2021-08-08 RX ORDER — OXYCODONE HYDROCHLORIDE 5 MG/1
5 TABLET ORAL EVERY 4 HOURS PRN
Status: DISCONTINUED | OUTPATIENT
Start: 2021-08-08 | End: 2021-08-16 | Stop reason: HOSPADM

## 2021-08-08 RX ADMIN — PIPERACILLIN AND TAZOBACTAM 3375 MG: 3; .375 INJECTION, POWDER, LYOPHILIZED, FOR SOLUTION INTRAVENOUS at 18:37

## 2021-08-08 RX ADMIN — METOPROLOL TARTRATE 2.5 MG: 5 INJECTION INTRAVENOUS at 22:25

## 2021-08-08 RX ADMIN — ACETAMINOPHEN 1000 MG: 500 TABLET, FILM COATED ORAL at 23:49

## 2021-08-08 RX ADMIN — SODIUM CHLORIDE 500 ML: 9 INJECTION, SOLUTION INTRAVENOUS at 10:01

## 2021-08-08 RX ADMIN — ACETAMINOPHEN 1000 MG: 500 TABLET, FILM COATED ORAL at 18:36

## 2021-08-08 RX ADMIN — ACETAMINOPHEN 1000 MG: 500 TABLET, FILM COATED ORAL at 12:37

## 2021-08-08 RX ADMIN — METOPROLOL TARTRATE 2.5 MG: 5 INJECTION INTRAVENOUS at 04:03

## 2021-08-08 RX ADMIN — PIPERACILLIN AND TAZOBACTAM 3375 MG: 3; .375 INJECTION, POWDER, LYOPHILIZED, FOR SOLUTION INTRAVENOUS at 04:04

## 2021-08-08 RX ADMIN — Medication 10 ML: at 09:46

## 2021-08-08 RX ADMIN — ENOXAPARIN SODIUM 40 MG: 40 INJECTION SUBCUTANEOUS at 09:46

## 2021-08-08 RX ADMIN — ACETAMINOPHEN 1000 MG: 500 TABLET, FILM COATED ORAL at 06:41

## 2021-08-08 RX ADMIN — SODIUM PHOSPHATE, MONOBASIC, MONOHYDRATE 20 MMOL: 276; 142 INJECTION, SOLUTION INTRAVENOUS at 11:13

## 2021-08-08 RX ADMIN — PIPERACILLIN AND TAZOBACTAM 3375 MG: 3; .375 INJECTION, POWDER, LYOPHILIZED, FOR SOLUTION INTRAVENOUS at 12:37

## 2021-08-08 RX ADMIN — METOPROLOL TARTRATE 2.5 MG: 5 INJECTION INTRAVENOUS at 16:28

## 2021-08-08 RX ADMIN — METOPROLOL TARTRATE 2.5 MG: 5 INJECTION INTRAVENOUS at 09:46

## 2021-08-08 ASSESSMENT — PAIN DESCRIPTION - DESCRIPTORS: DESCRIPTORS: ACHING;SORE

## 2021-08-08 ASSESSMENT — PAIN SCALES - GENERAL
PAINLEVEL_OUTOF10: 0
PAINLEVEL_OUTOF10: 4
PAINLEVEL_OUTOF10: 0
PAINLEVEL_OUTOF10: 4
PAINLEVEL_OUTOF10: 3
PAINLEVEL_OUTOF10: 0
PAINLEVEL_OUTOF10: 2

## 2021-08-08 ASSESSMENT — PAIN DESCRIPTION - LOCATION: LOCATION: ABDOMEN

## 2021-08-08 ASSESSMENT — PAIN DESCRIPTION - PAIN TYPE: TYPE: ACUTE PAIN;SURGICAL PAIN

## 2021-08-08 ASSESSMENT — PAIN DESCRIPTION - FREQUENCY: FREQUENCY: INTERMITTENT

## 2021-08-08 ASSESSMENT — PAIN DESCRIPTION - ORIENTATION: ORIENTATION: RIGHT;UPPER

## 2021-08-08 NOTE — CONSULTS
Gastroenterology Consult Note                          Patient: Starla Hamm  : 1932  CSN#:      Date:  2021    Subjective:       History of Present Illness  Patient is a 80 y.o. female admitted with Acute cholecystitis [K81.0]  Acute calculous cholecystitis [K80.00] who is seen in consult for bile leak. H/o CVA on plavix at home. Pt admitted with cholecystitis. Her cholecystectomy was complicated by significant inflammation of the GB. This made the cholecystectomy difficult and incomplete. ZAYRA drains were placed in the GB fossa. Pt does feel improved and is now eating regular diet. Has no c/o pain but is tender in RUQ. No fevers overnight. Past Medical History:   Diagnosis Date    CVA (cerebral vascular accident) ([facility] [address] 2019    Hypertension       History reviewed. No pertinent surgical history. Admission Meds  No current facility-administered medications on file prior to encounter. Current Outpatient Medications on File Prior to Encounter   Medication Sig Dispense Refill    clopidogrel (PLAVIX) 75 MG tablet TAKE 1 TABLET BY MOUTH DAILY 30 tablet 0    lisinopril (PRINIVIL;ZESTRIL) 10 MG tablet Take 1 tablet by mouth daily 30 tablet 3    aspirin 81 MG EC tablet Take 1 tablet by mouth daily 30 tablet 3    atorvastatin (LIPITOR) 40 MG tablet Take 1 tablet by mouth nightly 30 tablet 3    metoprolol tartrate (LOPRESSOR) 25 MG tablet Take 1 tablet by mouth 2 times daily 60 tablet 5    amLODIPine (NORVASC) 10 MG tablet Take 1 tablet by mouth daily 30 tablet 3    clopidogrel (PLAVIX) 75 MG tablet Take 1 tablet by mouth daily 30 tablet 0           Allergies  No Known Allergies   Social   Social History     Tobacco Use    Smoking status: Never Smoker    Smokeless tobacco: Never Used   Substance Use Topics    Alcohol use: No        Family History   Problem Relation Age of Onset    Diabetes Brother         Review of Systems  RUQ pain now improved.   10 sys otherwise neg       Physical Exam  Blood pressure 133/75, pulse 107, temperature 98.5 °F (36.9 °C), temperature source Oral, resp. rate 16, height 5' 7\" (1.702 m), weight 161 lb 12.8 oz (73.4 kg), SpO2 93 %. General appearance: alert, cooperative, no distress, appears stated age  Eyes: Anicteric  Head: Normocephalic, without obvious abnormality  Lungs: clear to auscultation bilaterally, Normal Effort  Tachy regular:    Abdomen: soft, + RUQ ttp appropriate post op. . Bowel sounds normal. No masses,  no organomegaly. Extremities: atraumatic, no cyanosis or edema  Skin: warm and dry, no jaundice  Neuro: Grossly intact, A&OX3      Data Review:    Recent Labs     08/06/21  0501 08/07/21  0640 08/08/21  0601   WBC 11.6* 14.1* 13.3*   HGB 10.2* 11.4* 11.1*   HCT 29.8* 33.0* 31.9*   MCV 93.4 91.8 91.6    421 437     Recent Labs     08/06/21  0501 08/07/21  0641 08/08/21  0601    137 138   K 4.3 4.5 4.1    105 105   CO2 20* 22 23   PHOS 3.1 3.4 1.9*   BUN 26* 24* 22*   CREATININE 1.5* 1.1 1.2     Recent Labs     08/07/21  0641 08/08/21  0601   AST 27 19   ALT 18 12   BILIDIR <0.2 <0.2   BILITOT 0.6 0.4   ALKPHOS 157* 141*     No results for input(s): LIPASE, AMYLASE in the last 72 hours. Recent Labs     08/06/21  0501   PROTIME 15.6*   INR 1.36*      Ct ab reviewed                Assessment:     1.. bile leak s/p subtotal cholecstectomy- ZAYRA drain output is decreasing every shift 200-->125-->90  2. Acute purulent cholecystitis. Wbc decreasing. Recommendations:   1. Will cont to monitor ZAYRA drain output. If does not continue to decrease may need ERCP with plastic biliary stent. 2. Cont abx  3.  Dr. Rony Chavarria to see in am.     Janet Clarke MD  [address]

## 2021-08-08 NOTE — PROGRESS NOTES
Patient alert and oriented. VSS Patient c/o  pain Dilaudid given. CDI surgical sites. ZAYRA to RQ. Patient in bed lowest position call light and bedside table within reach. All needs are met at this time. Patient aware to call if any help needed. Will continue to monitor  /75   Pulse 107   Temp 98.5 °F (36.9 °C) (Oral)   Resp 16   Ht 5' 7\" (1.702 m)   Wt 161 lb 12.8 oz (73.4 kg)   SpO2 93%   BMI 25.34 kg/m² No

## 2021-08-08 NOTE — PROGRESS NOTES
Pt up to Br and chair multiple times with walker and SBA. Pt rt ZAYRA with bilious drainage. Rt IV pulled out by pt replaced with 22 g in rt FA 1 stick, infusing IV ABX as ordered. Pt tolerated PO diet no co N/V. Pt NPO after MN for poss procedure with GI. Pt given 500 ml NS bolus per orders with clear yellow urine out put. Surgical sites well approximated, pt with active BS and small formed BM this shift. Pt aware to call with needs, call light with in reach family at bedside and Bed alarm on.   Electronically signed by Fabio Cuadra RN on 8/8/2021 at 5:32 PM

## 2021-08-09 LAB
ALBUMIN SERPL-MCNC: 2.5 G/DL (ref 3.4–5)
ALP BLD-CCNC: 143 U/L (ref 40–129)
ALT SERPL-CCNC: 11 U/L (ref 10–40)
ANION GAP SERPL CALCULATED.3IONS-SCNC: 11 MMOL/L (ref 3–16)
AST SERPL-CCNC: 19 U/L (ref 15–37)
BASOPHILS ABSOLUTE: 0 K/UL (ref 0–0.2)
BASOPHILS RELATIVE PERCENT: 0 %
BILIRUB SERPL-MCNC: 0.6 MG/DL (ref 0–1)
BILIRUBIN DIRECT: <0.2 MG/DL (ref 0–0.3)
BILIRUBIN, INDIRECT: ABNORMAL MG/DL (ref 0–1)
BUN BLDV-MCNC: 13 MG/DL (ref 7–20)
CALCIUM SERPL-MCNC: 8.4 MG/DL (ref 8.3–10.6)
CHLORIDE BLD-SCNC: 107 MMOL/L (ref 99–110)
CO2: 22 MMOL/L (ref 21–32)
CREAT SERPL-MCNC: 0.9 MG/DL (ref 0.6–1.2)
EOSINOPHILS ABSOLUTE: 0.4 K/UL (ref 0–0.6)
EOSINOPHILS RELATIVE PERCENT: 4 %
GFR AFRICAN AMERICAN: >60
GFR NON-AFRICAN AMERICAN: 59
GLUCOSE BLD-MCNC: 148 MG/DL (ref 70–99)
HCT VFR BLD CALC: 33.4 % (ref 36–48)
HEMOGLOBIN: 11.7 G/DL (ref 12–16)
LYMPHOCYTES ABSOLUTE: 0.6 K/UL (ref 1–5.1)
LYMPHOCYTES RELATIVE PERCENT: 6 %
MAGNESIUM: 1.8 MG/DL (ref 1.8–2.4)
MCH RBC QN AUTO: 32 PG (ref 26–34)
MCHC RBC AUTO-ENTMCNC: 35 G/DL (ref 31–36)
MCV RBC AUTO: 91.5 FL (ref 80–100)
MONOCYTES ABSOLUTE: 1 K/UL (ref 0–1.3)
MONOCYTES RELATIVE PERCENT: 10 %
NEUTROPHILS ABSOLUTE: 7.9 K/UL (ref 1.7–7.7)
NEUTROPHILS RELATIVE PERCENT: 80 %
PDW BLD-RTO: 15.5 % (ref 12.4–15.4)
PHOSPHORUS: 2.4 MG/DL (ref 2.5–4.9)
PLATELET # BLD: 429 K/UL (ref 135–450)
PMV BLD AUTO: 7.6 FL (ref 5–10.5)
POTASSIUM SERPL-SCNC: 3.6 MMOL/L (ref 3.5–5.1)
RBC # BLD: 3.66 M/UL (ref 4–5.2)
SODIUM BLD-SCNC: 140 MMOL/L (ref 136–145)
TOTAL PROTEIN: 6.1 G/DL (ref 6.4–8.2)
WBC # BLD: 9.9 K/UL (ref 4–11)

## 2021-08-09 PROCEDURE — 94669 MECHANICAL CHEST WALL OSCILL: CPT

## 2021-08-09 PROCEDURE — 80076 HEPATIC FUNCTION PANEL: CPT

## 2021-08-09 PROCEDURE — 83735 ASSAY OF MAGNESIUM: CPT

## 2021-08-09 PROCEDURE — 80069 RENAL FUNCTION PANEL: CPT

## 2021-08-09 PROCEDURE — 2580000003 HC RX 258: Performed by: STUDENT IN AN ORGANIZED HEALTH CARE EDUCATION/TRAINING PROGRAM

## 2021-08-09 PROCEDURE — 6360000002 HC RX W HCPCS: Performed by: SURGERY

## 2021-08-09 PROCEDURE — 85025 COMPLETE CBC W/AUTO DIFF WBC: CPT

## 2021-08-09 PROCEDURE — 1200000000 HC SEMI PRIVATE

## 2021-08-09 PROCEDURE — 36415 COLL VENOUS BLD VENIPUNCTURE: CPT

## 2021-08-09 PROCEDURE — 2500000003 HC RX 250 WO HCPCS: Performed by: STUDENT IN AN ORGANIZED HEALTH CARE EDUCATION/TRAINING PROGRAM

## 2021-08-09 PROCEDURE — 6370000000 HC RX 637 (ALT 250 FOR IP): Performed by: STUDENT IN AN ORGANIZED HEALTH CARE EDUCATION/TRAINING PROGRAM

## 2021-08-09 PROCEDURE — 94150 VITAL CAPACITY TEST: CPT

## 2021-08-09 PROCEDURE — 6360000002 HC RX W HCPCS

## 2021-08-09 PROCEDURE — 6360000002 HC RX W HCPCS: Performed by: STUDENT IN AN ORGANIZED HEALTH CARE EDUCATION/TRAINING PROGRAM

## 2021-08-09 RX ORDER — MAGNESIUM SULFATE IN WATER 40 MG/ML
2000 INJECTION, SOLUTION INTRAVENOUS ONCE
Status: COMPLETED | OUTPATIENT
Start: 2021-08-09 | End: 2021-08-09

## 2021-08-09 RX ORDER — SODIUM CHLORIDE, SODIUM LACTATE, POTASSIUM CHLORIDE, CALCIUM CHLORIDE 600; 310; 30; 20 MG/100ML; MG/100ML; MG/100ML; MG/100ML
INJECTION, SOLUTION INTRAVENOUS CONTINUOUS
Status: DISCONTINUED | OUTPATIENT
Start: 2021-08-09 | End: 2021-08-09

## 2021-08-09 RX ORDER — AMLODIPINE BESYLATE 10 MG/1
10 TABLET ORAL DAILY
Status: DISCONTINUED | OUTPATIENT
Start: 2021-08-10 | End: 2021-08-16 | Stop reason: HOSPADM

## 2021-08-09 RX ORDER — POTASSIUM CHLORIDE 20 MEQ/1
40 TABLET, EXTENDED RELEASE ORAL ONCE
Status: CANCELLED | OUTPATIENT
Start: 2021-08-09 | End: 2021-08-09

## 2021-08-09 RX ORDER — ATORVASTATIN CALCIUM 40 MG/1
40 TABLET, FILM COATED ORAL NIGHTLY
Status: DISCONTINUED | OUTPATIENT
Start: 2021-08-09 | End: 2021-08-16 | Stop reason: HOSPADM

## 2021-08-09 RX ORDER — AMLODIPINE BESYLATE 10 MG/1
10 TABLET ORAL DAILY
Status: DISCONTINUED | OUTPATIENT
Start: 2021-08-09 | End: 2021-08-09

## 2021-08-09 RX ADMIN — ENOXAPARIN SODIUM 40 MG: 40 INJECTION SUBCUTANEOUS at 16:13

## 2021-08-09 RX ADMIN — METOPROLOL TARTRATE 25 MG: 25 TABLET, FILM COATED ORAL at 20:11

## 2021-08-09 RX ADMIN — SODIUM CHLORIDE, POTASSIUM CHLORIDE, SODIUM LACTATE AND CALCIUM CHLORIDE: 600; 310; 30; 20 INJECTION, SOLUTION INTRAVENOUS at 06:23

## 2021-08-09 RX ADMIN — PIPERACILLIN AND TAZOBACTAM 3375 MG: 3; .375 INJECTION, POWDER, LYOPHILIZED, FOR SOLUTION INTRAVENOUS at 18:37

## 2021-08-09 RX ADMIN — ONDANSETRON 4 MG: 2 INJECTION INTRAMUSCULAR; INTRAVENOUS at 15:11

## 2021-08-09 RX ADMIN — METOPROLOL TARTRATE 2.5 MG: 5 INJECTION INTRAVENOUS at 03:39

## 2021-08-09 RX ADMIN — Medication 10 ML: at 20:11

## 2021-08-09 RX ADMIN — METOPROLOL TARTRATE 2.5 MG: 5 INJECTION INTRAVENOUS at 10:00

## 2021-08-09 RX ADMIN — MAGNESIUM SULFATE HEPTAHYDRATE 2000 MG: 2 INJECTION, SOLUTION INTRAVENOUS at 10:09

## 2021-08-09 RX ADMIN — METOPROLOL TARTRATE 2.5 MG: 5 INJECTION INTRAVENOUS at 16:14

## 2021-08-09 RX ADMIN — PIPERACILLIN AND TAZOBACTAM 3375 MG: 3; .375 INJECTION, POWDER, LYOPHILIZED, FOR SOLUTION INTRAVENOUS at 11:09

## 2021-08-09 RX ADMIN — PIPERACILLIN AND TAZOBACTAM 3375 MG: 3; .375 INJECTION, POWDER, LYOPHILIZED, FOR SOLUTION INTRAVENOUS at 03:45

## 2021-08-09 ASSESSMENT — PAIN SCALES - GENERAL
PAINLEVEL_OUTOF10: 0
PAINLEVEL_OUTOF10: 0

## 2021-08-09 NOTE — PROGRESS NOTES
Surgery Daily Progress Note  Valery Valera  CC:  Symptomatic cholelitiasis      Subjective : No acute events overnight. Remains mildly tachycardic throughout evening. Denies any abdominal pain. Denies any hunger      Objective    Infusions:   lactated ringers      sodium chloride          I/O:I/O last 3 completed shifts:  In: -   Out: 1005 [Urine:825; Drains:180]           Wt Readings from Last 1 Encounters:   08/05/21 161 lb 12.8 oz (73.4 kg)                 LABS:    Recent Labs     08/07/21  0640 08/08/21  0601   WBC 14.1* 13.3*   HGB 11.4* 11.1*   HCT 33.0* 31.9*   MCV 91.8 91.6    437        Recent Labs     08/08/21  0601 08/08/21  1422    138   K 4.1 3.8    104   CO2 23 21   PHOS 1.9* 3.2   BUN 22* 19   CREATININE 1.2 1.0        Recent Labs     08/07/21  0641 08/08/21  0601   AST 27 19   ALT 18 12   BILIDIR <0.2 <0.2   BILITOT 0.6 0.4   ALKPHOS 157* 141*      No results for input(s): LIPASE, AMYLASE in the last 72 hours. Recent Labs     08/07/21  0641 08/08/21  0601   PROT 6.0* 5.9*      No results for input(s): CKTOTAL, CKMB, CKMBINDEX, TROPONINI in the last 72 hours. Exam:/84   Pulse 106   Temp 97 °F (36.1 °C) (Oral)   Resp 18   Ht 5' 7\" (1.702 m)   Wt 161 lb 12.8 oz (73.4 kg)   SpO2 92%   BMI 25.34 kg/m²   General appearance: alert, appears stated age and cooperative  Cardiovascular: RRR, brisk capillary refill  Abdomen: Soft, appropriately-tender, incisions c/d/i and well approximated with Dermabond ZAYRA drain in place with bilious output  Skin: warm and dry, no rashes  Extremities: no edema, no cyanosis  Neuro: A&Ox3, no focal deficits, sensation intact      ASSESSMENT/PLAN: Pt. is a 80 y.o. female s/p laparoscopic subtotal cholecystectomy (8/6)    - patient seen by GI yesterday. Wants to continue to monitor ZAYRA drain output. If does not decrease may consider ERCP with biliary stent.  Will follow up recommendations  - continue abx  - patient to remain NPO until final recs per LOUIS Baker, SWEETIE - CNP 8/9/2021 6:23 AM  [phone]    I performed a history and physical examination of the patient and discussed management with the resident. I reviewed the resident's note and agree with the documented findings and plan of care.     Deyanira Mckeon M.D.  8/9/21   1:23 PM

## 2021-08-09 NOTE — PLAN OF CARE
Problem: Falls - Risk of:  Goal: Will remain free from falls  Description: Will remain free from falls  Outcome: Ongoing  Note: Bed in lowest setting. Call light within reach. Bed alarm on. Gripper socks on. A&O x4. Nurse rounding on patient frequently for visual checks and bathroom needs. Problem: Pain:  Goal: Pain level will decrease  Description: Pain level will decrease  Outcome: Ongoing  Note: Patient encouraged to call if in pain. Patient denies pain at this time. Will continue to monitor.

## 2021-08-09 NOTE — CARE COORDINATION
Cm following, GI following holding on ERCP as Biliary drainage improving, but will closely monitor. Plan for pt to return home with support of ava and [facility] for drain care. [address] [place] [address] skilled care will follow at [place].   Electronically signed by Trixie Fuentes RN on 8/9/2021 at 3:46 PM  [phone]

## 2021-08-09 NOTE — PROGRESS NOTES
Patient alert . VSS Patient denies any pain or nausea. CDI surgical sites, ZAYRA to RQ Patient in bed lowest position call light and bedside table within reach. All needs are met at this time. Patient aware to call if any help needed. Will continue to monitor  /84   Pulse 106   Temp 97 °F (36.1 °C) (Oral)   Resp 18   Ht 5' 7\" (1.702 m)   Wt 161 lb 12.8 oz (73.4 kg)   SpO2 92%   BMI 25.34 kg/m²

## 2021-08-09 NOTE — PROGRESS NOTES
Patient A&O x4. VSS. ZAYRA drain intact and dressing is intact with small amount of drainage. Patient is voiding freely. Patient denies pain at this time. Patient has IV fluids infusing per orders. Call light is within reach, bed in lowest setting with alarm on. Rounding on patient frequently and will continue to monitor.

## 2021-08-09 NOTE — PROGRESS NOTES
[facility] GI  Gastroenterology Progress Note  Kylah Wilkerson is a 80 y.o. female patient. 1. Acute cholecystitis        SUBJECTIVE:      Pt denies abdom pain. Physical    VITALS:  /84   Pulse 117   Temp 98.9 °F (37.2 °C) (Oral)   Resp 16   Ht 5' 7\" (1.702 m)   Wt 162 lb 14.7 oz (73.9 kg)   SpO2 92%   BMI 25.52 kg/m²   TEMPERATURE:  Current - Temp: 98.9 °F (37.2 °C); Max - Temp  Av.1 °F (36.7 °C)  Min: 97 °F (36.1 °C)  Max: 98.9 °F (37.2 °C)    Abdomen soft, ND, diffusely TTP, drain in place. no HSM, Bowel sounds normal     Data      Recent Labs     21  0640 21  0601 21  0548   WBC 14.1* 13.3* 9.9   HGB 11.4* 11.1* 11.7*   HCT 33.0* 31.9* 33.4*   MCV 91.8 91.6 91.5    437 429     Recent Labs     21  0601 21  1422 21  0548    138 140   K 4.1 3.8 3.6    104 107   CO2 23 21 22   PHOS 1.9* 3.2 2.4*   BUN 22* 19 13   CREATININE 1.2 1.0 0.9     Recent Labs     21  0641 21  0601 21  0548   AST 27 19 19   ALT 18 12 11   BILIDIR <0.2 <0.2 <0.2   BILITOT 0.6 0.4 0.6   ALKPHOS 157* 141* 143*     No results for input(s): LIPASE, AMYLASE in the last 72 hours. ASSESSMENT   1.. bile leak s/p subtotal cholecstectomy- ZAYRA drain output is decreasin-->125-->90>>30>>65.    2. Acute purulent cholecystitis. Wbc decreasing. PLAN    1. Hold off on ERCP since biliary drainage is improving and pt clinically stable.    2. Will follow       Keli Núñez MD  [facility]

## 2021-08-10 ENCOUNTER — APPOINTMENT (OUTPATIENT)
Dept: GENERAL RADIOLOGY | Age: 86
DRG: 418 | End: 2021-08-10
Payer: MEDICARE

## 2021-08-10 LAB
ALBUMIN SERPL-MCNC: 2.4 G/DL (ref 3.4–5)
ALP BLD-CCNC: 119 U/L (ref 40–129)
ALT SERPL-CCNC: 15 U/L (ref 10–40)
ANION GAP SERPL CALCULATED.3IONS-SCNC: 9 MMOL/L (ref 3–16)
ANISOCYTOSIS: ABNORMAL
AST SERPL-CCNC: 23 U/L (ref 15–37)
BASOPHILS ABSOLUTE: 0 K/UL (ref 0–0.2)
BASOPHILS RELATIVE PERCENT: 0 %
BILIRUB SERPL-MCNC: 0.6 MG/DL (ref 0–1)
BILIRUBIN DIRECT: <0.2 MG/DL (ref 0–0.3)
BILIRUBIN, INDIRECT: ABNORMAL MG/DL (ref 0–1)
BUN BLDV-MCNC: 16 MG/DL (ref 7–20)
CALCIUM SERPL-MCNC: 7.6 MG/DL (ref 8.3–10.6)
CHLORIDE BLD-SCNC: 105 MMOL/L (ref 99–110)
CO2: 24 MMOL/L (ref 21–32)
CREAT SERPL-MCNC: 0.9 MG/DL (ref 0.6–1.2)
EKG ATRIAL RATE: 72 BPM
EKG DIAGNOSIS: NORMAL
EKG P AXIS: 80 DEGREES
EKG P-R INTERVAL: 164 MS
EKG Q-T INTERVAL: 402 MS
EKG QRS DURATION: 80 MS
EKG QTC CALCULATION (BAZETT): 440 MS
EKG R AXIS: 29 DEGREES
EKG T AXIS: 45 DEGREES
EKG VENTRICULAR RATE: 72 BPM
EOSINOPHILS ABSOLUTE: 0.1 K/UL (ref 0–0.6)
EOSINOPHILS RELATIVE PERCENT: 1 %
GFR AFRICAN AMERICAN: >60
GFR NON-AFRICAN AMERICAN: 59
GLUCOSE BLD-MCNC: 117 MG/DL (ref 70–99)
HCT VFR BLD CALC: 32.2 % (ref 36–48)
HEMOGLOBIN: 11.3 G/DL (ref 12–16)
INR BLD: 1.14 (ref 0.88–1.12)
LYMPHOCYTES ABSOLUTE: 0.5 K/UL (ref 1–5.1)
LYMPHOCYTES RELATIVE PERCENT: 5 %
MAGNESIUM: 2.3 MG/DL (ref 1.8–2.4)
MCH RBC QN AUTO: 32.1 PG (ref 26–34)
MCHC RBC AUTO-ENTMCNC: 35 G/DL (ref 31–36)
MCV RBC AUTO: 91.7 FL (ref 80–100)
MONOCYTES ABSOLUTE: 1.2 K/UL (ref 0–1.3)
MONOCYTES RELATIVE PERCENT: 12 %
NEUTROPHILS ABSOLUTE: 8.4 K/UL (ref 1.7–7.7)
NEUTROPHILS RELATIVE PERCENT: 82 %
OVALOCYTES: ABNORMAL
PDW BLD-RTO: 15.2 % (ref 12.4–15.4)
PHOSPHORUS: 2.5 MG/DL (ref 2.5–4.9)
PLATELET # BLD: 445 K/UL (ref 135–450)
PMV BLD AUTO: 7.6 FL (ref 5–10.5)
POLYCHROMASIA: ABNORMAL
POTASSIUM SERPL-SCNC: 3.6 MMOL/L (ref 3.5–5.1)
PROTHROMBIN TIME: 12.9 SEC (ref 9.9–12.7)
RBC # BLD: 3.52 M/UL (ref 4–5.2)
SODIUM BLD-SCNC: 138 MMOL/L (ref 136–145)
TOTAL PROTEIN: 5.8 G/DL (ref 6.4–8.2)
WBC # BLD: 10.3 K/UL (ref 4–11)

## 2021-08-10 PROCEDURE — 99232 SBSQ HOSP IP/OBS MODERATE 35: CPT | Performed by: INTERNAL MEDICINE

## 2021-08-10 PROCEDURE — 83735 ASSAY OF MAGNESIUM: CPT

## 2021-08-10 PROCEDURE — 71045 X-RAY EXAM CHEST 1 VIEW: CPT

## 2021-08-10 PROCEDURE — 85025 COMPLETE CBC W/AUTO DIFF WBC: CPT

## 2021-08-10 PROCEDURE — C1769 GUIDE WIRE: HCPCS

## 2021-08-10 PROCEDURE — 80069 RENAL FUNCTION PANEL: CPT

## 2021-08-10 PROCEDURE — 94150 VITAL CAPACITY TEST: CPT

## 2021-08-10 PROCEDURE — 85610 PROTHROMBIN TIME: CPT

## 2021-08-10 PROCEDURE — 1200000000 HC SEMI PRIVATE

## 2021-08-10 PROCEDURE — 2580000003 HC RX 258: Performed by: STUDENT IN AN ORGANIZED HEALTH CARE EDUCATION/TRAINING PROGRAM

## 2021-08-10 PROCEDURE — 6370000000 HC RX 637 (ALT 250 FOR IP)

## 2021-08-10 PROCEDURE — 36415 COLL VENOUS BLD VENIPUNCTURE: CPT

## 2021-08-10 PROCEDURE — 2500000003 HC RX 250 WO HCPCS: Performed by: STUDENT IN AN ORGANIZED HEALTH CARE EDUCATION/TRAINING PROGRAM

## 2021-08-10 PROCEDURE — 6370000000 HC RX 637 (ALT 250 FOR IP): Performed by: STUDENT IN AN ORGANIZED HEALTH CARE EDUCATION/TRAINING PROGRAM

## 2021-08-10 PROCEDURE — 36569 INSJ PICC 5 YR+ W/O IMAGING: CPT

## 2021-08-10 PROCEDURE — C1751 CATH, INF, PER/CENT/MIDLINE: HCPCS

## 2021-08-10 PROCEDURE — 02HV33Z INSERTION OF INFUSION DEVICE INTO SUPERIOR VENA CAVA, PERCUTANEOUS APPROACH: ICD-10-PCS | Performed by: STUDENT IN AN ORGANIZED HEALTH CARE EDUCATION/TRAINING PROGRAM

## 2021-08-10 PROCEDURE — 80076 HEPATIC FUNCTION PANEL: CPT

## 2021-08-10 PROCEDURE — 6360000002 HC RX W HCPCS: Performed by: SURGERY

## 2021-08-10 PROCEDURE — 94669 MECHANICAL CHEST WALL OSCILL: CPT

## 2021-08-10 PROCEDURE — 6360000002 HC RX W HCPCS: Performed by: STUDENT IN AN ORGANIZED HEALTH CARE EDUCATION/TRAINING PROGRAM

## 2021-08-10 RX ORDER — SODIUM CHLORIDE 0.9 % (FLUSH) 0.9 %
5-40 SYRINGE (ML) INJECTION EVERY 12 HOURS SCHEDULED
Status: DISCONTINUED | OUTPATIENT
Start: 2021-08-10 | End: 2021-08-16 | Stop reason: HOSPADM

## 2021-08-10 RX ORDER — SODIUM CHLORIDE 0.9 % (FLUSH) 0.9 %
5-40 SYRINGE (ML) INJECTION PRN
Status: DISCONTINUED | OUTPATIENT
Start: 2021-08-10 | End: 2021-08-16 | Stop reason: HOSPADM

## 2021-08-10 RX ORDER — SODIUM CHLORIDE 9 MG/ML
25 INJECTION, SOLUTION INTRAVENOUS PRN
Status: DISCONTINUED | OUTPATIENT
Start: 2021-08-10 | End: 2021-08-16 | Stop reason: HOSPADM

## 2021-08-10 RX ORDER — LIDOCAINE HYDROCHLORIDE 10 MG/ML
5 INJECTION, SOLUTION INFILTRATION; PERINEURAL ONCE
Status: COMPLETED | OUTPATIENT
Start: 2021-08-10 | End: 2021-08-10

## 2021-08-10 RX ADMIN — AMLODIPINE BESYLATE 10 MG: 10 TABLET ORAL at 08:38

## 2021-08-10 RX ADMIN — ATORVASTATIN CALCIUM 40 MG: 40 TABLET, FILM COATED ORAL at 20:40

## 2021-08-10 RX ADMIN — TRACE ELEMENTS INJECTION 4: 7.4; .75; 98; 151 INJECTION, SOLUTION INTRAVENOUS at 18:36

## 2021-08-10 RX ADMIN — DIBASIC SODIUM PHOSPHATE, MONOBASIC POTASSIUM PHOSPHATE AND MONOBASIC SODIUM PHOSPHATE 2 TABLET: 852; 155; 130 TABLET ORAL at 16:01

## 2021-08-10 RX ADMIN — ENOXAPARIN SODIUM 40 MG: 40 INJECTION SUBCUTANEOUS at 16:01

## 2021-08-10 RX ADMIN — PIPERACILLIN AND TAZOBACTAM 3375 MG: 3; .375 INJECTION, POWDER, LYOPHILIZED, FOR SOLUTION INTRAVENOUS at 11:56

## 2021-08-10 RX ADMIN — Medication 10 ML: at 20:43

## 2021-08-10 RX ADMIN — DIBASIC SODIUM PHOSPHATE, MONOBASIC POTASSIUM PHOSPHATE AND MONOBASIC SODIUM PHOSPHATE 2 TABLET: 852; 155; 130 TABLET ORAL at 08:38

## 2021-08-10 RX ADMIN — PIPERACILLIN AND TAZOBACTAM 3375 MG: 3; .375 INJECTION, POWDER, LYOPHILIZED, FOR SOLUTION INTRAVENOUS at 03:05

## 2021-08-10 RX ADMIN — METOPROLOL TARTRATE 25 MG: 25 TABLET, FILM COATED ORAL at 08:38

## 2021-08-10 RX ADMIN — PIPERACILLIN AND TAZOBACTAM 3375 MG: 3; .375 INJECTION, POWDER, LYOPHILIZED, FOR SOLUTION INTRAVENOUS at 18:33

## 2021-08-10 RX ADMIN — METOPROLOL TARTRATE 25 MG: 25 TABLET, FILM COATED ORAL at 20:39

## 2021-08-10 RX ADMIN — Medication 10 ML: at 08:38

## 2021-08-10 RX ADMIN — LIDOCAINE HYDROCHLORIDE 5 ML: 10 INJECTION, SOLUTION INFILTRATION; PERINEURAL at 11:03

## 2021-08-10 NOTE — CARE COORDINATION
Cm following, pt getting PICC and starting TPN today. Cardiology signed off as stable at this time. ZAYRA output increased GI following. Cont low fat diet, poss HHC vs SNF needs at DC, LM with son about DC plan.   Electronically signed by Angie Harmon RN on 8/10/2021 at 4:48 PM  [phone]

## 2021-08-10 NOTE — PROCEDURES
PICC PROCEDURE NOTE WITH PCXR NEEDED  Chart reviewed for allergies, diagnosis, labs, known contraindications, reason for line placement and planned length of treatment. Informed consent noted to be signed and on chart. Insertion procedure discussed with patient/family member. Three patient identifiers  Patient name,   and MRN -  completed &  confirmed verbally. Time out performed Hat, mask and eye shield donned. PICC site scrubbed with Chloraprep  One-Step applicator for 30 seconds x 1. Hand Hygiene  performed with 3% Chlorhexidine surgical scrub x1 min prior to  Sterile gloves, sterile gown being donned. Patient draped using maximal sterile barrier technique ( head to toe ). PICC site scrubbed a 2nd time with Chloraprep One-Step applicator x 30 sec. Modified Seldinger  technique/ultrasound assisted and tip locating system utilized for insertion. 1% Lidocaine 5 ml injected interdermally pre-insertion. PCXR obtained d/t unable to confirm PICC tip with 3CG, will place note and order with clarification of radiologist reading for clearance to use line when available and notify RN. Positive brisk blood return obtained from all lumens  and each lumen flushed with 10 mls  0.9% Sterile Sodium Chloride. All lumens flush easily with no resistance. Valve placed on all lumens followed by Alcohol Swab Caps on end of each. Bio-patch in place. Catheter secured with non-sutured locking device per hospital protocol. Sterile Tegaderm applied over PICC site. Sterile field maintained during procedure. Guide wire and positioning wire accounted for post procedure and disposed of in sharps. Appearance of site is Clean dry and intact without bleeding or edema. All edges of Tegaderm occlusive. Site marked with date and initials of RN placing line. Teaching performed to pt/family and noted in education section. Bed placed in low position post-procedure. Top 2 side rails in up position call button in reach. Pt. Response to procedure tolerated well. RN notified of all of the above. A Dual Power PICC was trimmed at 41 CM and placed YOUNG basilic vein. 1 cm out at site.

## 2021-08-10 NOTE — PROGRESS NOTES
Colorectal Surgery   Daily Progress Note  Dorothye Mohs  CC:  Symptomatic cholelitiasis    SUBJECTIVE:  Patient resting comfortably this AM. States that she has no appetite. Denies any abdominal pain this AM. Denies any nausea. Passing BMs.     ROS:   A 14 point review of systems was conducted, significant findings as noted in HPI. All other systems negative. OBJECTIVE:   Infusions:   sodium chloride          I/O:I/O last 3 completed shifts: In: 647.2 [P.O.:110; IV Piggyback:537.2]  Out: 1040 [Urine:875; Drains:165]           Wt Readings from Last 1 Encounters:   08/09/21 162 lb 14.7 oz (73.9 kg)     LABS:    Recent Labs     08/08/21  0601 08/09/21  0548   WBC 13.3* 9.9   HGB 11.1* 11.7*   HCT 31.9* 33.4*   MCV 91.6 91.5    429     Recent Labs     08/08/21  1422 08/09/21  0548    140   K 3.8 3.6    107   CO2 21 22   PHOS 3.2 2.4*   BUN 19 13   CREATININE 1.0 0.9     Recent Labs     08/08/21  0601 08/09/21  0548   AST 19 19   ALT 12 11   BILIDIR <0.2 <0.2   BILITOT 0.4 0.6   ALKPHOS 141* 143*      No results for input(s): LIPASE, AMYLASE in the last 72 hours. Recent Labs     08/08/21  0601 08/09/21  0548   PROT 5.9* 6.1*      No results for input(s): CKTOTAL, CKMB, CKMBINDEX, TROPONINI in the last 72 hours. Exam  BP (!) 145/79   Pulse 102   Temp 98.6 °F (37 °C) (Oral)   Resp 16   Ht 5' 7\" (1.702 m)   Wt 162 lb 14.7 oz (73.9 kg)   SpO2 93%   BMI 25.52 kg/m²      General Appearance: alert, appears stated age and cooperative  Cardiovascular: RRR, brisk capillary refill  Abdomen: Soft, appropriately-tender, incisions c/d/i and well approximated with Dermabond, ZAYRA drain in place with bilious output  Skin: warm and dry, no rashes  Extremities: no edema, no cyanosis  Neuro: A&Ox3, no focal deficits, sensation intact    ASSESSMENT/PLAN:   Pt. is a 80 y.o. female s/p laparoscopic subtotal cholecystectomy (8/6).  POD4.    - Post-operative course complicated with bile leak evidenced by bilious output from ZAYRA     - Appreciate GI consult     - Will hold off on ERCP at this time  - Continue ABx  - Continue low fat diet  - PT/OT consult    Sam Santana MD, MPH  PGY-3 General Surgery  08/10/21  6:36 AM

## 2021-08-10 NOTE — PROGRESS NOTES
Patient alert and oriented x4. VSS. Surgical sites all CDI with no surrounding redness or drainage noted. ZAYRA site intact with little drainage noted on dressing, moderate bilious drainage, see flowsheets. Patient with frequent urination, but voiding adequately. PICC line in place to RUE, TPN to be started. Denies any pain at this time. Denies any nausea. Patient denies any other needs at this time. Bed is in the lowest position, call light and bedside table within reach. Patient bed alarm is on. Will continue to monitor for changes in patient status.      Electronically signed by Tara Stanton RN on 8/10/2021 at 3:15 PM

## 2021-08-10 NOTE — CONSULTS
Comprehensive Nutrition Assessment    Type and Reason for Visit:  Initial, Consult    NUTRITION RECOMMENDATIONS:   1. PO Diet: Continue regular; low fat diet  2. Nutrition support: PN recs as follows  1. Day 1: Start PN Clinimix 5/15E at goal rate 41.7 mL per hour to provide 1000 mL total volume, 710 calories, 50 g protein, 150 g dextrose. 2. Day 2: Advance PN Clinimix 5/20E at goal rate 70 mL per hour to provide 1680 mL total volume, 1478 calories, 84 g protein, 336 g dextrose and 3.2 mg/kg/min GIR (99% kcal and 100% protein needs met). 3. Provide 250 ml of 20% lipids twice a week (to accommodate lipid shortage) to provide 50 g lipids and 500 calories per day x2/ week. 4. Obtain Labs: Daily Phos,Mg,K+. Weekly TG recommended. 5. Pharmacy to adjust electrolytes, MVI and Trace Elements as appropriate. NUTRITION ASSESSMENT:  Nutritional summary & status:  RD consulted for TPN recs; listed above. Pt s/p laparoscopic subtotal cholecystectomy 8/6. Per chart post-op has been complicated by bile leak with nilious output from ZAYRA. No wt hx per EMR to assess wt loss. Pt reports poor po intakes \"forever. \" Pts son in room reports pt has had small bites here and there the past few days. RD to monitor po intakes, PN rate and tolerance. Patient admitted d/t acute cholecystitis    PMH significant for: HTN, CVA    MALNUTRITION ASSESSMENT  Context of Malnutrition: Acute Illness   Malnutrition Status:  Moderate malnutrition  Findings of the 6 clinical characteristics of malnutrition (Minimum of 2 out of 6 clinical characteristics is required to make the diagnosis of moderate or severe Protein Calorie Malnutrition based on AND/ASPEN Guidelines):  Energy Intake: Less than/equal to 50% of estimated energy requirements    Energy Intake Time: Greater than or equal to 5 days    Weight Loss %: Unable to assess    Weight loss Time: Unable to assess -no wt hx   Body Fat Loss: Unable to Assess   Body Fat Location: Unable to assess    Body Muscle Loss: Mild Loss    Body Muscle Loss Location: Clavicles  and Shoulders   Fluid Accumulation: Unable to assess    Fluid Accumulation Location: Unable to assess     Strength: Not Performed; Not Measured     Estimated Daily Nutrient Needs:  Energy (kcal):  3291-9946 kcal/d (20-25 kcal/kg); Weight Used for Energy Requirements:  Current     Protein (g):  73-85 g/d (1.2-1.4 g/kg); Weight Used for Protein Requirements:  Ideal (61 kg)        Fluid (ml/day):  0603-6380 ml/d; Method Used for Fluid Requirements:  1 ml/kcal      OBJECTIVE DATA: Significant to nutrition assessment  · Nutrition-Focused Physical Findings: Nutrition Related Findings: +2 non-pitting RUE edema; no bm yet noted   · Labs: Reviewed  · Meds: Reviewed; Phos  · Wounds: Wound Type: None     CURRENT NUTRITION THERAPIES  ADULT DIET; Regular; Low Fat (less than or equal to 50 gm/day)  PN-Adult Premix 5/15 - Standard Electrolytes - Central Line     PO Intake: 26-50% and 51-75%  PO Supplement Intake: None   IVF: n/a     ANTHROPOMETRICS  Current Height: 5' 7\" (170.2 cm)  Current Weight: 162 lb 14.7 oz (73.9 kg)    Admission weight: 161 lb 12.8 oz (73.4 kg)  Ideal Body Weight (lbs) (Calculated): 135 lbs (Ideal Body Weight (Kg) (Calculated): 61 kg)  Usual Bodyweight VINOD - limited wt hx    Weight Changes VINOD        BMI BMI (Calculated): 25.6    Wt Readings from Last 50 Encounters:   08/09/21 162 lb 14.7 oz (73.9 kg)   08/03/21 150 lb (68 kg)       Nutrition Diagnosis:   · Inadequate oral intake related to altered GI function as evidenced by nutrition support - parenteral nutrition      Nutrition Interventions:   Food and/or Nutrient Delivery:  Continue Current Diet, Start Parenteral Nutrition  Nutrition Education/Counseling:  No recommendation at this time   Coordination of Nutrition Care:  Continue to monitor while inpatient    Goals:  Pt will tolerate TPN to meet >75% of nutrition needs.        Nutrition Monitoring and Evaluation: Behavioral-Environmental Outcomes:  None Identified   Food/Nutrient Intake Outcomes:  Parenteral Nutrition Intake/Tolerance  Physical Signs/Symptoms Outcomes:  Biochemical Data, GI Status, Weight     Mp Malloy RD, [facility]: [phone]  Office:  [phone]

## 2021-08-10 NOTE — PLAN OF CARE
Problem: Nutrition  Goal: Optimal nutrition therapy  Note: Nutrition Problem #1: Inadequate oral intake  Intervention: Food and/or Nutrient Delivery: Continue Current Diet, Start Parenteral Nutrition  Nutritional Goals: Pt will tolerate TPN to meet >75% of nutrition needs.

## 2021-08-10 NOTE — PLAN OF CARE
Problem: Falls - Risk of:  Goal: Will remain free from falls  Description: Will remain free from falls  8/9/2021 2214 by Eduardo Raza RN  Outcome: Ongoing   Pt has non skid socks on, call light within reach, bed in lowest position with wheels locked, 2/4 side rails up, and bed alarm on.

## 2021-08-10 NOTE — PROGRESS NOTES
[facility] GI  Gastroenterology Progress Note  Valery Valera is a 80 y.o. female patient. 1. Acute cholecystitis        SUBJECTIVE:      Pt denies abdom pain. Physical    VITALS:  BP (!) 141/82   Pulse 112   Temp 98.5 °F (36.9 °C) (Oral)   Resp 16   Ht 5' 7\" (1.702 m)   Wt 162 lb 14.7 oz (73.9 kg)   SpO2 92%   BMI 25.52 kg/m²   TEMPERATURE:  Current - Temp: 98.5 °F (36.9 °C); Max - Temp  Av.2 °F (36.8 °C)  Min: 97 °F (36.1 °C)  Max: 98.6 °F (37 °C)    Abdomen soft, ND, NTTP, drain in place. no HSM, Bowel sounds normal     Data      Recent Labs     21  0601 21  0548 08/10/21  0530   WBC 13.3* 9.9 10.3   HGB 11.1* 11.7* 11.3*   HCT 31.9* 33.4* 32.2*   MCV 91.6 91.5 91.7    429 445     Recent Labs     21  1422 21  0548 08/10/21  0530    140 138   K 3.8 3.6 3.6    107 105   CO2 21 22 24   PHOS 3.2 2.4* 2.5   BUN 19 13 16   CREATININE 1.0 0.9 0.9     Recent Labs     21  0601 21  0548 08/10/21  0530   AST 19 19 23   ALT 12 11 15   BILIDIR <0.2 <0.2 <0.2   BILITOT 0.4 0.6 0.6   ALKPHOS 141* 143* 119     No results for input(s): LIPASE, AMYLASE in the last 72 hours. ASSESSMENT   1.. bile leak s/p subtotal cholecstectomy- ZAYRA drain output had been decreasing but an increase was noted and then output dropped again>>60>>200>>80. Wbc has normalized and pt denies any abdominal pain  2. Acute purulent cholecystitis. PLAN    1. Hold off on ERCP for now since wbc normalized, pt denies abdominal pain, and continue to trend biilary drain output.     2. Will follow       Pal Bergeron MD  [facility]

## 2021-08-10 NOTE — CONSULTS
Clinical Pharmacy Progress Note  Pharmacy to dose PN    Admit date: 8/5/2021     Subjective/Objective:  Pt is a 80yof with a PMHx that includes HTN & CVA who is admitted with acute cholecystitis, now s/p laparoscopic subtotal cholecystectomy (done 8/6). Post-op course has been complicated by bile leak with bilious output from ZAYRA. Interval update:  Biliary drainage is improving per notes. Starting parenteral nutrition today. PICC placed this AM.    Pharmacy is consulted to dose PN per Dr. Samantha Reed    Current antibiotics:  Zosyn 3.375g IV EI q8h (8/6-current) - day #5    Height:  5' 7\" (170.2 cm)  Weight: 162 lb 14.7 oz (73.9 kg)    Recent Labs     08/09/21  0548 08/10/21  0530    138   K 3.6 3.6    105   CO2 22 24   BUN 13 16   CREATININE 0.9 0.9   GLUCOSE 148* 117*       Calcium 7.6   Albumin 2.4   Corrected Calcium 8.9  Phosphorus 2.5  Magnesium 2.3      Recent Labs     08/09/21  0548 08/10/21  0530   WBC 9.9 10.3   HGB 11.7* 11.3*    445        8/16   Triglycerides ordered         Assessment/Plan:  1)  Parenteral Nutrition:  Pharmacy to dose  · Ordered Clinimix-E 5/15 at 41.7mL/hr (total volume = 1000mL/day) per day #1 standard formulation. Regimen provides AA 50g, Dextrose 150g, 710kcal per day. · RD consulted for ongoing recommendations re: goal rate / formulation and liipid recommendations in light of national shortage - appreciate dietitian recommendations. · Clinimix-E includes standard electrolyte formulation. No additional electrolytes added. Will monitor electrolytes daily and will replete with supplemental IVPBs as needed.  Glucose control:  Pt does not have h/o DM, and glucose on AM labs prior to starting TPN = 117. Will monitor for hyperglycemia as TPN is started / increased to goal.   Patient will receive MVI 10 mL/day on Mon-Wed-Fri only (due to national shortage) & Trace Elements 1 mL/day with PN daily.     Labs will be monitored daily and PN will be re-ordered daily.  Weekly triglyceride ordered (first 8/16) per PN protocol.     Please call with questions--  Thanks--  Ethel Painting, RossD, BCPS, BCGP  [id number] ([facility])   8/10/2021 11:44 AM

## 2021-08-10 NOTE — PROGRESS NOTES
[address] Daily Progress Note      Admit Date:  8/5/2021    CC: Follow up post op     Subjective:  Ms. Genie Larry denies chestpain. Objective:   /63   Pulse 106   Temp 98.4 °F (36.9 °C) (Oral)   Resp 16   Ht 5' 7\" (1.702 m)   Wt 162 lb 14.7 oz (73.9 kg)   SpO2 92%   BMI 25.52 kg/m²     Intake/Output Summary (Last 24 hours) at 8/10/2021 1315  Last data filed at 8/10/2021 1238  Gross per 24 hour   Intake 797.17 ml   Output 965 ml   Net -167.83 ml       TELEMETRY: Sinus    Physical Exam:  General:  Awake, alert, NAD  Eyes:  EOMI PERRL anicteric  Skin:  Warm and dry. Neck:  JVP normal  Chest:  Diminshed. No Rales. Cardiovascular:  RRR, Normal S1S2 No Murmur. No Rub. No Gallops. Abdomen:  Post op  Extremities:  No edema  Warm. Neuro:  CN II-XII intact. No focal deficits. No weakness. No lateralizing findings. Psych:  Normal thought and affect. MSK:  No Cyanosis nor Clubbing.   Symmetrical strength upper and lower extremities    Medications:    phosphorus  500 mg Oral TID    sodium chloride flush  5-40 mL Intravenous 2 times per day    enoxaparin  40 mg Subcutaneous Daily    atorvastatin  40 mg Oral Nightly    metoprolol tartrate  25 mg Oral BID    amLODIPine  10 mg Oral Daily    acetaminophen  1,000 mg Oral Q6H    piperacillin-tazobactam  3,375 mg Intravenous Q8H    sodium chloride flush  5-40 mL Intravenous 2 times per day      sodium chloride      PN-Adult Premix 5/15 - Standard Electrolytes - Central Line      sodium chloride       sodium chloride flush, sodium chloride, oxyCODONE **OR** oxyCODONE, sodium chloride flush, sodium chloride, ondansetron **OR** ondansetron    Lab Data:  CBC:   Recent Labs     08/08/21  0601 08/09/21  0548 08/10/21  0530   WBC 13.3* 9.9 10.3   HGB 11.1* 11.7* 11.3*   HCT 31.9* 33.4* 32.2*   MCV 91.6 91.5 91.7    429 445     BMP:   Recent Labs     08/08/21  1422 08/09/21  0548 08/10/21  0530    140 138   K 3.8 3.6 3.6    107 105   CO2 21 22 24   PHOS 3.2 2.4* 2.5   BUN 19 13 16   CREATININE 1.0 0.9 0.9     LIVER PROFILE:   Recent Labs     08/08/21  0601 08/09/21  0548 08/10/21  0530   AST 19 19 23   ALT 12 11 15   BILIDIR <0.2 <0.2 <0.2   BILITOT 0.4 0.6 0.6   ALKPHOS 141* 143* 119     PT/INR:   Recent Labs     08/10/21  0919   PROTIME 12.9*   INR 1.14*       Assessment:  Patient Active Problem List    Diagnosis Date Noted    Pre-op evaluation 08/07/2021    Acute calculous cholecystitis 08/05/2021    Carotid stenosis 02/28/2019    Cerebrovascular accident (CVA) of right thalamus ([facility] [address] 02/27/2019    Abnormal gait     Hypertensive urgency 02/26/2019       Plan:  1. CAD:  Cardiac status is stable post op. Will sign off presently and please call if cardiac questions arise.      Core Measures:  · Discharge instructions:   · LVEF documented:   · ACEI for LV dysfunction:   · Smoking Cessation:    Chitra Charles MD, MD 8/10/2021 1:15 PM

## 2021-08-10 NOTE — PROGRESS NOTES
Pt alert and oriented. VSS, pt slightly tachy with ambulation. Pt voiding freely. Pt denies pain. Pt has intermittent ABX infusing per orders, see MAR. Pt tolerating diet. Pt ZAYRA draining, see flowsheet. Pt resting comfortably in bed. Pt has call light within reach, bed in lowest position with wheels locked, 2/4 side rails up, and bed alarm on. Will continue to monitor.

## 2021-08-11 LAB
ALBUMIN SERPL-MCNC: 2.4 G/DL (ref 3.4–5)
ALBUMIN SERPL-MCNC: 2.4 G/DL (ref 3.4–5)
ALP BLD-CCNC: 120 U/L (ref 40–129)
ALT SERPL-CCNC: 16 U/L (ref 10–40)
ANION GAP SERPL CALCULATED.3IONS-SCNC: 8 MMOL/L (ref 3–16)
AST SERPL-CCNC: 23 U/L (ref 15–37)
BASOPHILS ABSOLUTE: 0 K/UL (ref 0–0.2)
BASOPHILS RELATIVE PERCENT: 0 %
BILIRUB SERPL-MCNC: 0.4 MG/DL (ref 0–1)
BILIRUBIN DIRECT: <0.2 MG/DL (ref 0–0.3)
BILIRUBIN, INDIRECT: ABNORMAL MG/DL (ref 0–1)
BUN BLDV-MCNC: 19 MG/DL (ref 7–20)
CALCIUM SERPL-MCNC: 7.7 MG/DL (ref 8.3–10.6)
CHLORIDE BLD-SCNC: 105 MMOL/L (ref 99–110)
CO2: 24 MMOL/L (ref 21–32)
CREAT SERPL-MCNC: 1 MG/DL (ref 0.6–1.2)
EOSINOPHILS ABSOLUTE: 0.1 K/UL (ref 0–0.6)
EOSINOPHILS RELATIVE PERCENT: 1 %
GFR AFRICAN AMERICAN: >60
GFR NON-AFRICAN AMERICAN: 52
GLUCOSE BLD-MCNC: 210 MG/DL (ref 70–99)
HCT VFR BLD CALC: 32.2 % (ref 36–48)
HEMOGLOBIN: 11.2 G/DL (ref 12–16)
LYMPHOCYTES ABSOLUTE: 1.2 K/UL (ref 1–5.1)
LYMPHOCYTES RELATIVE PERCENT: 12 %
MAGNESIUM: 2.3 MG/DL (ref 1.8–2.4)
MCH RBC QN AUTO: 31.8 PG (ref 26–34)
MCHC RBC AUTO-ENTMCNC: 34.8 G/DL (ref 31–36)
MCV RBC AUTO: 91.4 FL (ref 80–100)
MONOCYTES ABSOLUTE: 0.3 K/UL (ref 0–1.3)
MONOCYTES RELATIVE PERCENT: 3 %
NEUTROPHILS ABSOLUTE: 8.4 K/UL (ref 1.7–7.7)
NEUTROPHILS RELATIVE PERCENT: 84 %
PDW BLD-RTO: 15.2 % (ref 12.4–15.4)
PHOSPHORUS: 2.5 MG/DL (ref 2.5–4.9)
PLATELET # BLD: 470 K/UL (ref 135–450)
PMV BLD AUTO: 7.4 FL (ref 5–10.5)
POTASSIUM SERPL-SCNC: 3.2 MMOL/L (ref 3.5–5.1)
RBC # BLD: 3.52 M/UL (ref 4–5.2)
SODIUM BLD-SCNC: 137 MMOL/L (ref 136–145)
TOTAL PROTEIN: 5.7 G/DL (ref 6.4–8.2)
WBC # BLD: 10 K/UL (ref 4–11)

## 2021-08-11 PROCEDURE — 97116 GAIT TRAINING THERAPY: CPT

## 2021-08-11 PROCEDURE — 97530 THERAPEUTIC ACTIVITIES: CPT

## 2021-08-11 PROCEDURE — 2580000003 HC RX 258: Performed by: STUDENT IN AN ORGANIZED HEALTH CARE EDUCATION/TRAINING PROGRAM

## 2021-08-11 PROCEDURE — 6360000002 HC RX W HCPCS: Performed by: STUDENT IN AN ORGANIZED HEALTH CARE EDUCATION/TRAINING PROGRAM

## 2021-08-11 PROCEDURE — 2500000003 HC RX 250 WO HCPCS

## 2021-08-11 PROCEDURE — 97162 PT EVAL MOD COMPLEX 30 MIN: CPT

## 2021-08-11 PROCEDURE — 85025 COMPLETE CBC W/AUTO DIFF WBC: CPT

## 2021-08-11 PROCEDURE — 83735 ASSAY OF MAGNESIUM: CPT

## 2021-08-11 PROCEDURE — 80069 RENAL FUNCTION PANEL: CPT

## 2021-08-11 PROCEDURE — 6360000002 HC RX W HCPCS

## 2021-08-11 PROCEDURE — 6370000000 HC RX 637 (ALT 250 FOR IP): Performed by: STUDENT IN AN ORGANIZED HEALTH CARE EDUCATION/TRAINING PROGRAM

## 2021-08-11 PROCEDURE — 97166 OT EVAL MOD COMPLEX 45 MIN: CPT

## 2021-08-11 PROCEDURE — 1200000000 HC SEMI PRIVATE

## 2021-08-11 PROCEDURE — 80076 HEPATIC FUNCTION PANEL: CPT

## 2021-08-11 PROCEDURE — 2580000003 HC RX 258

## 2021-08-11 PROCEDURE — 97535 SELF CARE MNGMENT TRAINING: CPT

## 2021-08-11 PROCEDURE — 36415 COLL VENOUS BLD VENIPUNCTURE: CPT

## 2021-08-11 RX ORDER — DEXTROSE MONOHYDRATE 50 MG/ML
100 INJECTION, SOLUTION INTRAVENOUS PRN
Status: DISCONTINUED | OUTPATIENT
Start: 2021-08-11 | End: 2021-08-11

## 2021-08-11 RX ORDER — DEXTROSE MONOHYDRATE 25 G/50ML
12.5 INJECTION, SOLUTION INTRAVENOUS PRN
Status: DISCONTINUED | OUTPATIENT
Start: 2021-08-11 | End: 2021-08-11

## 2021-08-11 RX ORDER — NICOTINE POLACRILEX 4 MG
15 LOZENGE BUCCAL PRN
Status: DISCONTINUED | OUTPATIENT
Start: 2021-08-11 | End: 2021-08-11

## 2021-08-11 RX ORDER — POTASSIUM CHLORIDE 7.45 MG/ML
10 INJECTION INTRAVENOUS
Status: COMPLETED | OUTPATIENT
Start: 2021-08-11 | End: 2021-08-11

## 2021-08-11 RX ADMIN — POTASSIUM CHLORIDE 10 MEQ: 7.46 INJECTION, SOLUTION INTRAVENOUS at 13:26

## 2021-08-11 RX ADMIN — PIPERACILLIN AND TAZOBACTAM 3375 MG: 3; .375 INJECTION, POWDER, LYOPHILIZED, FOR SOLUTION INTRAVENOUS at 11:18

## 2021-08-11 RX ADMIN — POTASSIUM CHLORIDE 10 MEQ: 7.46 INJECTION, SOLUTION INTRAVENOUS at 14:28

## 2021-08-11 RX ADMIN — METOPROLOL TARTRATE 25 MG: 25 TABLET, FILM COATED ORAL at 20:26

## 2021-08-11 RX ADMIN — Medication 5 ML: at 08:38

## 2021-08-11 RX ADMIN — POTASSIUM CHLORIDE 10 MEQ: 7.46 INJECTION, SOLUTION INTRAVENOUS at 11:19

## 2021-08-11 RX ADMIN — Medication 10 ML: at 08:38

## 2021-08-11 RX ADMIN — ONDANSETRON 4 MG: 2 INJECTION INTRAMUSCULAR; INTRAVENOUS at 08:32

## 2021-08-11 RX ADMIN — PIPERACILLIN AND TAZOBACTAM 3375 MG: 3; .375 INJECTION, POWDER, LYOPHILIZED, FOR SOLUTION INTRAVENOUS at 18:42

## 2021-08-11 RX ADMIN — AMLODIPINE BESYLATE 10 MG: 10 TABLET ORAL at 08:36

## 2021-08-11 RX ADMIN — ENOXAPARIN SODIUM 40 MG: 40 INJECTION SUBCUTANEOUS at 11:20

## 2021-08-11 RX ADMIN — ASCORBIC ACID, VITAMIN A PALMITATE, CHOLECALCIFEROL, THIAMINE HYDROCHLORIDE, RIBOFLAVIN-5 PHOSPHATE SODIUM, PYRIDOXINE HYDROCHLORIDE, NIACINAMIDE, DEXPANTHENOL, ALPHA-TOCOPHEROL ACETATE, VITAMIN K1, FOLIC ACID, BIOTIN, CYANOCOBALAMIN: 200; 3300; 200; 6; 3.6; 6; 40; 15; 10; 150; 600; 60; 5 INJECTION, SOLUTION INTRAVENOUS at 18:49

## 2021-08-11 RX ADMIN — PIPERACILLIN AND TAZOBACTAM 3375 MG: 3; .375 INJECTION, POWDER, LYOPHILIZED, FOR SOLUTION INTRAVENOUS at 03:23

## 2021-08-11 RX ADMIN — ATORVASTATIN CALCIUM 40 MG: 40 TABLET, FILM COATED ORAL at 20:26

## 2021-08-11 RX ADMIN — METOPROLOL TARTRATE 25 MG: 25 TABLET, FILM COATED ORAL at 08:36

## 2021-08-11 RX ADMIN — SODIUM PHOSPHATE, MONOBASIC, MONOHYDRATE 20 MMOL: 276; 142 INJECTION, SOLUTION INTRAVENOUS at 16:03

## 2021-08-11 RX ADMIN — POTASSIUM CHLORIDE 10 MEQ: 7.46 INJECTION, SOLUTION INTRAVENOUS at 12:12

## 2021-08-11 ASSESSMENT — PAIN SCALES - GENERAL
PAINLEVEL_OUTOF10: 0
PAINLEVEL_OUTOF10: 0

## 2021-08-11 NOTE — CARE COORDINATION
Cm following, spoke with pt and son at bedside, planned procedure tomorrow. They want to look at SNF [facility] with [address] [facility] skilled care. If TPN off they want HOME if with TPN will want SNF.   Electronically signed by Luisito Bond RN on 8/11/2021 at 3:22 PM  [phone]

## 2021-08-11 NOTE — PROGRESS NOTES
Physician Progress Note      Bhumika Schreiber  [facility] #:                  [id number]  :                       1932  ADMIT DATE:       2021 2:19 AM  DISCH DATE:  RESPONDING  PROVIDER #:        Estela Szymanski MD          QUERY TEXT:    Pt admitted with acute cholecystitis. Pt noted to have increase in creatinine. If possible, please document in the progress notes and discharge summary if   you are evaluating and/or treating any of the following: The medical record reflects the following:  Risk Factors: 81 yo w/ cholecystitis s/P lap luis armando  Clinical Indicators: Per ED: Creatinine slightly elevated from prior at 1.3   from baseline of less than 1.  8/6 creatinine 1.5. Treatment: lab monitoring, IVF    Defined by Kidney Disease Improving Global Outcomes (KDIGO) clinical practice   guideline for acute kidney injury:  -Increase in SCr by greater than or equal to 0.3 mg/dl within 48 hours; or  -Increase in SCr to greater than or equal to 1.5 times baseline, which is   known or presumed to have occurred within the prior 7 days; or  -Urine volume < 0.5ml/kg/h for 6 hours  Options provided:  -- Acute kidney failure  -- Other - I will add my own diagnosis  -- Disagree - Not applicable / Not valid  -- Disagree - Clinically unable to determine / Unknown  -- Refer to Clinical Documentation Reviewer    PROVIDER RESPONSE TEXT:    This patient is in Acute kidney failure.     Query created by: Dulce Gan on 2021 10:29 AM      Electronically signed by:  Estela Szymanski MD 2021 2:51 PM

## 2021-08-11 NOTE — PLAN OF CARE
Problem: Falls - Risk of:  Goal: Will remain free from falls  Description: Will remain free from falls  Outcome: Ongoing  Goal: Absence of physical injury  Description: Absence of physical injury  Outcome: Ongoing  Patient up with 1 person assist. PT/OT working with pt. Chair/bed alarm on. CLWR. Pt called out appropriately. Problem: Pain:  Goal: Pain level will decrease  Description: Pain level will decrease  Outcome: Ongoing  Goal: Control of acute pain  Description: Control of acute pain  Outcome: Ongoing  Goal: Control of chronic pain  Description: Control of chronic pain  Outcome: Ongoing  No complaints of pain at this time. PRN available if needed. Problem: Nutrition  Goal: Optimal nutrition therapy  Outcome: Ongoing  Patient with decreased intake. TPN continues through R PICC. Electrolytes replaced per order.

## 2021-08-11 NOTE — PROGRESS NOTES
Gen Surg    Discussed with Dr. Yanet Radford, plan for ERCP tomorrow.,  NPO at midnight, lovenox held after today. Called her son and spoke to Gt Kiser her son. Brother Nnacy Perez is here today will discuss with him directly, Gt Del Cidmissy asking to speak to son alone. He is in agreement with plan. He states she worries a lot, and he feels like minimal stimulation/panic will help with her sleep.       Macy Pollock MD   Gen Surg PGY 4  8/11/2021  10:24 AM  [phone]

## 2021-08-11 NOTE — PROGRESS NOTES
Physical Therapy    Facility/Department: [address] [place] [address] 5 [facility]  Initial Assessment/Treatment    NAME: Kristofer Castorena  : 1932  MRN: [id number]    Date of Service: 2021    Discharge Recommendations:  Kristofer Castorena scored a 15/24 on the AM-PAC short mobility form. Current research shows that an AM-PAC score of 18 or greater is typically associated with a discharge to the patient's home setting. Based on the patient's AM-PAC score and their current functional mobility deficits, it is recommended that the patient have 2-3 sessions per week of Physical Therapy at d/c to increase the patient's independence. At this time, this patient demonstrates the endurance and safety to discharge home with home services and a follow up treatment frequency of 2-3x/wk. Please see assessment section for further patient specific details. If patient discharges prior to next session this note will serve as a discharge summary. Please see below for the latest assessment towards goals. PT Equipment Recommendations  Equipment Needed: No    Assessment   Body structures, Functions, Activity limitations: Decreased functional mobility ; Decreased balance  Assessment: Pt is a 80 y.o. F who is s/p LAPAROSCOPIC CHOLECYSTECTOMY WITH INTRAOPERATIVE CHOLANGIOGRAM (). Today she was able to demos functional mobility and limited amb in room with SBA-CGA. She presents below her functional baseline as she is normally indep with functional mobility and amb of household distances with use of rollator. She plans to return home with 24hr assist from her 2 sons. Pt expresses that she would like a home health aide to assist with ADLs. Recommend use of rollator or % of the time. Pt would benefit from further IP PT while here to improve activity tolerance and functional mobility. Will follow.   Treatment Diagnosis: decreased functional mobility  Decision Making: Medium Complexity  PT Education: PT Role;General Safety  Patient Education: Pt educated on PT role, call for nurse when getting up, proper placement of UEs with sit<>stand trans with RW; use of rollator or % of the time while here and when d/c'd, sons to provide 24hr assist, and education on role of home health aide - pt verbalized understanding. REQUIRES PT FOLLOW UP: Yes  Activity Tolerance  Activity Tolerance: Patient limited by fatigue       Patient Diagnosis(es): The encounter diagnosis was Acute cholecystitis. has a past medical history of CVA (cerebral vascular accident) ([address] and Hypertension. has a past surgical history that includes Cholecystectomy, laparoscopic (N/A, 2021). Restrictions  Restrictions/Precautions  Restrictions/Precautions: Up as Tolerated  Position Activity Restriction  Other position/activity restrictions: up as tolerated  Vision/Hearing  Vision: Impaired  Vision Exceptions: Wears glasses for reading  Hearing: Exceptions to [facility]  Hearing Exceptions: Bilateral hearing aid;Hard of hearing/hearing concerns     Subjective  General  Chart Reviewed: Yes  Patient assessed for rehabilitation services?: Yes  Additional Pertinent Hx: Admit , Abd/pelvic CT: Probable acute cholecystitis with cholelithiasis; to OR  for Subtotal cholecystectomy, laparoscopic; Post-operative course complicated with bile leak evidenced by bilious output from ZAYRA; PMHx: CVA, HTN  Family / Caregiver Present: Yes (Son)  Referring Practitioner: Avani Gallegos DO  Referral Date : 08/10/21  Diagnosis: acute cholecystitis  Follows Commands: Within Functional Limits  Subjective  Subjective: Pt found seated in chair. Pt is agreeable to PT but requires lots of encouragement. Pt states she is tired from going to the bathroom recently. Pt presents with limited verbalization throughout session. Pain Screening  Patient Currently in Pain: Denies     Orientation  Orientation  Overall Orientation Status: Within Functional Limits (Oriented to self, , place, month, and year.  Disoriented to date.)  Social/Functional History  Social/Functional History  Lives With: Son  Type of Home: House  Home Layout: Two level, Able to Live on Main level with bedroom/bathroom (Pt's bedroom is on 2nd floor)  Home Access: Stairs to enter with rails  Entrance Stairs - Number of Steps: 5  Entrance Stairs - Rails: Right  Bathroom Shower/Tub: Tub/Shower unit, Walk-in shower (pt uses tub shower on 2nd floor)  Bathroom Toilet: Handicap height  Bathroom Equipment: Grab bars in shower, Grab bars around toilet  Bathroom Accessibility: Walker accessible  Home Equipment: Rolling walker, 4 wheeled walker, Hospital bed (chair lift on stair to 2nd floor)  Receives Help From: Family  ADL Assistance: [address] [place] [address]:  (Sons take care of everything)  Homemaking Responsibilities: No  Ambulation Assistance: Independent  Transfer Assistance: Independent  Active : No  Patient's  Info: Sons take care of transportation  Occupation: Retired  Additional Comments: Pt reports no falls in last 6 months. Able to live on main level the first couple of days if need. Has rollator for the first floor that she uses then uses the RW on the second floor after using chair lift to get up there. Pt's normal bedrrom is on second floor however bedroom on first floor has a walk-in shower and bathroom is closer to bed. Both rooms have hospital beds. Objective        AROM RLE (degrees)  RLE AROM: WFL  AROM LLE (degrees)  LLE AROM : WFL  Strength RLE  Strength RLE: WFL  Comment: Hip flexion: able to perform march (no resistance added d/t recent sx); Knee extension: 5/5; Ankle DF: 5/5  Strength LLE  Strength LLE: WFL  Comment: Hip flexion: able to perform march (no resistance added d/t recent sx); Knee extension: 5/5; Ankle DF: 5/5        Bed mobility  Comment: Not observed. Pt already in chair.   Transfers  Sit to Stand: Contact guard assistance (from chair)  Stand to sit: Stand by assistance (to chair)  Comment: Pt requires max VCs for UE placement with sit<>stand trans as she is used to using rollator instead. Ambulation  Ambulation?: Yes  Ambulation 1  Surface: level tile  Device: Rolling Walker  Assistance: Stand by assistance  Quality of Gait: Demos significant forward flexed posture with inability to correct. Fairly steady gait overall. Gait Deviations: Increased ROSALINDA; Decreased step length;Decreased step height;Slow Bryanna  Distance: 30 ft within room  Comments: Distance limited d/t fatigue     Balance  Sitting - Static:  (Supervision)  Sitting - Dynamic:  (SBA)  Standing - Static:  (SBA)  Standing - Dynamic:  (SBA)      Treatment included functional mobility training.     Plan   Plan  Times per week: 2-5  Current Treatment Recommendations: Strengthening, Endurance Training, Transfer Training, Patient/Caregiver Education & Training, Balance Training, Gait Training, Functional Mobility Training, Stair training  Safety Devices  Type of devices: Left in chair, Call light within reach, Chair alarm in place, Gait belt, Nurse notified    AM-PAC Score  AM-PAC Inpatient Mobility Raw Score : 15 (08/11/21 1410)  AM-PAC Inpatient T-Scale Score : 39.45 (08/11/21 1410)  Mobility Inpatient CMS 0-100% Score: 57.7 (08/11/21 1410)  Mobility Inpatient CMS G-Code Modifier : CK (08/11/21 1410)        Goals  Short term goals  Time Frame for Short term goals: d/c  Short term goal 1: Pt will demos sit<>stand with supervision and proper placement of UEs using RW  Short term goal 2: Pt will demos amb of 50 ft with supervision and use of RW  Short term goal 3: Pt will demos ascent/descent of 5 steps with rails and SBA  Patient Goals   Patient goals : not stated     Therapy Time   Individual Concurrent Group Co-treatment   Time In 1145         Time Out 1226         Minutes 41               Timed Code Treatment Minutes:  26     Total Treatment Minutes:  41     If patient is discharged prior to next treatment, this note will serve as the discharge summary. Elmira Keys, SHAUNNA    Therapist was present, directed the patient's care, made skilled judgment, and was responsible for assessment and treatment of the patient.     Tony Kwan, PT, DPT  [phone]

## 2021-08-11 NOTE — PLAN OF CARE
Problem: Skin Integrity:  Intervention: Incontinence management  Note: PT assisted with kaci care, depends changed, zinc ointment applied to skin. PT encouraged to turn side to side shift weigh while in bed. Pt ambulates to restroom frequently.

## 2021-08-11 NOTE — PROGRESS NOTES
Colorectal Surgery   Daily Progress Note  Jasvir Mckeon  CC:  Symptomatic cholelitiasis    SUBJECTIVE:  Patient rested well overnight. Denies any abdominal pain. Still with minimal appetite. ROS:   A 14 point review of systems was conducted, significant findings as noted in HPI. All other systems negative. OBJECTIVE:   Infusions:   sodium chloride      PN-Adult Premix 5/15 - Standard Electrolytes - Central Line 41.7 mL/hr at 08/11/21 0153    sodium chloride          I/O:I/O last 3 completed shifts: In: 150 [P.O.:150]  Out: 1000 [Urine:500; Drains:500]           Wt Readings from Last 1 Encounters:   08/09/21 162 lb 14.7 oz (73.9 kg)     LABS:    Recent Labs     08/10/21  0530 08/11/21  0512   WBC 10.3 10.0   HGB 11.3* 11.2*   HCT 32.2* 32.2*   MCV 91.7 91.4    470*     Recent Labs     08/10/21  0530 08/11/21  0512    137   K 3.6 3.2*    105   CO2 24 24   PHOS 2.5 2.5   BUN 16 19   CREATININE 0.9 1.0     Recent Labs     08/09/21  0548 08/10/21  0530   AST 19 23   ALT 11 15   BILIDIR <0.2 <0.2   BILITOT 0.6 0.6   ALKPHOS 143* 119      No results for input(s): LIPASE, AMYLASE in the last 72 hours. Recent Labs     08/09/21  0548 08/10/21  0530 08/10/21  0919   PROT 6.1* 5.8*  --    INR  --   --  1.14*      No results for input(s): CKTOTAL, CKMB, CKMBINDEX, TROPONINI in the last 72 hours.          Exam  /81   Pulse 95   Temp 97.7 °F (36.5 °C) (Oral)   Resp 16   Ht 5' 7\" (1.702 m)   Wt 162 lb 14.7 oz (73.9 kg)   SpO2 92%   BMI 25.52 kg/m²      General Appearance: alert, appears stated age and cooperative  Cardiovascular: RRR, brisk capillary refill  Abdomen: Soft, appropriately-tender, incisions c/d/i and well approximated with Dermabond, ZAYRA drain in place with bilious output  Skin: warm and dry, no rashes  Extremities: no edema, no cyanosis  Neuro: A&Ox3, no focal deficits, sensation intact    ASSESSMENT/PLAN:   Pt. is a 80 y.o. female s/p laparoscopic subtotal cholecystectomy (8/6). POD5.    - ZAYRA drain with over 400cc out in 24 hours. Will discuss with GI.  Need for ERCP vs MRCP to further image biliary leak  - Continue ABx  - Continue low fat diet  - PT/OT consult  - Continue TPN    Deanna Vitale DO  PGY1, General Surgery  08/11/21  6:23 AM  [phone]

## 2021-08-11 NOTE — PROGRESS NOTES
Clinical Pharmacy Progress Note  Pharmacy to dose PN    Admit date: 8/5/2021     Subjective/Objective:  Pt is a 80yof with a PMHx that includes HTN & CVA who is admitted with acute cholecystitis, now s/p laparoscopic subtotal cholecystectomy (done 8/6). Post-op course has been complicated by bile leak with bilious output from ZAYRA. Interval update: ZAYRA drain with > 400 mL out in 24 hrs per Surgery. Parenteral nutrition started yesterday. Low-fat diet ordered, but minimal appetite. Pharmacy is consulted to dose PN per Dr. Jaime Bishop    Current antibiotics:  Zosyn 3.375g IV EI q8h (8/6-current) - day #6    Height:  5' 7\" (170.2 cm)  Weight: 162 lb 14.7 oz (73.9 kg)    Recent Labs     08/10/21  0530 08/11/21  0512    137   K 3.6 3.2*    105   CO2 24 24   BUN 16 19   CREATININE 0.9 1.0   GLUCOSE 117* 210*       Calcium 7.7   Albumin 2.4   Corrected Calcium 8.4  Phosphorus 2.5  Magnesium 2.3      Recent Labs     08/10/21  0530 08/11/21  0512   WBC 10.3 10.0   HGB 11.3* 11.2*    470*        8/16   Triglycerides ordered         Assessment/Plan:  1)  Parenteral Nutrition: Pharmacy to dose  · Ordered Clinimix-E 5/15 at 70 mL/hr (total volume = 1,680 mL/day) per day #2 standard formulation. Regimen provides AA 84g, Dextrose 252g, 1193 kcal per day. · RD consulted for ongoing recommendations re: goal rate / formulation and lipid recommendations in light of national shortage - appreciate Clinical Dietician's recommendations. · Clinimix-E includes standard electrolyte formulation. No additional electrolytes added. Will monitor electrolytes daily and will replete with supplemental IVPBs as needed. Pt will receive the following repletion today outside of the TPN:  · KCl 10 mEq IVPB x 4 doses (40 mEq total)   Glucose control: Pt does not have h/o DM. Glucose on AM labs elevated at 210 mg/dL. Discussed with Surgery. Team holding off on correctional insulin for the time being.    Patient will receive MVI 10 mL/day on Mon-Wed-Fri only (due to national shortage) & Trace Elements 1 mL/day with PN daily.  Labs will be monitored daily and PN will be re-ordered daily. Weekly triglyceride ordered (first 8/16) per PN protocol. Thank you for allowing us to participate in the care of this patient. Please reach out with any questions.     Emiliano Abebe, [place], BCPS  8/11/2021  Wireless: [phone]

## 2021-08-11 NOTE — PROGRESS NOTES
PT with large amount old drainage from PICC line insertion site. Insertion site cleansed, dressing replaced. No further signs of bleeding.

## 2021-08-11 NOTE — PROGRESS NOTES
[facility] GI  Gastroenterology Progress Note    Vidal Stephens is a 80 y.o. female patient. Active Problems:    Acute calculous cholecystitis    Pre-op evaluation  Resolved Problems:    * No resolved hospital problems. *      SUBJECTIVE:      Pt denies AP. No fever     ROS:    Gastrointestinal ROS: no abdominal pain, change in bowel habits, or black or bloody stools    Physical    VITALS:  BP (!) 152/86   Pulse 126   Temp 97.6 °F (36.4 °C) (Oral)   Resp 16   Ht 5' 7\" (1.702 m)   Wt 162 lb 14.7 oz (73.9 kg)   SpO2 92%   BMI 25.52 kg/m²   TEMPERATURE:  Current - Temp: 97.6 °F (36.4 °C); Max - Temp  Av.9 °F (36.6 °C)  Min: 97.6 °F (36.4 °C)  Max: 98.5 °F (36.9 °C)    NAD  Abdomen soft, ND, NT, no HSM, Bowel sounds normal. Drain in place  Integ: no jaundice    Data    Data Review:    Recent Labs     21  0548 08/10/21  0530 21  0512   WBC 9.9 10.3 10.0   HGB 11.7* 11.3* 11.2*   HCT 33.4* 32.2* 32.2*   MCV 91.5 91.7 91.4    445 470*     Recent Labs     21  0548 08/10/21  0530 21  0512    138 137   K 3.6 3.6 3.2*    105 105   CO2 22 24 24   PHOS 2.4* 2.5 2.5   BUN 13 16 19   CREATININE 0.9 0.9 1.0     Recent Labs     21  0548 08/10/21  0530 21  0512   AST 19 23 23   ALT 11 15 16   BILIDIR <0.2 <0.2 <0.2   BILITOT 0.6 0.6 0.4   ALKPHOS 143* 119 120     No results for input(s): LIPASE, AMYLASE in the last 72 hours. Recent Labs     08/10/21  0919   PROTIME 12.9*   INR 1.14*     No results for input(s): PTT in the last 72 hours. ASSESSMENT   1. .. bile leak s/p subtotal cholecstectomy- ZAYRA drain output had been decreasing but now increasing. 24h outpt 21 was 195cc and then increased to 520cc yest.    2. Acute purulent cholecystitis s/p subtotal cholecystectomy .           PLAN    1. Case discussed with the son and surgery team.  Will plan on ERCP on 21 for stent placement.    2. NPO p MN     Yo Fulton MD  [facility]

## 2021-08-11 NOTE — PROGRESS NOTES
Occupational Therapy   Occupational Therapy Initial Assessment/Tx  Date: 2021   Patient Name: Marielena Shearer  MRN: [id number]     : 1932    Date of Service: 2021    Discharge Recommendations: Marielena Shearer scored a 19/24 on the AM-PAC ADL Inpatient form. Current research shows that an AM-PAC score of 18 or greater is typically associated with a discharge to the patient's home setting. Based on the patient's AM-PAC score, and their current ADL deficits, it is recommended that the patient have 2-3 sessions per week of Occupational Therapy at d/c to increase the patient's independence. At this time, this patient demonstrates the endurance and safety to discharge home with 24hr assistance and HHOT and a follow up treatment frequency of 2-3x/wk. Please see assessment section for further patient specific details. If patient discharges prior to next session this note will serve as a discharge summary. Please see below for the latest assessment towards goals. HOME HEALTH CARE: LEVEL 1 STANDARD    - Initial home health evaluation to occur within 24-48 hours, in patient home   - Therapy to evaluate with goal of regaining prior level of functioning   - Therapy to evaluate if patient has [address] needs for personal care    OT Equipment Recommendations  Equipment Needed: No  Other: Defer to next level of care    Assessment   Performance deficits / Impairments: Decreased functional mobility ; Decreased ADL status; Decreased ROM; Decreased strength;Decreased endurance;Decreased safe awareness;Decreased posture;Decreased coordination;Decreased fine motor control  Assessment: Pt is 79 yo female presenting from home with her 2 sons to [facility] with c/o abdominal pain 2/2 cholecystities and cholelithiasis. Pt appears to be functioning below baseline requiring max A for LB dressing and min A for toileting tasks and bed mobility.  Pt required CGA for sit<>stand transfers and SBA for functional mobility with gallbladder stone and inflammation, stone fragments removed and gallbaldder aspirated. Cardiology consult= pt stable. Gastroenterology consult= monitor ZAYRA drain output, possible need for ERCP with plastic biliary stent. Abdominal/Pelvic CT= probable acute cholecystitis with cholelithiasis; Chest XR= mild pulmonary vascular cocngestion and R basilar atectasis; Chest XR= R arm PICC terminated within cavoatrial junction, appropriate position, mild basilar pleural and parenchymal disease. PMHx= CVA and HTN. No surgical Hx on fle. Family / Caregiver Present: Yes (Pt's son present throughout session)  Referring Practitioner: Terell Barroso DO  Diagnosis: Acute cholecystitis  Subjective  Subjective: Pt reclined supin bed upon OT arrival. Pt agreeable to OT evaluation. Pt reported she was nauseous and had just received nausea medicine from RN. Patient Currently in Pain: No    Social/Functional History  Social/Functional History  Lives With: Son  Type of Home: House  Home Layout: Two level (Pt's bedroom is on 2nd floor)  Home Access: Stairs to enter with rails  Entrance Stairs - Number of Steps: 5  Entrance Stairs - Rails: Right  Bathroom Shower/Tub: Tub/Shower unit, Walk-in shower (pt uses tub shower on 2nd floor)  Bathroom Toilet: Handicap height  Bathroom Equipment: Grab bars in shower, Grab bars around toilet  Bathroom Accessibility: Walker accessible  Home Equipment: Rolling walker (chair lift on stair to 2nd floor)  Receives Help From: Family  ADL Assistance: [address]:  (Sons take care of everything)  Homemaking Responsibilities: No  Ambulation Assistance: Independent  Transfer Assistance: Independent  Active : No  Patient's  Info:  Sons take care of transportation  Occupation: Retired  Additional Comments: Pt reports no falls in last 6 months       Objective   Vision: Within Functional Limits (wears glasses occasionally for reading)  Hearing: Exceptions to [address] Exceptions: Bilateral hearing aid;Hard of hearing/hearing concerns    Orientation  Overall Orientation Status: Impaired  Orientation Level: Oriented to place;Oriented to person;Disoriented to situation;Disoriented to time     Balance  Sitting Balance: Stand by assistance (at EOB with bed flat)  Standing Balance: Stand by assistance (with 2WW)  Standing Balance  Time: ~12 minutes  Activity: kaci care at toilet, grooming at sink, and functional mobility to/from bathroom  Comment: Pt required VC to keep her head up and look forward. Functional Mobility  Functional - Mobility Device: Rolling Walker  Activity: To/from bathroom  Assist Level: Stand by assistance  Functional Mobility Comments: Pt slow and effortful with flexed posture at shoulders and trunk. Pt required VC to keep her head up and look forward. Toilet Transfers  Toilet - Technique: Ambulating (with 2WW)  Equipment Used: Standard toilet (use of grab bar on L)  Toilet Transfer: Stand by assistance  ADL  Grooming: Stand by assistance (standing at sink with 2WW for hand hygiene, oral hygiene, and washing face)  LE Dressing: Maximum assistance (Pt reported she was unable to don/doff socks while seated at EOB; Pt able to raise and lower brief while standing at toilet with 2WW and SBA, but required max A to thread BLE into it)  Toileting: Minimal assistance (Pt able to complete brief management and a little bit of kaci care. Pt was incontinent of bowel and did not want to wipe her buttocks. Therapist completed kaci care while pt stood with 2WW.)  Additional Comments: Pt and her sons report pt being able to complete ADLs independently at home. Unclear if pt has new deficits or is self limiting. Bed mobility  Rolling to Left: Stand by assistance (HOB raised and use of bed rails)  Supine to Sit: Minimal assistance (HOB raised and use of rails;  Pt required therapist to help move trunk into seated position)  Scooting: Stand by assistance (to EOB with bed flat)  Comment: Pt slow and effortful, saying \"I can't do it, I need help\"  Transfers  Sit to stand: Contact guard assistance (from EOB)  Stand to sit: Contact guard assistance (to chair)     Cognition  Overall Cognitive Status: Exceptions  Following Commands: Follows one step commands with increased time  Attention Span: Attends with cues to redirect  Safety Judgement: Decreased awareness of need for assistance (Pt seems to believe she needs more assistance than is necessary)  Insights: Decreased awareness of deficits (Pt appears to believe she has more deficits than she actually presents with)  Initiation: Requires cues for all  Cognition Comment: Pt reports she is independent at baseline but now is asking for help with tasks, requiring max encouragement to try and complete them. Once pt begins a task she is able to complete it.                  LUE AROM (degrees)  LUE AROM : Exceptions  LUE General AROM: Pt's son reports baseline deficits in AROM in LUE  L Shoulder Flexion 0-180: 0-90  Left Hand AROM (degrees)  Left Hand AROM: WFL  RUE AROM (degrees)  RUE AROM : WFL  Right Hand AROM (degrees)  Right Hand AROM: WFL  LUE Strength  Gross LUE Strength: WFL  RUE Strength  Gross RUE Strength: WFL     Hand Dominance  Hand Dominance: Right     Pt participated in OT eval and tx including ADLs and transfer          Plan   Plan  Times per week: 2-5  Times per day: Daily  Current Treatment Recommendations: ROM, Strengthening, Endurance Training, Functional Mobility Training, Balance Training, Safety Education & Training, Patient/Caregiver Education & Training, Equipment Evaluation, Education, & procurement, Self-Care / ADL    G-Code     OutComes Score                                                  AM-PAC Score        AM-PAC Inpatient Daily Activity Raw Score: 19 (08/11/21 1302)  AM-PAC Inpatient ADL T-Scale Score : 40.22 (08/11/21 1302)  ADL Inpatient CMS 0-100% Score: 42.8 (08/11/21 1302)  ADL Inpatient CMS G-Code Modifier : CK (08/11/21 1302)    Goals  Short term goals  Time Frame for Short term goals: By discharge  Short term goal 1: Pt will CGA to complete toileting tasks  Short term goal 2: Pt will require mod A to complete LB dressing  Short term goal 3: Pt will require SBA for sit<>stand transfers  Patient Goals   Patient goals : To go home       Therapy Time   Individual Concurrent Group Co-treatment   Time In 0850         Time Out 0945         Minutes 55         Timed Code Treatment Minutes: 40 Minutes (+15 min OT eval)       Sandi Germain S/OT  Stef Warren OTR/L [id number]  Therapist was present, directed the patient's care, made skilled judgement, was responsible for assessment and treatment of the patient.

## 2021-08-12 ENCOUNTER — ANESTHESIA EVENT (OUTPATIENT)
Dept: ENDOSCOPY | Age: 86
DRG: 418 | End: 2021-08-12
Payer: MEDICARE

## 2021-08-12 ENCOUNTER — APPOINTMENT (OUTPATIENT)
Dept: GENERAL RADIOLOGY | Age: 86
DRG: 418 | End: 2021-08-12
Payer: MEDICARE

## 2021-08-12 ENCOUNTER — ANESTHESIA (OUTPATIENT)
Dept: ENDOSCOPY | Age: 86
DRG: 418 | End: 2021-08-12
Payer: MEDICARE

## 2021-08-12 VITALS — OXYGEN SATURATION: 100 % | SYSTOLIC BLOOD PRESSURE: 83 MMHG | DIASTOLIC BLOOD PRESSURE: 50 MMHG

## 2021-08-12 LAB
ALBUMIN SERPL-MCNC: 2.5 G/DL (ref 3.4–5)
ANION GAP SERPL CALCULATED.3IONS-SCNC: 10 MMOL/L (ref 3–16)
BUN BLDV-MCNC: 19 MG/DL (ref 7–20)
CALCIUM SERPL-MCNC: 7.8 MG/DL (ref 8.3–10.6)
CHLORIDE BLD-SCNC: 102 MMOL/L (ref 99–110)
CO2: 24 MMOL/L (ref 21–32)
CREAT SERPL-MCNC: 0.9 MG/DL (ref 0.6–1.2)
GFR AFRICAN AMERICAN: >60
GFR NON-AFRICAN AMERICAN: 59
GLUCOSE BLD-MCNC: 183 MG/DL (ref 70–99)
GLUCOSE BLD-MCNC: 188 MG/DL (ref 70–99)
GLUCOSE BLD-MCNC: 237 MG/DL (ref 70–99)
MAGNESIUM: 2.1 MG/DL (ref 1.8–2.4)
PERFORMED ON: ABNORMAL
PERFORMED ON: ABNORMAL
PHOSPHORUS: 2.6 MG/DL (ref 2.5–4.9)
POTASSIUM SERPL-SCNC: 3.4 MMOL/L (ref 3.5–5.1)
SODIUM BLD-SCNC: 136 MMOL/L (ref 136–145)

## 2021-08-12 PROCEDURE — 3609018800 HC ERCP DX COLLECTION SPECIMEN BRUSHING/WASHING: Performed by: INTERNAL MEDICINE

## 2021-08-12 PROCEDURE — C2625 STENT, NON-COR, TEM W/DEL SY: HCPCS | Performed by: INTERNAL MEDICINE

## 2021-08-12 PROCEDURE — 80069 RENAL FUNCTION PANEL: CPT

## 2021-08-12 PROCEDURE — 2709999900 HC NON-CHARGEABLE SUPPLY: Performed by: INTERNAL MEDICINE

## 2021-08-12 PROCEDURE — 2500000003 HC RX 250 WO HCPCS: Performed by: NURSE ANESTHETIST, CERTIFIED REGISTERED

## 2021-08-12 PROCEDURE — 2580000003 HC RX 258: Performed by: STUDENT IN AN ORGANIZED HEALTH CARE EDUCATION/TRAINING PROGRAM

## 2021-08-12 PROCEDURE — 2580000003 HC RX 258: Performed by: NURSE ANESTHETIST, CERTIFIED REGISTERED

## 2021-08-12 PROCEDURE — 1200000000 HC SEMI PRIVATE

## 2021-08-12 PROCEDURE — 3700000001 HC ADD 15 MINUTES (ANESTHESIA): Performed by: INTERNAL MEDICINE

## 2021-08-12 PROCEDURE — 2720000010 HC SURG SUPPLY STERILE: Performed by: INTERNAL MEDICINE

## 2021-08-12 PROCEDURE — 6360000002 HC RX W HCPCS: Performed by: NURSE ANESTHETIST, CERTIFIED REGISTERED

## 2021-08-12 PROCEDURE — 2500000003 HC RX 250 WO HCPCS: Performed by: STUDENT IN AN ORGANIZED HEALTH CARE EDUCATION/TRAINING PROGRAM

## 2021-08-12 PROCEDURE — 3609014300 HC ERCP BALLOON DILATE BILIARY/PANC DUCT/AMPULLA EA: Performed by: INTERNAL MEDICINE

## 2021-08-12 PROCEDURE — 3609015100 HC ERCP STENT PLACEMENT BILIARY/PANCREATIC DUCT: Performed by: INTERNAL MEDICINE

## 2021-08-12 PROCEDURE — 0F778DZ DILATION OF COMMON HEPATIC DUCT WITH INTRALUMINAL DEVICE, VIA NATURAL OR ARTIFICIAL OPENING ENDOSCOPIC: ICD-10-PCS | Performed by: INTERNAL MEDICINE

## 2021-08-12 PROCEDURE — 74330 X-RAY BILE/PANC ENDOSCOPY: CPT

## 2021-08-12 PROCEDURE — 83735 ASSAY OF MAGNESIUM: CPT

## 2021-08-12 PROCEDURE — 7100000001 HC PACU RECOVERY - ADDTL 15 MIN: Performed by: INTERNAL MEDICINE

## 2021-08-12 PROCEDURE — 6370000000 HC RX 637 (ALT 250 FOR IP): Performed by: STUDENT IN AN ORGANIZED HEALTH CARE EDUCATION/TRAINING PROGRAM

## 2021-08-12 PROCEDURE — 6360000002 HC RX W HCPCS: Performed by: STUDENT IN AN ORGANIZED HEALTH CARE EDUCATION/TRAINING PROGRAM

## 2021-08-12 PROCEDURE — 7100000000 HC PACU RECOVERY - FIRST 15 MIN: Performed by: INTERNAL MEDICINE

## 2021-08-12 PROCEDURE — 6370000000 HC RX 637 (ALT 250 FOR IP): Performed by: INTERNAL MEDICINE

## 2021-08-12 PROCEDURE — 3700000000 HC ANESTHESIA ATTENDED CARE: Performed by: INTERNAL MEDICINE

## 2021-08-12 PROCEDURE — 3609014900 HC ERCP W/SPHINCTEROTOMY &/OR PAPILLOTOMY: Performed by: INTERNAL MEDICINE

## 2021-08-12 DEVICE — BILIARY STENT WITH NAVIFLEXTM RX DELIVERY SYSTEM
Type: IMPLANTABLE DEVICE | Site: BILE DUCT | Status: FUNCTIONAL
Brand: ADVANIX™ BILIARY

## 2021-08-12 RX ORDER — LIDOCAINE HYDROCHLORIDE 20 MG/ML
INJECTION, SOLUTION EPIDURAL; INFILTRATION; INTRACAUDAL; PERINEURAL PRN
Status: DISCONTINUED | OUTPATIENT
Start: 2021-08-12 | End: 2021-08-12 | Stop reason: SDUPTHER

## 2021-08-12 RX ORDER — SODIUM CHLORIDE, SODIUM LACTATE, POTASSIUM CHLORIDE, CALCIUM CHLORIDE 600; 310; 30; 20 MG/100ML; MG/100ML; MG/100ML; MG/100ML
INJECTION, SOLUTION INTRAVENOUS CONTINUOUS
Status: DISCONTINUED | OUTPATIENT
Start: 2021-08-12 | End: 2021-08-13

## 2021-08-12 RX ORDER — ONDANSETRON 2 MG/ML
4 INJECTION INTRAMUSCULAR; INTRAVENOUS
Status: DISCONTINUED | OUTPATIENT
Start: 2021-08-12 | End: 2021-08-12 | Stop reason: HOSPADM

## 2021-08-12 RX ORDER — PROCHLORPERAZINE EDISYLATE 5 MG/ML
5 INJECTION INTRAMUSCULAR; INTRAVENOUS
Status: DISCONTINUED | OUTPATIENT
Start: 2021-08-12 | End: 2021-08-12 | Stop reason: HOSPADM

## 2021-08-12 RX ORDER — HYDRALAZINE HYDROCHLORIDE 20 MG/ML
5 INJECTION INTRAMUSCULAR; INTRAVENOUS EVERY 10 MIN PRN
Status: DISCONTINUED | OUTPATIENT
Start: 2021-08-12 | End: 2021-08-12 | Stop reason: HOSPADM

## 2021-08-12 RX ORDER — ESMOLOL HYDROCHLORIDE 10 MG/ML
INJECTION INTRAVENOUS PRN
Status: DISCONTINUED | OUTPATIENT
Start: 2021-08-12 | End: 2021-08-12 | Stop reason: SDUPTHER

## 2021-08-12 RX ORDER — SODIUM CHLORIDE, SODIUM LACTATE, POTASSIUM CHLORIDE, CALCIUM CHLORIDE 600; 310; 30; 20 MG/100ML; MG/100ML; MG/100ML; MG/100ML
INJECTION, SOLUTION INTRAVENOUS CONTINUOUS PRN
Status: DISCONTINUED | OUTPATIENT
Start: 2021-08-12 | End: 2021-08-12 | Stop reason: SDUPTHER

## 2021-08-12 RX ORDER — FENTANYL CITRATE 50 UG/ML
INJECTION, SOLUTION INTRAMUSCULAR; INTRAVENOUS PRN
Status: DISCONTINUED | OUTPATIENT
Start: 2021-08-12 | End: 2021-08-12 | Stop reason: SDUPTHER

## 2021-08-12 RX ORDER — PROPOFOL 10 MG/ML
INJECTION, EMULSION INTRAVENOUS PRN
Status: DISCONTINUED | OUTPATIENT
Start: 2021-08-12 | End: 2021-08-12 | Stop reason: SDUPTHER

## 2021-08-12 RX ORDER — HYDROCODONE BITARTRATE AND ACETAMINOPHEN 5; 325 MG/1; MG/1
1 TABLET ORAL
Status: DISCONTINUED | OUTPATIENT
Start: 2021-08-12 | End: 2021-08-12 | Stop reason: HOSPADM

## 2021-08-12 RX ORDER — ROCURONIUM BROMIDE 10 MG/ML
INJECTION, SOLUTION INTRAVENOUS PRN
Status: DISCONTINUED | OUTPATIENT
Start: 2021-08-12 | End: 2021-08-12 | Stop reason: SDUPTHER

## 2021-08-12 RX ORDER — MEPERIDINE HYDROCHLORIDE 25 MG/ML
12.5 INJECTION INTRAMUSCULAR; INTRAVENOUS; SUBCUTANEOUS EVERY 5 MIN PRN
Status: DISCONTINUED | OUTPATIENT
Start: 2021-08-12 | End: 2021-08-12 | Stop reason: HOSPADM

## 2021-08-12 RX ORDER — 0.9 % SODIUM CHLORIDE 0.9 %
500 INTRAVENOUS SOLUTION INTRAVENOUS
Status: DISCONTINUED | OUTPATIENT
Start: 2021-08-12 | End: 2021-08-12 | Stop reason: HOSPADM

## 2021-08-12 RX ORDER — DIPHENHYDRAMINE HYDROCHLORIDE 50 MG/ML
12.5 INJECTION INTRAMUSCULAR; INTRAVENOUS
Status: DISCONTINUED | OUTPATIENT
Start: 2021-08-12 | End: 2021-08-12 | Stop reason: HOSPADM

## 2021-08-12 RX ORDER — ONDANSETRON 2 MG/ML
INJECTION INTRAMUSCULAR; INTRAVENOUS PRN
Status: DISCONTINUED | OUTPATIENT
Start: 2021-08-12 | End: 2021-08-12 | Stop reason: SDUPTHER

## 2021-08-12 RX ORDER — DEXAMETHASONE SODIUM PHOSPHATE 4 MG/ML
INJECTION, SOLUTION INTRA-ARTICULAR; INTRALESIONAL; INTRAMUSCULAR; INTRAVENOUS; SOFT TISSUE PRN
Status: DISCONTINUED | OUTPATIENT
Start: 2021-08-12 | End: 2021-08-12 | Stop reason: SDUPTHER

## 2021-08-12 RX ADMIN — TRACE ELEMENTS INJECTION 4: 7.4; .75; 98; 151 INJECTION, SOLUTION INTRAVENOUS at 18:58

## 2021-08-12 RX ADMIN — ROCURONIUM BROMIDE 40 MG: 10 INJECTION INTRAVENOUS at 16:54

## 2021-08-12 RX ADMIN — I.V. FAT EMULSION 250 ML: 20 EMULSION INTRAVENOUS at 21:52

## 2021-08-12 RX ADMIN — POTASSIUM PHOSPHATE, MONOBASIC AND POTASSIUM PHOSPHATE, DIBASIC 30 MMOL: 224; 236 INJECTION, SOLUTION, CONCENTRATE INTRAVENOUS at 09:11

## 2021-08-12 RX ADMIN — PIPERACILLIN AND TAZOBACTAM 3375 MG: 3; .375 INJECTION, POWDER, LYOPHILIZED, FOR SOLUTION INTRAVENOUS at 02:24

## 2021-08-12 RX ADMIN — ATORVASTATIN CALCIUM 40 MG: 40 TABLET, FILM COATED ORAL at 20:31

## 2021-08-12 RX ADMIN — SODIUM CHLORIDE, SODIUM LACTATE, POTASSIUM CHLORIDE, AND CALCIUM CHLORIDE: .6; .31; .03; .02 INJECTION, SOLUTION INTRAVENOUS at 16:48

## 2021-08-12 RX ADMIN — ONDANSETRON 4 MG: 2 INJECTION INTRAMUSCULAR; INTRAVENOUS at 17:12

## 2021-08-12 RX ADMIN — SUGAMMADEX 200 MG: 100 INJECTION, SOLUTION INTRAVENOUS at 17:22

## 2021-08-12 RX ADMIN — Medication 10 ML: at 08:15

## 2021-08-12 RX ADMIN — METOPROLOL TARTRATE 25 MG: 25 TABLET, FILM COATED ORAL at 20:31

## 2021-08-12 RX ADMIN — DEXAMETHASONE SODIUM PHOSPHATE 4 MG: 4 INJECTION, SOLUTION INTRAMUSCULAR; INTRAVENOUS at 17:12

## 2021-08-12 RX ADMIN — FENTANYL CITRATE 100 MCG: 50 INJECTION, SOLUTION INTRAMUSCULAR; INTRAVENOUS at 17:17

## 2021-08-12 RX ADMIN — ESMOLOL HYDROCHLORIDE 30 MG: 10 INJECTION, SOLUTION INTRAVENOUS at 16:59

## 2021-08-12 RX ADMIN — METOPROLOL TARTRATE 25 MG: 25 TABLET, FILM COATED ORAL at 09:18

## 2021-08-12 RX ADMIN — AMLODIPINE BESYLATE 10 MG: 10 TABLET ORAL at 09:18

## 2021-08-12 RX ADMIN — Medication 5 ML: at 08:15

## 2021-08-12 RX ADMIN — PIPERACILLIN AND TAZOBACTAM 3375 MG: 3; .375 INJECTION, POWDER, LYOPHILIZED, FOR SOLUTION INTRAVENOUS at 11:50

## 2021-08-12 RX ADMIN — LIDOCAINE HYDROCHLORIDE 50 MG: 20 INJECTION, SOLUTION EPIDURAL; INFILTRATION; INTRACAUDAL; PERINEURAL at 16:54

## 2021-08-12 RX ADMIN — FENTANYL CITRATE 100 MCG: 50 INJECTION, SOLUTION INTRAMUSCULAR; INTRAVENOUS at 17:30

## 2021-08-12 RX ADMIN — PIPERACILLIN AND TAZOBACTAM 3375 MG: 3; .375 INJECTION, POWDER, LYOPHILIZED, FOR SOLUTION INTRAVENOUS at 18:51

## 2021-08-12 RX ADMIN — PROPOFOL 80 MG: 10 INJECTION, EMULSION INTRAVENOUS at 16:54

## 2021-08-12 RX ADMIN — ESMOLOL HYDROCHLORIDE 20 MG: 10 INJECTION, SOLUTION INTRAVENOUS at 17:17

## 2021-08-12 ASSESSMENT — PULMONARY FUNCTION TESTS
PIF_VALUE: 17
PIF_VALUE: 15
PIF_VALUE: 18
PIF_VALUE: 17
PIF_VALUE: 0
PIF_VALUE: 17
PIF_VALUE: 1
PIF_VALUE: 0
PIF_VALUE: 18
PIF_VALUE: 17
PIF_VALUE: 0
PIF_VALUE: 2
PIF_VALUE: 4
PIF_VALUE: 19
PIF_VALUE: 2
PIF_VALUE: 0
PIF_VALUE: 15
PIF_VALUE: 18
PIF_VALUE: 17
PIF_VALUE: 15
PIF_VALUE: 17
PIF_VALUE: 2
PIF_VALUE: 17
PIF_VALUE: 18
PIF_VALUE: 19
PIF_VALUE: 18
PIF_VALUE: 0
PIF_VALUE: 18
PIF_VALUE: 18
PIF_VALUE: 0
PIF_VALUE: 1
PIF_VALUE: 18
PIF_VALUE: 16
PIF_VALUE: 1
PIF_VALUE: 17
PIF_VALUE: 18
PIF_VALUE: 0
PIF_VALUE: 0
PIF_VALUE: 1
PIF_VALUE: 29
PIF_VALUE: 15
PIF_VALUE: 17
PIF_VALUE: 4
PIF_VALUE: 14
PIF_VALUE: 2
PIF_VALUE: 2
PIF_VALUE: 17
PIF_VALUE: 17
PIF_VALUE: 1
PIF_VALUE: 16
PIF_VALUE: 17
PIF_VALUE: 0

## 2021-08-12 ASSESSMENT — PAIN SCALES - GENERAL
PAINLEVEL_OUTOF10: 0

## 2021-08-12 ASSESSMENT — PAIN - FUNCTIONAL ASSESSMENT: PAIN_FUNCTIONAL_ASSESSMENT: 0-10

## 2021-08-12 ASSESSMENT — LIFESTYLE VARIABLES: SMOKING_STATUS: 0

## 2021-08-12 NOTE — CARE COORDINATION
Cm following, pt NPO for ERCP with stent per GI today. Cont TPN, referral to SNF.   Electronically signed by Hunter Bai RN on 8/12/2021 at 9:37 AM  [phone]

## 2021-08-12 NOTE — PROGRESS NOTES
Mon-Wed-Fri only (due to national shortage) & Trace Elements 1 mL/day with PN daily.  Labs will be monitored daily and PN will be re-ordered daily. Weekly triglyceride ordered (first 8/16) per PN protocol.     Please call with questions--  Thanks--  Ana Earl, PharmD, BCPS, BCGP  [id number] ([facility])   8/12/2021 9:11 AM

## 2021-08-12 NOTE — PROGRESS NOTES
Occupational Therapy  Chart reviewed. Pt is off the unit for ERCP. No tx rendered. BOSTON Weeks, [facility]

## 2021-08-12 NOTE — OP NOTE
[facility] [place]  Endoscopy Procedure Note      Kanu Valerio  : 1932  MR# [id number]  [phone] (home)    Primary physician = Steve Nugent MD       DATE: 21    PROCEDURE(S) PERFORMED:  1. ERCP with biliary sphincterotomy  2. ERCP with biliary stent placement  3. ERCP with fluoroscopy    PREOPERATIVE DIAGNOSIS:  Bile leak    POSTOPERATIVE DIAGNOSIS(ES):  1. Gastritis and duodenitis  2. Bile leak from cystic stump status post biliary sphincterotomy and stent placement  3. No evidence of choledocholithiasis      INDICATION:  Kanu Valerio is a 80 y.o. female here for an ERCP for bile leak. Patient admitted for acute cholecystitis status post subtotal cholecystectomy complicated by bile leak. CONSENT:  Informed consent was obtained. The anesthesia and procedure along with the risks, benefits, and alternatives were discussed by myself and the nursing staff to the satisfaction of the patient/family with ample opportunity to ask and answer questions. We discussed the risks associated with this procedure including (but not limited to) bleeding, perforation, anesthesia reaction, post-procedural pancreatitis, cholangitis, or even death. PREMEDICATIONS AND OTHER MEDICATIONS:  Provided by anesthesia service. DETAILS OF PROCEDURE:  The patient was brought to the fluoroscopy unit and underwent endotracheal intubation under general anesthesia. The patient was placed in the left lateral prone position. The Olympus therapeutic duodenoscope was inserted into the oropharynx and blindly advanced into the esophagus. The endoscope was then advanced under direct vision through the esophagus, stomach, and duodenum. The papilla was visualized in the second portion of the duodenum where the major papilla was visualized.     Luminal:  · The visualized portions of the esophagus were normal  · Mild antral gastritis  · Bulbar duodenitis with mild stenosis, able to pass duodenoscope with minimal resistance  · The papilla was visualized in the second portion of the duodenum, appeared to be in the normal location and size. Cholangiogram/Interventions:  · The biliary orifice was then selectively cannulated and the biliary tree was opacified using a tapered tip, pull - type sphincterotome to confirm location within the duct. · A 12 mm biliary sphinctertomy was performed in the 12 o'clock direction using the tapered sphincterotome and blended cautery current over a guidewire. · Initial cholangiogram revealed normal caliber bile ducts without evidence of CBD stones. Normal bilateral intrahepatic ducts. · Extravasation of contrast from cystic stump consistent with bile leak. · A 9/12 mm multi-diameter balloon catheter was advanced through the biliary orifice, up the common duct to the confluence of the right and left hepatic ducts. Contrast was injected to confirm no stones upstream. The balloon was inflated to 12 mm and swept down through the common hepatic duct, common bile duct and out through the sphincterotomy into the duodenum. · No stones or debris removed on repeated 12 mm balloon sweeps into the duodenum  · Successful placement of a 10 Fr x 7 cm plastic biliary stent, proximal aspect in the common hepatic duct and the distal aspect in the duodenum, good flow of bile through the stent   · At this point, the cannulas were withdrawn. The duodenum and stomach were decompressed and the scope was withdrawn from the patient. · X-ray images and real-time fluoroscopy were carefully interpreted on the spot by the endoscopist during the procedure in the absence of a radiologist.  These interpretations helped guide the decision-making and interventions throughout the course of the procedure. Pancreatogram:  · Intentionally not performed      The patient tolerated the procedure well. The patient was transferred to PACU in good condition. There were no apparent complications. Estimated Blood loss - minimal.      RECOMMENDATIONS:    1. Resume pre-procedure medications and orders. Okay to start clear liquid diet and slowly advance as tolerated  2. Please notify GI service with any acute changes in the patient's clinical status (fever, hypotension, severe pain, bleeding, etc). 3. Bile leak from cystic stump status post biliary sphincterotomy and stent placement. Monitor for decrease in output from ZAYRA drain, defer further management to General surgery (Dr. Jessica Diaz)  4. Will arrange for outpatient ERCP for stent removal in 6-8 weeks to ensure closure of bile leak      Please do not hesitate to call with questions/concerns or if  I can be of further assistance in the care of this patient.         Sammy Mueller MD, MD  [facility] / [facility] [place]  [address] [place] [address] Simran Jackson, [address]  [phone] (phone)  [phone] (fax)

## 2021-08-12 NOTE — PROGRESS NOTES
Patient A/Ox4; VSS. Up with standby assistance using walker. Moderate amount of drainage around ZAYRA site - dressing changed and secured. Patient returned from ERCP with no pain, 2L oxygen continued due to drowsiness. Referral send for SNF. All needs met at this time.

## 2021-08-12 NOTE — PROGRESS NOTES
Colorectal Surgery   Daily Progress Note  Michael Michelle  CC:  Symptomatic cholelitiasis    SUBJECTIVE:  No acute events overnight. Patient denies any abdominal pain. NPO for procedure today. ROS:   A 14 point review of systems was conducted, significant findings as noted in HPI. All other systems negative. OBJECTIVE:   Infusions:   PN-Adult Premix 5/15 - Standard Electrolytes - Central Line 70 mL/hr at 08/11/21 1857    sodium chloride      sodium chloride          I/O:I/O last 3 completed shifts: In: 2361.9 [P.O.:540; IV Piggyback:864.4]  Out: 360 [Urine:50; Drains:310]           Wt Readings from Last 1 Encounters:   08/09/21 162 lb 14.7 oz (73.9 kg)     LABS:    Recent Labs     08/10/21  0530 08/11/21  0512   WBC 10.3 10.0   HGB 11.3* 11.2*   HCT 32.2* 32.2*   MCV 91.7 91.4    470*     Recent Labs     08/10/21  0530 08/11/21  0512    137   K 3.6 3.2*    105   CO2 24 24   PHOS 2.5 2.5   BUN 16 19   CREATININE 0.9 1.0     Recent Labs     08/10/21  0530 08/11/21  0512   AST 23 23   ALT 15 16   BILIDIR <0.2 <0.2   BILITOT 0.6 0.4   ALKPHOS 119 120      No results for input(s): LIPASE, AMYLASE in the last 72 hours. Recent Labs     08/10/21  0530 08/10/21  0919 08/11/21  0512   PROT 5.8*  --  5.7*   INR  --  1.14*  --       No results for input(s): CKTOTAL, CKMB, CKMBINDEX, TROPONINI in the last 72 hours.          Exam  BP (!) 145/81   Pulse 106   Temp 98.1 °F (36.7 °C) (Oral)   Resp 16   Ht 5' 7\" (1.702 m)   Wt 162 lb 14.7 oz (73.9 kg)   SpO2 92%   BMI 25.52 kg/m²      General Appearance: alert, appears stated age and cooperative  Cardiovascular: RRR, brisk capillary refill  Abdomen: Soft, appropriately-tender, incisions c/d/i and well approximated with Dermabond, ZAYRA drain in place with bilious output  Skin: warm and dry, no rashes  Extremities: no edema, no cyanosis  Neuro: A&Ox3, no focal deficits, sensation intact    ASSESSMENT/PLAN:   Pt. is a 80 y.o. female s/p laparoscopic subtotal cholecystectomy (8/6).  POD6.    - Plan for ERCP this afternoon 2/2 to bile leak s/p cholecystectomy  - Continue ABx  - Continue NPO, continue TPN for nutritional support  - Continue to hold Lovenox until procedure is complete  - PT/OT consult    Debi Mclaughlin DO  PGY1, General Surgery  08/12/21  6:13 AM  [phone]

## 2021-08-12 NOTE — ANESTHESIA PRE PROCEDURE
Department of Anesthesiology  Preprocedure Note       Name:  Fernando Matos   Age:  80 y.o.  :  1932                                          MRN:  [id number]         Date:  2021      Surgeon: Fabio Jimenez):  Yehuda Lawson MD    Procedure: Procedure(s):  ERCP DIAGNOSTIC    Medications prior to admission:   Prior to Admission medications    Medication Sig Start Date End Date Taking? Authorizing Provider   docusate sodium (COLACE) 100 MG capsule Take 1 capsule by mouth 2 times daily for 7 days Please take while taking narcotic pain medicine. If you develop loose or watery stools, then stop taking.  21 Yes Guille Payan MD   clopidogrel (PLAVIX) 75 MG tablet TAKE 1 TABLET BY MOUTH DAILY 19  Hunter Bruno MD   lisinopril (PRINIVIL;ZESTRIL) 10 MG tablet Take 1 tablet by mouth daily 3/2/19   Umer Silva MD   aspirin 81 MG EC tablet Take 1 tablet by mouth daily 3/1/19   Umer Silva MD   atorvastatin (LIPITOR) 40 MG tablet Take 1 tablet by mouth nightly 19   Umer Silva MD   metoprolol tartrate (LOPRESSOR) 25 MG tablet Take 1 tablet by mouth 2 times daily 19   Umer Silva MD   amLODIPine (NORVASC) 10 MG tablet Take 1 tablet by mouth daily 3/1/19   Umer Silva MD   clopidogrel (PLAVIX) 75 MG tablet Take 1 tablet by mouth daily 3/1/19   Umer Silva MD       Current medications:    Current Facility-Administered Medications   Medication Dose Route Frequency Provider Last Rate Last Admin    PN-Adult Premix 5/20 - Standard Electrolytes - Central Line   Intravenous Continuous TPN Guille Payan MD        fat emulsion 20 % infusion 250 mL  250 mL Intravenous Continuous TPN Guille Payan MD        lactated ringers infusion   Intravenous Continuous Yehuda Lawson MD        PN-Adult Premix 5/15 - Standard Electrolytes - Central Line   Intravenous Continuous TPN Emiliano Abebe, RPH 70 mL/hr at 21 1432 Rate Verify at 21 1432    sodium chloride flush 0.9 % injection 5-40 mL  5-40 mL Intravenous 2 times per day Emily Orantes MD   10 mL at 08/12/21 0815    sodium chloride flush 0.9 % injection 5-40 mL  5-40 mL Intravenous PRN Emily Orantes MD        0.9 % sodium chloride infusion  25 mL Intravenous PRN Emily Orantes MD        atorvastatin (LIPITOR) tablet 40 mg  40 mg Oral Nightly Emily Orantes MD   40 mg at 08/11/21 2026    metoprolol tartrate (LOPRESSOR) tablet 25 mg  25 mg Oral BID Emily Orantes MD   25 mg at 08/12/21 0918    amLODIPine (NORVASC) tablet 10 mg  10 mg Oral Daily Emily Orantes MD   10 mg at 08/12/21 0918    acetaminophen (TYLENOL) tablet 1,000 mg  1,000 mg Oral Q6H Melissa Rodríguez MD   1,000 mg at 08/08/21 2349    oxyCODONE (ROXICODONE) immediate release tablet 5 mg  5 mg Oral Q4H PRN Melissa Rodríguez MD        Or    oxyCODONE (ROXICODONE) immediate release tablet 10 mg  10 mg Oral Q4H PRN Melissa Rodríguez MD        piperacillin-tazobactam (ZOSYN) 3,375 mg in dextrose 5 % 50 mL IVPB extended infusion (mini-bag)  3,375 mg Intravenous Q8H Emily Orantes MD   Stopped at 08/12/21 1550    sodium chloride flush 0.9 % injection 5-40 mL  5-40 mL Intravenous 2 times per day Emily Orantes MD   5 mL at 08/12/21 0815    sodium chloride flush 0.9 % injection 5-40 mL  5-40 mL Intravenous PRN Emily Orantes MD        0.9 % sodium chloride infusion  25 mL Intravenous PRN Emily Orantes MD        ondansetron (ZOFRAN-ODT) disintegrating tablet 4 mg  4 mg Oral Q8H PRN Emily Orantes MD        Or    ondansetron [facility]) injection 4 mg  4 mg Intravenous Q6H PRN Emily Orantes MD   4 mg at 08/11/21 2734       Allergies:  No Known Allergies    Problem List:    Patient Active Problem List   Diagnosis Code    Hypertensive urgency I16.0    Cerebrovascular accident (CVA) of right thalamus ([facility] [address] I63.9    Abnormal gait R26.9    Carotid stenosis I65.29    Acute calculous cholecystitis K80.00    Pre-op evaluation B76.759       Past Medical History:        Diagnosis Date    CVA (cerebral vascular accident) ([facility] [address] 03/2019    Hypertension        Past Surgical History:        Procedure Laterality Date    CHOLECYSTECTOMY, LAPAROSCOPIC N/A 08/06/2021    LAPAROSCOPIC SUBTOTAL CHOLECYSTECTOMY performed by Catalino Rodríguez MD at [address]  History:    Social History     Tobacco Use    Smoking status: Never Smoker    Smokeless tobacco: Never Used   Substance Use Topics    Alcohol use: No                                Counseling given: Not Answered      Vital Signs (Current):   Vitals:    08/12/21 0238 08/12/21 0815 08/12/21 1211 08/12/21 1509   BP: (!) 145/81 (!) 152/80 (!) 149/87 (!) 143/72   Pulse: 106 108 100 105   Resp: 16 16 22   Temp: 98.1 °F (36.7 °C) 98.6 °F (37 °C) 98.6 °F (37 °C) 97.2 °F (36.2 °C)   TempSrc: Oral Oral Oral Tympanic   SpO2: 92% 97% 95% 94%   Weight:       Height:                                                  BP Readings from Last 3 Encounters:   08/12/21 (!) 143/72   08/06/21 (!) 105/53   08/03/21 (!) 151/68       NPO Status: Time of last liquid consumption: 0000                        Time of last solid consumption: 0000                        Date of last liquid consumption: 08/12/21                        Date of last solid food consumption: 08/12/21    BMI:   Wt Readings from Last 3 Encounters:   08/09/21 162 lb 14.7 oz (73.9 kg)   08/03/21 150 lb (68 kg)   12/16/19 154 lb (69.9 kg)     Body mass index is 25.52 kg/m².     CBC:   Lab Results   Component Value Date    WBC 10.0 08/11/2021    RBC 3.52 08/11/2021    HGB 11.2 08/11/2021    HCT 32.2 08/11/2021    MCV 91.4 08/11/2021    RDW 15.2 08/11/2021     08/11/2021       CMP:   Lab Results   Component Value Date     08/12/2021    K 3.4 08/12/2021    K 3.8 08/05/2021     08/12/2021    CO2 24 08/12/2021    BUN 19 08/12/2021    CREATININE 0.9 08/12/2021    GFRAA >60 08/12/2021    AGRATIO 0.9 08/03/2021    LABGLOM 59 08/12/2021    GLUCOSE 188 08/12/2021    PROT 5.7 08/11/2021    CALCIUM 7.8 08/12/2021    BILITOT 0.4 08/11/2021    ALKPHOS 120 08/11/2021    AST 23 08/11/2021    ALT 16 08/11/2021       POC Tests:   Recent Labs     08/12/21  1152   POCGLU 183*       Coags:   Lab Results   Component Value Date    PROTIME 12.9 08/10/2021    INR 1.14 08/10/2021    APTT 34.1 02/26/2019       HCG (If Applicable): No results found for: PREGTESTUR, PREGSERUM, HCG, HCGQUANT     ABGs: No results found for: PHART, PO2ART, CFH7UNO, OTE4NMD, BEART, F7DBJDPP     Type & Screen (If Applicable):  No results found for: LABABO, LABRH    Drug/Infectious Status (If Applicable):  No results found for: HIV, HEPCAB    COVID-19 Screening (If Applicable): No results found for: COVID19        Anesthesia Evaluation  Patient summary reviewed and Nursing notes reviewed no history of anesthetic complications:   Airway: Mallampati: III  TM distance: <3 FB   Neck ROM: limited  Comment: Prominent upper incisors   Recessed chin    Mouth opening: > = 3 FB Dental:    (+) partials and upper dentures      Pulmonary:       (-) not a current smoker (never)                           Cardiovascular:    (+) hypertension:,     (-) past MI    NYHA Classification: II  ECG reviewed  Rhythm: regular  Rate: normal  Echocardiogram reviewed         Beta Blocker:  Dose within 24 Hrs      ROS comment: Pulm htn   35 mmhg     Preserved ef         Neuro/Psych:   (+) CVA (left side weakness ): residual symptoms,             GI/Hepatic/Renal:        (-) GERD      ROS comment: Had lap luis armando 8/6/21  Now with bile leak for ercp . Endo/Other:                     Abdominal:   (+) scaphoid          Vascular:   + PVD, aortic or cerebral, . Other Findings:             Anesthesia Plan      general     ASA 4 - emergent       Induction: intravenous. MIPS: Prophylactic antiemetics administered. Anesthetic plan and risks discussed with patient. Plan discussed with CRNA.     Attending anesthesiologist reviewed and agrees with Preprocedure content              Natalie Nolen DO   8/12/2021

## 2021-08-12 NOTE — PLAN OF CARE
Problem: Falls - Risk of:  Goal: Will remain free from falls  Description: Will remain free from falls  8/12/2021 0408 by Luisito Bond RN  Outcome: Ongoing  Patient calling appropriately, bed alarm in use, standby assist with rolling walker

## 2021-08-12 NOTE — PROGRESS NOTES
Patient alert, oriented. Assisted with positioning. Up with walker with one assist to bathroom. Urine sample sent. PICC line YOUNG intact. Aware and verbalized understanding of NPO for procedure. Slept short intervals between care.

## 2021-08-13 LAB
ALBUMIN SERPL-MCNC: 2.6 G/DL (ref 3.4–5)
ANION GAP SERPL CALCULATED.3IONS-SCNC: 12 MMOL/L (ref 3–16)
BASOPHILS ABSOLUTE: 0 K/UL (ref 0–0.2)
BASOPHILS RELATIVE PERCENT: 0.1 %
BUN BLDV-MCNC: 24 MG/DL (ref 7–20)
CALCIUM SERPL-MCNC: 7.9 MG/DL (ref 8.3–10.6)
CHLORIDE BLD-SCNC: 101 MMOL/L (ref 99–110)
CO2: 23 MMOL/L (ref 21–32)
CREAT SERPL-MCNC: 0.9 MG/DL (ref 0.6–1.2)
EOSINOPHILS ABSOLUTE: 0 K/UL (ref 0–0.6)
EOSINOPHILS RELATIVE PERCENT: 0.1 %
GFR AFRICAN AMERICAN: >60
GFR NON-AFRICAN AMERICAN: 59
GLUCOSE BLD-MCNC: 145 MG/DL (ref 70–99)
GLUCOSE BLD-MCNC: 183 MG/DL (ref 70–99)
GLUCOSE BLD-MCNC: 190 MG/DL (ref 70–99)
GLUCOSE BLD-MCNC: 221 MG/DL (ref 70–99)
GLUCOSE BLD-MCNC: 319 MG/DL (ref 70–99)
GLUCOSE BLD-MCNC: 334 MG/DL (ref 70–99)
GLUCOSE BLD-MCNC: 335 MG/DL (ref 70–99)
HCT VFR BLD CALC: 29.7 % (ref 36–48)
HEMOGLOBIN: 10.4 G/DL (ref 12–16)
LYMPHOCYTES ABSOLUTE: 0.4 K/UL (ref 1–5.1)
LYMPHOCYTES RELATIVE PERCENT: 4.2 %
MAGNESIUM: 2.2 MG/DL (ref 1.8–2.4)
MCH RBC QN AUTO: 32.2 PG (ref 26–34)
MCHC RBC AUTO-ENTMCNC: 35 G/DL (ref 31–36)
MCV RBC AUTO: 91.8 FL (ref 80–100)
MONOCYTES ABSOLUTE: 1.1 K/UL (ref 0–1.3)
MONOCYTES RELATIVE PERCENT: 11 %
NEUTROPHILS ABSOLUTE: 8.7 K/UL (ref 1.7–7.7)
NEUTROPHILS RELATIVE PERCENT: 84.6 %
PDW BLD-RTO: 14.7 % (ref 12.4–15.4)
PERFORMED ON: ABNORMAL
PHOSPHORUS: 2.9 MG/DL (ref 2.5–4.9)
PLATELET # BLD: 352 K/UL (ref 135–450)
PMV BLD AUTO: 8.1 FL (ref 5–10.5)
POTASSIUM SERPL-SCNC: 4.5 MMOL/L (ref 3.5–5.1)
RBC # BLD: 3.24 M/UL (ref 4–5.2)
SODIUM BLD-SCNC: 136 MMOL/L (ref 136–145)
WBC # BLD: 10.3 K/UL (ref 4–11)

## 2021-08-13 PROCEDURE — 6360000002 HC RX W HCPCS: Performed by: NURSE PRACTITIONER

## 2021-08-13 PROCEDURE — 85025 COMPLETE CBC W/AUTO DIFF WBC: CPT

## 2021-08-13 PROCEDURE — 6370000000 HC RX 637 (ALT 250 FOR IP)

## 2021-08-13 PROCEDURE — 2500000003 HC RX 250 WO HCPCS: Performed by: STUDENT IN AN ORGANIZED HEALTH CARE EDUCATION/TRAINING PROGRAM

## 2021-08-13 PROCEDURE — 80069 RENAL FUNCTION PANEL: CPT

## 2021-08-13 PROCEDURE — 6360000002 HC RX W HCPCS: Performed by: STUDENT IN AN ORGANIZED HEALTH CARE EDUCATION/TRAINING PROGRAM

## 2021-08-13 PROCEDURE — 1200000000 HC SEMI PRIVATE

## 2021-08-13 PROCEDURE — 6360000002 HC RX W HCPCS: Performed by: SURGERY

## 2021-08-13 PROCEDURE — 2580000003 HC RX 258: Performed by: STUDENT IN AN ORGANIZED HEALTH CARE EDUCATION/TRAINING PROGRAM

## 2021-08-13 PROCEDURE — 6370000000 HC RX 637 (ALT 250 FOR IP): Performed by: STUDENT IN AN ORGANIZED HEALTH CARE EDUCATION/TRAINING PROGRAM

## 2021-08-13 PROCEDURE — 83735 ASSAY OF MAGNESIUM: CPT

## 2021-08-13 PROCEDURE — 97535 SELF CARE MNGMENT TRAINING: CPT

## 2021-08-13 PROCEDURE — 36415 COLL VENOUS BLD VENIPUNCTURE: CPT

## 2021-08-13 RX ORDER — DEXTROSE MONOHYDRATE 50 MG/ML
100 INJECTION, SOLUTION INTRAVENOUS PRN
Status: DISCONTINUED | OUTPATIENT
Start: 2021-08-13 | End: 2021-08-16 | Stop reason: HOSPADM

## 2021-08-13 RX ORDER — PROCHLORPERAZINE EDISYLATE 5 MG/ML
5 INJECTION INTRAMUSCULAR; INTRAVENOUS EVERY 6 HOURS PRN
Status: DISCONTINUED | OUTPATIENT
Start: 2021-08-13 | End: 2021-08-16 | Stop reason: HOSPADM

## 2021-08-13 RX ORDER — DEXTROSE MONOHYDRATE 25 G/50ML
12.5 INJECTION, SOLUTION INTRAVENOUS PRN
Status: DISCONTINUED | OUTPATIENT
Start: 2021-08-13 | End: 2021-08-16 | Stop reason: HOSPADM

## 2021-08-13 RX ORDER — NICOTINE POLACRILEX 4 MG
15 LOZENGE BUCCAL PRN
Status: DISCONTINUED | OUTPATIENT
Start: 2021-08-13 | End: 2021-08-16 | Stop reason: HOSPADM

## 2021-08-13 RX ORDER — INSULIN LISPRO 100 [IU]/ML
0-6 INJECTION, SOLUTION INTRAVENOUS; SUBCUTANEOUS EVERY 4 HOURS
Status: DISCONTINUED | OUTPATIENT
Start: 2021-08-13 | End: 2021-08-15

## 2021-08-13 RX ADMIN — ENOXAPARIN SODIUM 40 MG: 40 INJECTION SUBCUTANEOUS at 09:15

## 2021-08-13 RX ADMIN — INSULIN LISPRO 2 UNITS: 100 INJECTION, SOLUTION INTRAVENOUS; SUBCUTANEOUS at 13:27

## 2021-08-13 RX ADMIN — PIPERACILLIN AND TAZOBACTAM 3375 MG: 3; .375 INJECTION, POWDER, LYOPHILIZED, FOR SOLUTION INTRAVENOUS at 02:23

## 2021-08-13 RX ADMIN — METOPROLOL TARTRATE 25 MG: 25 TABLET, FILM COATED ORAL at 20:46

## 2021-08-13 RX ADMIN — ATORVASTATIN CALCIUM 40 MG: 40 TABLET, FILM COATED ORAL at 20:46

## 2021-08-13 RX ADMIN — Medication 10 ML: at 20:46

## 2021-08-13 RX ADMIN — METOPROLOL TARTRATE 25 MG: 25 TABLET, FILM COATED ORAL at 09:15

## 2021-08-13 RX ADMIN — AMLODIPINE BESYLATE 10 MG: 10 TABLET ORAL at 09:15

## 2021-08-13 RX ADMIN — Medication 10 ML: at 11:31

## 2021-08-13 RX ADMIN — PIPERACILLIN AND TAZOBACTAM 3375 MG: 3; .375 INJECTION, POWDER, LYOPHILIZED, FOR SOLUTION INTRAVENOUS at 11:30

## 2021-08-13 RX ADMIN — INSULIN LISPRO 4 UNITS: 100 INJECTION, SOLUTION INTRAVENOUS; SUBCUTANEOUS at 09:55

## 2021-08-13 RX ADMIN — INSULIN LISPRO 1 UNITS: 100 INJECTION, SOLUTION INTRAVENOUS; SUBCUTANEOUS at 18:40

## 2021-08-13 RX ADMIN — ONDANSETRON 4 MG: 2 INJECTION INTRAMUSCULAR; INTRAVENOUS at 11:57

## 2021-08-13 RX ADMIN — PIPERACILLIN AND TAZOBACTAM 3375 MG: 3; .375 INJECTION, POWDER, LYOPHILIZED, FOR SOLUTION INTRAVENOUS at 18:39

## 2021-08-13 RX ADMIN — ASCORBIC ACID, VITAMIN A PALMITATE, CHOLECALCIFEROL, THIAMINE HYDROCHLORIDE, RIBOFLAVIN-5 PHOSPHATE SODIUM, PYRIDOXINE HYDROCHLORIDE, NIACINAMIDE, DEXPANTHENOL, ALPHA-TOCOPHEROL ACETATE, VITAMIN K1, FOLIC ACID, BIOTIN, CYANOCOBALAMIN: 200; 3300; 200; 6; 3.6; 6; 40; 15; 10; 150; 600; 60; 5 INJECTION, SOLUTION INTRAVENOUS at 18:44

## 2021-08-13 RX ADMIN — PROCHLORPERAZINE EDISYLATE 5 MG: 5 INJECTION INTRAMUSCULAR; INTRAVENOUS at 13:25

## 2021-08-13 RX ADMIN — INSULIN LISPRO 1 UNITS: 100 INJECTION, SOLUTION INTRAVENOUS; SUBCUTANEOUS at 21:53

## 2021-08-13 ASSESSMENT — PAIN SCALES - GENERAL
PAINLEVEL_OUTOF10: 0
PAINLEVEL_OUTOF10: 0

## 2021-08-13 NOTE — CONSULTS
Comprehensive Nutrition Assessment    Type and Reason for Visit:  Reassess    NUTRITION RECOMMENDATIONS:   1. PO Diet: Continue regular; low fat diet  2. Start calorie count. 3. ONS: Start Boost pudding BID. 4. Nutrition support:   1. Recommend continue PN Clinimix 5/20E at goal rate 70 mL per hour to provide 1680 mL total volume, 1478 calories, 84 g protein, 336 g dextrose and 3.2 mg/kg/min GIR (99% kcal and 100% protein needs met). 2.  Provide 250 ml of 20% lipids twice a week (to accommodate lipid shortage) to provide 50 g lipids and 500 calories per day x2/ week. 3. Obtain Labs: Daily Phos,Mg,K+. Weekly TG recommended. 4. Pharmacy to adjust electrolytes, MVI and Trace Elements as appropriate. NUTRITION ASSESSMENT:  Nutritional summary & status: RD consulted to start calorie count. Pt reports taking small bites and sips of meals with no significant intakes. Pts family member in room reports that pts appetite appears to be improving. TPN continues to run at goal. Noted elevated blood sugars >300 mg/dL; Noted pharmacy adjusting insulin rate. If glucose does not decrease WNL recommend modifing TPN mix from 5/20E to 5/15E to decrease dextrose intake. Recs above. RD to send Boost Pudding for pt to try to increase protein and calorie intakes as pt previously reports she does not like other ONS. RD will continue to monitor. Patient admitted d/t acute cholecystitis    PMH significant for: HTN, CVA    MALNUTRITION ASSESSMENT  Context of Malnutrition: Acute Illness   Malnutrition Status:  Moderate malnutrition  Findings of the 6 clinical characteristics of malnutrition (Minimum of 2 out of 6 clinical characteristics is required to make the diagnosis of moderate or severe Protein Calorie Malnutrition based on AND/ASPEN Guidelines):  Energy Intake: TPN to meet 99% of needs   Energy Intake Time: No significant    Weight Loss %: Unable to assess    Weight loss Time: Unable to assess -no wt hx   Body Fat Loss: Unable to Assess   Body Fat Location: Unable to assess    Body Muscle Loss: Mild Loss    Body Muscle Loss Location: Clavicles  and Shoulders   Fluid Accumulation: Unable to assess    Fluid Accumulation Location: Unable to assess     Strength: Not Performed; Not Measured     Estimated Daily Nutrient Needs:  Energy (kcal):  9334-9557 kcal/d (20-25 kcal/kg); Weight Used for Energy Requirements:  Current     Protein (g):  73-85 g/d (1.2-1.4 g/kg); Weight Used for Protein Requirements:  Ideal (61 kg)        Fluid (ml/day):  7995-9186 ml/d; Method Used for Fluid Requirements:  1 ml/kcal      OBJECTIVE DATA: Significant to nutrition assessment  · Nutrition-Focused Physical Findings: Nutrition Related Findings: +1 RUE edema, lbm 8/12   · Labs: Reviewed; POC Glu 319  · Meds: Reviewed; Phos  · Wounds: Wound Type: None     CURRENT NUTRITION THERAPIES  PN-Adult Premix 5/20 - Standard Electrolytes - Central Line  ADULT DIET;  Regular; Low Fat (less than or equal to 50 gm/day)  PN-Adult Premix 5/20 - Standard Electrolytes - Central Line     PO Intake: 0%  and 1-25%  PO Supplement Intake: None   IVF: n/a     ANTHROPOMETRICS  Current Height: 5' 7\" (170.2 cm)  Current Weight: 162 lb 14.7 oz (73.9 kg)    Admission weight: 161 lb 12.8 oz (73.4 kg)  Ideal Body Weight (lbs) (Calculated): 135 lbs (Ideal Body Weight (Kg) (Calculated): 61 kg)  Usual Bodyweight VINOD - limited wt hx    Weight Changes VINOD        BMI BMI (Calculated): 25.6    Wt Readings from Last 50 Encounters:   08/09/21 162 lb 14.7 oz (73.9 kg)   08/03/21 150 lb (68 kg)       Nutrition Diagnosis:   · Inadequate oral intake related to altered GI function as evidenced by nutrition support - parenteral nutrition      Nutrition Interventions:   Food and/or Nutrient Delivery:  Continue Current Diet, Start Oral Nutrition Supplement, Continue Current Parenteral Nutrition, Start Calorie Count  Nutrition Education/Counseling:  No recommendation at this time   Coordination of Nutrition Care:  Continue to monitor while inpatient    Goals:  Pt will tolerate TPN to meet >75% of nutrition needs.        Nutrition Monitoring and Evaluation:   Behavioral-Environmental Outcomes:  None Identified   Food/Nutrient Intake Outcomes:  Food and Nutrient Intake, Parenteral Nutrition Intake/Tolerance  Physical Signs/Symptoms Outcomes:  Biochemical Data, Weight     Brushalejo Benson, [address] [place] [address] [facility]: [phone]  Office:  [phone]

## 2021-08-13 NOTE — PROGRESS NOTES
Occupational Therapy  Facility/Department: [facility]  Daily Treatment Note/Tx  NAME: Valery Valera  : 1932  MRN: [id number]    Date of Service: 2021    Discharge Recommendations: Valery Valera scored a 19/24 on the AM-PAC ADL Inpatient form. Current research shows that an AM-PAC score of 18 or greater is typically associated with a discharge to the patient's home setting. Based on the patient's AM-PAC score, and their current ADL deficits, it is recommended that the patient have 2-3 sessions per week of Occupational Therapy at d/c to increase the patient's independence. At this time, this patient demonstrates the endurance and safety to discharge home with 24hr A and HHOT and a follow up treatment frequency of 2-3x/wk. Please see assessment section for further patient specific details. If patient discharges prior to next session this note will serve as a discharge summary. Please see below for the latest assessment towards goals. HOME HEALTH CARE: LEVEL 1 STANDARD    - Initial home health evaluation to occur within 24-48 hours, in patient home   - Therapy to evaluate with goal of regaining prior level of functioning   - Therapy to evaluate if patient has [address] needs for personal care    OT Equipment Recommendations  Other: Defer to next level of care    Assessment   Performance deficits / Impairments: Decreased functional mobility ; Decreased ADL status; Decreased ROM; Decreased strength;Decreased endurance;Decreased safe awareness;Decreased posture;Decreased coordination;Decreased fine motor control  Assessment: Pt very limited by fatigue this date, staying in bed throughout grooming and stating she did not want to participate in [address]. Pt completed grooming with SPV and setup while reclined in bed. Therapist educated pt and sons on [address]. Recommend home with 24hr A and HHOT. Will follow OT POC.   Treatment Diagnosis: Decreased ROM and endurance resulting in decreased ADL independence  Prognosis: Good  OT Education: OT Role;Home Exercise Program;Family Education; ADL Adaptive Strategies  Patient Education: Pt and son verbalized understanding  REQUIRES OT FOLLOW UP: Yes  Activity Tolerance  Activity Tolerance: Patient limited by fatigue  Activity Tolerance: Pt would not get out of bed or participate in [address] saying she was tired  Safety Devices  Safety Devices in place: Yes  Type of devices: Left in chair;Chair alarm in place;Call light within reach;Nurse notified         Patient Diagnosis(es): The encounter diagnosis was Acute cholecystitis. has a past medical history of CVA (cerebral vascular accident) ([facility] [address] and Hypertension. has a past surgical history that includes Cholecystectomy, laparoscopic (N/A, 08/06/2021); ERCP (8/12/2021); ERCP (8/12/2021); ERCP (8/12/2021); and ERCP (8/12/2021). Restrictions  Restrictions/Precautions  Restrictions/Precautions: Up as Tolerated  Position Activity Restriction  Other position/activity restrictions: up as tolerated  Subjective   General  Chart Reviewed: Yes  Patient assessed for rehabilitation services?: Yes  Additional Pertinent Hx: Pt is 81 yo female presenting from home to [facility] with c/o abdominal pain. Pt was seen 2 days prior to admission with cholecystitis, but decided to follow up in OP for maintenance. Colorectal surgery= laparoscopic cholecystectomy- gallbladder stone and inflammation, stone fragments removed and gallbaldder aspirated. Cardiology consult= pt stable. Gastroenterology consult= monitor ZAYRA drain output, possible need for ERCP with plastic biliary stent. Abdominal/Pelvic CT= probable acute cholecystitis with cholelithiasis; Chest XR= mild pulmonary vascular cocngestion and R basilar atectasis; Chest XR= R arm PICC terminated within cavoatrial junction, appropriate position, mild basilar pleural and parenchymal disease. PMHx= CVA and HTN. No surgical Hx on fle.   Family / Caregiver Present: Yes (Pt's 2 sons present throughout session)  Referring Practitioner: Rocky Barroso DO  Diagnosis: Acute cholecystitis  Subjective  Subjective: Pt reclined supine in bed upon OT arrival. Pt agreeable to OT evaluation with encouragement. Pt reported she was nauseous and very tired. Ended session when pt stated, \"Thank you for coming, goodbye now,\" in the middle of BUE HEP education. Orientation  Orientation  Overall Orientation Status: Impaired  Orientation Level: Oriented to place;Oriented to person;Disoriented to situation;Disoriented to time  Objective    ADL  Grooming: Supervision;Setup (reclined in bed to brush hair and wash face)                                                        Type of ROM/Therapeutic Exercise  Type of ROM/Therapeutic Exercise: AROM  Comment: Therapist educated pt and sons on BUE HEP- AROM shoulder flexion and abduction x10                    Plan   Plan  Times per week: 2-5  Times per day: Daily  Current Treatment Recommendations: ROM, Strengthening, Endurance Training, Functional Mobility Training, Balance Training, Safety Education & Training, Patient/Caregiver Education & Training, Equipment Evaluation, Education, & procurement, Self-Care / ADL  G-Code     OutComes Score                                                  AM-PAC Score        AM-[facility] Inpatient Daily Activity Raw Score: 19 (08/13/21 1548)  AM-PAC Inpatient ADL T-Scale Score : 40.22 (08/13/21 1548)  ADL Inpatient CMS 0-100% Score: 42.8 (08/13/21 1548)  ADL Inpatient CMS G-Code Modifier : CK (08/13/21 1548)    Goals  Short term goals  Time Frame for Short term goals: By discharge  Short term goal 1: Pt will CGA to complete toileting tasks- 8/13 pt declined  Short term goal 2: Pt will require mod A to complete LB dressing- 8/13 pt declined  Short term goal 3: Pt will require SBA for sit<>stand transfers- 8/13 pt declined  Patient Goals   Patient goals :  To go home       Therapy Time   Individual Concurrent Group Co-treatment   Time In 1401         Time Out 1415         Minutes 14              Time Code Treatment Minutes:  [address]/OT  Kandi Sage, OTR/L 8528  Therapist was present, directed the patient's care, made skilled judgement, was responsible for assessment and treatment of the patient.

## 2021-08-13 NOTE — PROGRESS NOTES
units / day. Started conservatively as pt is insulin naive with no h/o DM. Will monitor glucoses next 24 hrs, and will adjust in TPN tomorrow as needed.  Patient will receive MVI 10 mL/day on Mon-Wed-Fri only (due to national shortage) & Trace Elements 1 mL/day with PN daily.  Labs will be monitored daily and PN will be re-ordered daily. Weekly triglyceride ordered (first 8/16) per PN protocol.     Please call with questions--  Thanks--  Nora Maddox, PharmD, BCPS, BCGP  [id number] ([facility])   8/13/2021 9:12 AM

## 2021-08-13 NOTE — PROGRESS NOTES
Pt has been A&Ox4, VSS, lungs clear though diminished in bases, heart continuing to be in A-fib, metoprolol and lovenox already ordered and being given per order. Pt has had 3 BMs this shift, loose to soft, pt is mainly having liquids intake, and taking in less than 25% of all meals, calorie count is in place. TPN continuing. Pt has been reporting an \"upset stomach\" that she feels like something is pressing into her epigastric area. Pt was given zofran and then the compazine that was added on with little relief assisted up to chair but only wanted to stay in it for a half hour. Surgical team kept aware, no emesis. Will continue to monitor.

## 2021-08-13 NOTE — PROGRESS NOTES
stent placement yesterday. ZAYRA with decreasing output. Will plan for outpt ERCP 6-8 weeks. - Continue ABx  - Continue adv Low fat diet, continue TPN for nutritional support  - Continue lovenox  - PT/OT, 15/19 respectively. SW following. Pt will likely need to go with TPN for nutritional support    Ole Degree, [address] [facility]  8/13/2021  6:27 AM  mitul    I performed a history and physical examination of the patient and discussed management with the resident. I reviewed the resident's note and agree with the documented findings and plan of care. Appreciate GI assistance.      Naila Ortiz M.D.  8/13/21   10:35 AM

## 2021-08-13 NOTE — PROGRESS NOTES
[address] [place] [address]  GI  Gastroenterology Progress Note  Safia Posadas is a 80 y.o. female patient. 1. Acute cholecystitis        SUBJECTIVE:    No abdominal pain. No GI bleeding. Physical    VITALS:  /70   Pulse 103   Temp 98.1 °F (36.7 °C) (Oral)   Resp 16   Ht 5' 7\" (1.702 m)   Wt 162 lb 14.7 oz (73.9 kg)   SpO2 95%   BMI 25.52 kg/m²   TEMPERATURE:  Current - Temp: 98.1 °F (36.7 °C); Max - Temp  Av.8 °F (36.6 °C)  Min: 97.5 °F (36.4 °C)  Max: 98.1 °F (36.7 °C)    Abdomen soft, ND, NT, no HSM, Bowel sounds normal     Data      Recent Labs     21  0512 21  0552   WBC 10.0 10.3   HGB 11.2* 10.4*   HCT 32.2* 29.7*   MCV 91.4 91.8   * 352     Recent Labs     21  0512 21  0600 21  0552    136 136   K 3.2* 3.4* 4.5    102 101   CO2 24 24 23   PHOS 2.5 2.6 2.9   BUN 19 19 24*   CREATININE 1.0 0.9 0.9     Recent Labs     21  0512   AST 23   ALT 16   BILIDIR <0.2   BILITOT 0.4   ALKPHOS 120     No results for input(s): LIPASE, AMYLASE in the last 72 hours. ASSESSMENT   1. Bile leak status post ERCP on 2021 with sphincterotomy and stent placement. Since the stent placement biliary drainage has been minimal.  No apparent complications from ERCP        PLAN    Repeat ERCP in 6 to 8 weeks to document resolution of bile leak. Will sign off.   Thanks      Mima Suarez MD  [address] [place] [address] [facility]

## 2021-08-13 NOTE — CARE COORDINATION
Cm following, spoke with Pt and son at bedside made them aware [facility] unable to take pt at DC due to TPN. New referral to [facility] sent per son. [address] following for Wm. Sabino Vilchis and TPN at home.  Electronically signed by Minerva Valdez RN on 8/13/2021 at 1:29 PM  [phone]

## 2021-08-14 PROBLEM — K81.0 ACUTE CHOLECYSTITIS: Status: ACTIVE | Noted: 2021-08-05

## 2021-08-14 LAB
ALBUMIN SERPL-MCNC: 2.6 G/DL (ref 3.4–5)
ANION GAP SERPL CALCULATED.3IONS-SCNC: 9 MMOL/L (ref 3–16)
BASOPHILS ABSOLUTE: 0 K/UL (ref 0–0.2)
BASOPHILS RELATIVE PERCENT: 0.1 %
BUN BLDV-MCNC: 24 MG/DL (ref 7–20)
CALCIUM SERPL-MCNC: 8.3 MG/DL (ref 8.3–10.6)
CHLORIDE BLD-SCNC: 105 MMOL/L (ref 99–110)
CO2: 24 MMOL/L (ref 21–32)
CREAT SERPL-MCNC: 0.8 MG/DL (ref 0.6–1.2)
EOSINOPHILS ABSOLUTE: 0.2 K/UL (ref 0–0.6)
EOSINOPHILS RELATIVE PERCENT: 1.2 %
GFR AFRICAN AMERICAN: >60
GFR NON-AFRICAN AMERICAN: >60
GLUCOSE BLD-MCNC: 122 MG/DL (ref 70–99)
GLUCOSE BLD-MCNC: 124 MG/DL (ref 70–99)
GLUCOSE BLD-MCNC: 138 MG/DL (ref 70–99)
GLUCOSE BLD-MCNC: 157 MG/DL (ref 70–99)
HCT VFR BLD CALC: 30.5 % (ref 36–48)
HEMOGLOBIN: 10.6 G/DL (ref 12–16)
LYMPHOCYTES ABSOLUTE: 0.8 K/UL (ref 1–5.1)
LYMPHOCYTES RELATIVE PERCENT: 6.5 %
MAGNESIUM: 2 MG/DL (ref 1.8–2.4)
MCH RBC QN AUTO: 31.9 PG (ref 26–34)
MCHC RBC AUTO-ENTMCNC: 34.9 G/DL (ref 31–36)
MCV RBC AUTO: 91.6 FL (ref 80–100)
MONOCYTES ABSOLUTE: 2.5 K/UL (ref 0–1.3)
MONOCYTES RELATIVE PERCENT: 19.5 %
NEUTROPHILS ABSOLUTE: 9.4 K/UL (ref 1.7–7.7)
NEUTROPHILS RELATIVE PERCENT: 72.7 %
PDW BLD-RTO: 15.2 % (ref 12.4–15.4)
PERFORMED ON: ABNORMAL
PHOSPHORUS: 2.7 MG/DL (ref 2.5–4.9)
PLATELET # BLD: 378 K/UL (ref 135–450)
PMV BLD AUTO: 8 FL (ref 5–10.5)
POTASSIUM SERPL-SCNC: 4.2 MMOL/L (ref 3.5–5.1)
RBC # BLD: 3.33 M/UL (ref 4–5.2)
SODIUM BLD-SCNC: 138 MMOL/L (ref 136–145)
WBC # BLD: 13 K/UL (ref 4–11)

## 2021-08-14 PROCEDURE — 1200000000 HC SEMI PRIVATE

## 2021-08-14 PROCEDURE — 99024 POSTOP FOLLOW-UP VISIT: CPT | Performed by: SURGERY

## 2021-08-14 PROCEDURE — 6370000000 HC RX 637 (ALT 250 FOR IP): Performed by: STUDENT IN AN ORGANIZED HEALTH CARE EDUCATION/TRAINING PROGRAM

## 2021-08-14 PROCEDURE — 80069 RENAL FUNCTION PANEL: CPT

## 2021-08-14 PROCEDURE — 83735 ASSAY OF MAGNESIUM: CPT

## 2021-08-14 PROCEDURE — 2580000003 HC RX 258: Performed by: STUDENT IN AN ORGANIZED HEALTH CARE EDUCATION/TRAINING PROGRAM

## 2021-08-14 PROCEDURE — 2500000003 HC RX 250 WO HCPCS: Performed by: STUDENT IN AN ORGANIZED HEALTH CARE EDUCATION/TRAINING PROGRAM

## 2021-08-14 PROCEDURE — 81001 URINALYSIS AUTO W/SCOPE: CPT

## 2021-08-14 PROCEDURE — 6360000002 HC RX W HCPCS: Performed by: STUDENT IN AN ORGANIZED HEALTH CARE EDUCATION/TRAINING PROGRAM

## 2021-08-14 PROCEDURE — 6360000002 HC RX W HCPCS: Performed by: SURGERY

## 2021-08-14 PROCEDURE — 85025 COMPLETE CBC W/AUTO DIFF WBC: CPT

## 2021-08-14 RX ORDER — DEXTROSE MONOHYDRATE 50 MG/ML
INJECTION, SOLUTION INTRAVENOUS CONTINUOUS
Status: DISCONTINUED | OUTPATIENT
Start: 2021-08-14 | End: 2021-08-16 | Stop reason: HOSPADM

## 2021-08-14 RX ORDER — DOCUSATE SODIUM 100 MG/1
100 CAPSULE, LIQUID FILLED ORAL 2 TIMES DAILY
Status: DISCONTINUED | OUTPATIENT
Start: 2021-08-14 | End: 2021-08-16 | Stop reason: HOSPADM

## 2021-08-14 RX ADMIN — ACETAMINOPHEN 1000 MG: 500 TABLET, FILM COATED ORAL at 18:49

## 2021-08-14 RX ADMIN — METOPROLOL TARTRATE 25 MG: 25 TABLET, FILM COATED ORAL at 22:51

## 2021-08-14 RX ADMIN — AMLODIPINE BESYLATE 10 MG: 10 TABLET ORAL at 08:25

## 2021-08-14 RX ADMIN — PIPERACILLIN AND TAZOBACTAM 3375 MG: 3; .375 INJECTION, POWDER, LYOPHILIZED, FOR SOLUTION INTRAVENOUS at 18:33

## 2021-08-14 RX ADMIN — ATORVASTATIN CALCIUM 40 MG: 40 TABLET, FILM COATED ORAL at 22:52

## 2021-08-14 RX ADMIN — ACETAMINOPHEN 1000 MG: 500 TABLET, FILM COATED ORAL at 22:52

## 2021-08-14 RX ADMIN — ENOXAPARIN SODIUM 40 MG: 40 INJECTION SUBCUTANEOUS at 08:25

## 2021-08-14 RX ADMIN — PIPERACILLIN AND TAZOBACTAM 3375 MG: 3; .375 INJECTION, POWDER, LYOPHILIZED, FOR SOLUTION INTRAVENOUS at 03:11

## 2021-08-14 RX ADMIN — METOPROLOL TARTRATE 25 MG: 25 TABLET, FILM COATED ORAL at 08:25

## 2021-08-14 RX ADMIN — Medication 10 ML: at 16:33

## 2021-08-14 RX ADMIN — DOCUSATE SODIUM 100 MG: 100 CAPSULE ORAL at 08:25

## 2021-08-14 RX ADMIN — PIPERACILLIN AND TAZOBACTAM 3375 MG: 3; .375 INJECTION, POWDER, LYOPHILIZED, FOR SOLUTION INTRAVENOUS at 10:45

## 2021-08-14 RX ADMIN — SODIUM PHOSPHATE, MONOBASIC, MONOHYDRATE 15 MMOL: 276; 142 INJECTION, SOLUTION INTRAVENOUS at 16:31

## 2021-08-14 RX ADMIN — DEXTROSE MONOHYDRATE: 5 INJECTION, SOLUTION INTRAVENOUS at 06:49

## 2021-08-14 ASSESSMENT — PAIN SCALES - GENERAL
PAINLEVEL_OUTOF10: 0
PAINLEVEL_OUTOF10: 2

## 2021-08-14 NOTE — PROGRESS NOTES
Clinical Pharmacy Progress Note  Pharmacy to dose PN    Admit date: 8/5/2021     Subjective/Objective:  Pt is a 80yof with a PMHx that includes HTN & CVA who is admitted with acute cholecystitis, now s/p laparoscopic subtotal cholecystectomy (done 8/6). Post-op course has been complicated by bile leak with bilious output from ZAYRA. S/p ERCP with stent placement (done 8/12). Interval update:  PICC dislodged / removed overnight. Per Dr. Rowdy Llanes - no TPN to be ordered today. RD consulted for calorie count to determine if PICC needs to be replaced for further TPN. Pharmacy is consulted to dose PN per Dr. Cuong Judge  Current diet = Regular low fat    Current antibiotics:  Zosyn 3.375g IV EI q8h (8/6-current) - day #9    Assessment/Plan:  1)  Parenteral Nutrition:  Pharmacy to dose   PICC dislodged / removed overnight. No TPN for now per surgery. Will continue to monitor for plan re: further need for TPN.     Please call with questions--  Thanks--  Nora Maddox, PharmD, [address]  [id number] ([facility])   8/14/2021 10:09 AM

## 2021-08-14 NOTE — PROGRESS NOTES
Upon entering room to take pt to the BR, nurse found that pt's PICC line was out of her arm about 5-6 inches. Pt states it may have been caught on something. TPN stopped. PICC removed and dressing placed. Surgical resident paged and made aware. Order received to start dextrose and instructed to place a second peripheral.  Another RN to attempt peripheral after this one was unsuccessful. Will continue to monitor.   Electronically signed by María Pulliam RN on 8/14/21 at 6:30 AM EDT

## 2021-08-14 NOTE — PROGRESS NOTES
Acapella device not done w/ pt at this time. Pt's son states that she has been using acapella and incentive spirometry frequently.  Pt is on room air and no distress

## 2021-08-14 NOTE — PROGRESS NOTES
Colorectal Surgery   Daily Progress Note  Safia Posadas  CC:  gallbladder empyema     SUBJECTIVE:  PICC line noted to be dislodged this am. Pt not feeling well but unable to state what is wrong. Denies abdominal pain, n/v. Pt having BMs. Pt states that she still has no appetite. ROS:   A 14 point review of systems was conducted, significant findings as noted in HPI. All other systems negative. OBJECTIVE:   Infusions:   dextrose 75 mL/hr at 08/14/21 0649    dextrose      PN-Adult Premix 5/20 - Standard Electrolytes - Central Line Stopped (08/14/21 0530)    sodium chloride      sodium chloride          I/O:I/O last 3 completed shifts: In: 2423.6 [P.O.:60; I.V.:5; IV Piggyback:191.4]  Out: 40 [Drains:40]           Wt Readings from Last 1 Encounters:   08/09/21 162 lb 14.7 oz (73.9 kg)     LABS:    Recent Labs     08/13/21  0552   WBC 10.3   HGB 10.4*   HCT 29.7*   MCV 91.8        Recent Labs     08/12/21  0600 08/13/21  0552    136   K 3.4* 4.5    101   CO2 24 23   PHOS 2.6 2.9   BUN 19 24*   CREATININE 0.9 0.9     No results for input(s): AST, ALT, ALB, BILIDIR, BILITOT, ALKPHOS in the last 72 hours. No results for input(s): LIPASE, AMYLASE in the last 72 hours. No results for input(s): PROT, INR, APTT in the last 72 hours. No results for input(s): CKTOTAL, CKMB, CKMBINDEX, TROPONINI in the last 72 hours.          Exam  BP (!) 141/83   Pulse 94   Temp 97.8 °F (36.6 °C) (Oral)   Resp 16   Ht 5' 7\" (1.702 m)   Wt 162 lb 14.7 oz (73.9 kg)   SpO2 94%   BMI 25.52 kg/m²      General Appearance: alert, appears stated age and cooperative  Cardiovascular: RRR, brisk capillary refill  Abdomen: Soft, appropriately-tender, incisions c/d/i and well approximated with Dermabond, ZAYRA drain in place with bilious output  Skin: warm and dry, no rashes  Extremities: no edema, no cyanosis  Neuro: A&Ox3, no focal deficits, sensation intact    ASSESSMENT/PLAN:   Pt. is a 80 y.o. female s/p laparoscopic subtotal cholecystectomy (8/6). POD6.    -ERCP with stent placement yesterday. ZAYRA with decreasing output. Will plan for outpt ERCP 6-8 weeks. - Continue ABx through the weekend  - Continue Low fat diet, will add nutritional supplements  - PICC line dislodged. TPN stopped and D5 started. Will access calorie counts to determine if pt will require PICC placement on monday  - Continue lovenox  - PT/OT, 15/19 respectively. SW following.  Pt will likely need to go with TPN for nutritional support    Jeff Lisa MD, PGY-1  08/14/21 6:55 AM  Pager: [phone]

## 2021-08-14 NOTE — PROGRESS NOTES
NUTRITION NOTE: Calorie Count      Type and Reason for Visit: Reassess    Diet Orders / Intake / Nutrition Support  Current diet/supplement order: ADULT DIET; Regular; Low Fat (less than or equal to 50 gm/day)  PN-Adult Premix 5/20 - Standard Electrolytes - Central Line  Adult Oral Nutrition Supplement; Fortified Pudding Oral Supplement  Adult Oral Nutrition Supplement; Standard High Calorie/High Protein Oral Supplement       COMPARATIVE STANDARDS  Estimated Total Kcals/Day : 20-25 Current Bodyweight (74 kg) 6412-9581 kcal    Estimated Total Protein (g/day) : 1.2-1.4 Ideal Bodyweight  (61 kg) 73-85 g/day  Estimated Daily Total Fluid (ml/day): 9815-7949 mL per day     Date Consumed PO Intake Kcal %   Kcal met PO Intake grams protein %  Protein met   Comments   8/13 -  -  Sips of broth, bites of jello                                   **Results will be posted as available. Raz Muse, [address] [place] [address]  [place]:  [phone]  Office:  [phone]    **Start 8/13- End 8/16. Please record all PO Intake on meal tray tickets as % of each item or RN/PCA may note in I/O % of items consumed for dietitian evaluation.

## 2021-08-15 ENCOUNTER — APPOINTMENT (OUTPATIENT)
Dept: GENERAL RADIOLOGY | Age: 86
DRG: 418 | End: 2021-08-15
Payer: MEDICARE

## 2021-08-15 LAB
ALBUMIN SERPL-MCNC: 2.6 G/DL (ref 3.4–5)
ALP BLD-CCNC: 129 U/L (ref 40–129)
ALT SERPL-CCNC: 15 U/L (ref 10–40)
ANION GAP SERPL CALCULATED.3IONS-SCNC: 9 MMOL/L (ref 3–16)
AST SERPL-CCNC: 20 U/L (ref 15–37)
BACTERIA: ABNORMAL /HPF
BASOPHILS ABSOLUTE: 0 K/UL (ref 0–0.2)
BASOPHILS RELATIVE PERCENT: 0.2 %
BILIRUB SERPL-MCNC: 0.5 MG/DL (ref 0–1)
BILIRUBIN DIRECT: <0.2 MG/DL (ref 0–0.3)
BILIRUBIN URINE: NEGATIVE
BILIRUBIN, INDIRECT: ABNORMAL MG/DL (ref 0–1)
BLOOD, URINE: ABNORMAL
BUN BLDV-MCNC: 19 MG/DL (ref 7–20)
CALCIUM SERPL-MCNC: 8.3 MG/DL (ref 8.3–10.6)
CHLORIDE BLD-SCNC: 101 MMOL/L (ref 99–110)
CLARITY: CLEAR
CO2: 22 MMOL/L (ref 21–32)
COLOR: YELLOW
CREAT SERPL-MCNC: 0.9 MG/DL (ref 0.6–1.2)
EOSINOPHILS ABSOLUTE: 0.2 K/UL (ref 0–0.6)
EOSINOPHILS RELATIVE PERCENT: 1.6 %
EPITHELIAL CELLS, UA: ABNORMAL /HPF (ref 0–5)
GFR AFRICAN AMERICAN: >60
GFR NON-AFRICAN AMERICAN: 59
GLUCOSE BLD-MCNC: 120 MG/DL (ref 70–99)
GLUCOSE BLD-MCNC: 135 MG/DL (ref 70–99)
GLUCOSE BLD-MCNC: 141 MG/DL (ref 70–99)
GLUCOSE BLD-MCNC: 147 MG/DL (ref 70–99)
GLUCOSE BLD-MCNC: 154 MG/DL (ref 70–99)
GLUCOSE BLD-MCNC: 166 MG/DL (ref 70–99)
GLUCOSE URINE: NEGATIVE MG/DL
HCT VFR BLD CALC: 30.3 % (ref 36–48)
HEMOGLOBIN: 10.5 G/DL (ref 12–16)
INR BLD: 1.12 (ref 0.88–1.12)
KETONES, URINE: NEGATIVE MG/DL
LEUKOCYTE ESTERASE, URINE: ABNORMAL
LYMPHOCYTES ABSOLUTE: 0.7 K/UL (ref 1–5.1)
LYMPHOCYTES RELATIVE PERCENT: 5.7 %
MAGNESIUM: 1.9 MG/DL (ref 1.8–2.4)
MCH RBC QN AUTO: 31.9 PG (ref 26–34)
MCHC RBC AUTO-ENTMCNC: 34.8 G/DL (ref 31–36)
MCV RBC AUTO: 91.5 FL (ref 80–100)
MICROSCOPIC EXAMINATION: YES
MONOCYTES ABSOLUTE: 2.1 K/UL (ref 0–1.3)
MONOCYTES RELATIVE PERCENT: 17.5 %
NEUTROPHILS ABSOLUTE: 8.9 K/UL (ref 1.7–7.7)
NEUTROPHILS RELATIVE PERCENT: 75 %
NITRITE, URINE: NEGATIVE
PDW BLD-RTO: 14.8 % (ref 12.4–15.4)
PERFORMED ON: ABNORMAL
PH UA: 7 (ref 5–8)
PHOSPHORUS: 2.9 MG/DL (ref 2.5–4.9)
PLATELET # BLD: 373 K/UL (ref 135–450)
PMV BLD AUTO: 8.4 FL (ref 5–10.5)
POTASSIUM SERPL-SCNC: 4.1 MMOL/L (ref 3.5–5.1)
PROTEIN UA: NEGATIVE MG/DL
PROTHROMBIN TIME: 12.7 SEC (ref 9.9–12.7)
RBC # BLD: 3.31 M/UL (ref 4–5.2)
RBC UA: ABNORMAL /HPF (ref 0–4)
RENAL EPITHELIAL, UA: ABNORMAL /HPF (ref 0–1)
SODIUM BLD-SCNC: 132 MMOL/L (ref 136–145)
SPECIFIC GRAVITY UA: <=1.005 (ref 1–1.03)
TOTAL PROTEIN: 5.9 G/DL (ref 6.4–8.2)
TROPONIN: <0.01 NG/ML
URINE REFLEX TO CULTURE: ABNORMAL
URINE TYPE: ABNORMAL
UROBILINOGEN, URINE: 0.2 E.U./DL
WBC # BLD: 11.9 K/UL (ref 4–11)
WBC UA: ABNORMAL /HPF (ref 0–5)
YEAST: PRESENT /HPF

## 2021-08-15 PROCEDURE — 6370000000 HC RX 637 (ALT 250 FOR IP): Performed by: STUDENT IN AN ORGANIZED HEALTH CARE EDUCATION/TRAINING PROGRAM

## 2021-08-15 PROCEDURE — 80076 HEPATIC FUNCTION PANEL: CPT

## 2021-08-15 PROCEDURE — 36415 COLL VENOUS BLD VENIPUNCTURE: CPT

## 2021-08-15 PROCEDURE — 2500000003 HC RX 250 WO HCPCS

## 2021-08-15 PROCEDURE — 99024 POSTOP FOLLOW-UP VISIT: CPT | Performed by: SURGERY

## 2021-08-15 PROCEDURE — 2580000003 HC RX 258: Performed by: STUDENT IN AN ORGANIZED HEALTH CARE EDUCATION/TRAINING PROGRAM

## 2021-08-15 PROCEDURE — 6360000002 HC RX W HCPCS: Performed by: STUDENT IN AN ORGANIZED HEALTH CARE EDUCATION/TRAINING PROGRAM

## 2021-08-15 PROCEDURE — 6370000000 HC RX 637 (ALT 250 FOR IP)

## 2021-08-15 PROCEDURE — 6360000002 HC RX W HCPCS

## 2021-08-15 PROCEDURE — 85025 COMPLETE CBC W/AUTO DIFF WBC: CPT

## 2021-08-15 PROCEDURE — 85610 PROTHROMBIN TIME: CPT

## 2021-08-15 PROCEDURE — 71045 X-RAY EXAM CHEST 1 VIEW: CPT

## 2021-08-15 PROCEDURE — 6360000002 HC RX W HCPCS: Performed by: SURGERY

## 2021-08-15 PROCEDURE — 80069 RENAL FUNCTION PANEL: CPT

## 2021-08-15 PROCEDURE — 84484 ASSAY OF TROPONIN QUANT: CPT

## 2021-08-15 PROCEDURE — 1200000000 HC SEMI PRIVATE

## 2021-08-15 PROCEDURE — 2580000003 HC RX 258

## 2021-08-15 PROCEDURE — 83735 ASSAY OF MAGNESIUM: CPT

## 2021-08-15 RX ORDER — MAGNESIUM SULFATE IN WATER 40 MG/ML
2000 INJECTION, SOLUTION INTRAVENOUS ONCE
Status: COMPLETED | OUTPATIENT
Start: 2021-08-15 | End: 2021-08-15

## 2021-08-15 RX ORDER — FAMOTIDINE 20 MG/1
20 TABLET, FILM COATED ORAL DAILY
Status: DISCONTINUED | OUTPATIENT
Start: 2021-08-16 | End: 2021-08-16 | Stop reason: HOSPADM

## 2021-08-15 RX ORDER — CALCIUM CARBONATE 200(500)MG
500 TABLET,CHEWABLE ORAL 3 TIMES DAILY PRN
Status: DISCONTINUED | OUTPATIENT
Start: 2021-08-15 | End: 2021-08-16 | Stop reason: HOSPADM

## 2021-08-15 RX ADMIN — INSULIN LISPRO 1 UNITS: 100 INJECTION, SOLUTION INTRAVENOUS; SUBCUTANEOUS at 17:37

## 2021-08-15 RX ADMIN — DOCUSATE SODIUM 100 MG: 100 CAPSULE ORAL at 08:54

## 2021-08-15 RX ADMIN — ATORVASTATIN CALCIUM 40 MG: 40 TABLET, FILM COATED ORAL at 20:54

## 2021-08-15 RX ADMIN — INSULIN LISPRO 1 UNITS: 100 INJECTION, SOLUTION INTRAVENOUS; SUBCUTANEOUS at 14:34

## 2021-08-15 RX ADMIN — MAGNESIUM SULFATE HEPTAHYDRATE 2000 MG: 40 INJECTION, SOLUTION INTRAVENOUS at 08:58

## 2021-08-15 RX ADMIN — DEXTROSE MONOHYDRATE: 5 INJECTION, SOLUTION INTRAVENOUS at 22:00

## 2021-08-15 RX ADMIN — PIPERACILLIN AND TAZOBACTAM 3375 MG: 3; .375 INJECTION, POWDER, LYOPHILIZED, FOR SOLUTION INTRAVENOUS at 11:55

## 2021-08-15 RX ADMIN — DEXTROSE MONOHYDRATE: 5 INJECTION, SOLUTION INTRAVENOUS at 08:35

## 2021-08-15 RX ADMIN — DEXTROSE MONOHYDRATE: 5 INJECTION, SOLUTION INTRAVENOUS at 11:47

## 2021-08-15 RX ADMIN — Medication 10 ML: at 08:55

## 2021-08-15 RX ADMIN — PIPERACILLIN AND TAZOBACTAM 3375 MG: 3; .375 INJECTION, POWDER, LYOPHILIZED, FOR SOLUTION INTRAVENOUS at 18:17

## 2021-08-15 RX ADMIN — ENOXAPARIN SODIUM 40 MG: 40 INJECTION SUBCUTANEOUS at 08:55

## 2021-08-15 RX ADMIN — PIPERACILLIN AND TAZOBACTAM 3375 MG: 3; .375 INJECTION, POWDER, LYOPHILIZED, FOR SOLUTION INTRAVENOUS at 02:28

## 2021-08-15 RX ADMIN — METOPROLOL TARTRATE 25 MG: 25 TABLET, FILM COATED ORAL at 20:53

## 2021-08-15 RX ADMIN — METOPROLOL TARTRATE 25 MG: 25 TABLET, FILM COATED ORAL at 08:54

## 2021-08-15 RX ADMIN — INSULIN LISPRO 1 UNITS: 100 INJECTION, SOLUTION INTRAVENOUS; SUBCUTANEOUS at 20:54

## 2021-08-15 RX ADMIN — Medication 5 ML: at 21:00

## 2021-08-15 RX ADMIN — SODIUM PHOSPHATE, MONOBASIC, MONOHYDRATE 10 MMOL: 276; 142 INJECTION, SOLUTION INTRAVENOUS at 10:59

## 2021-08-15 RX ADMIN — ANTACID TABLETS 500 MG: 500 TABLET, CHEWABLE ORAL at 14:49

## 2021-08-15 RX ADMIN — FAMOTIDINE 20 MG: 10 INJECTION, SOLUTION INTRAVENOUS at 14:28

## 2021-08-15 RX ADMIN — AMLODIPINE BESYLATE 10 MG: 10 TABLET ORAL at 08:54

## 2021-08-15 RX ADMIN — Medication 10 ML: at 10:59

## 2021-08-15 ASSESSMENT — PAIN SCALES - WONG BAKER: WONGBAKER_NUMERICALRESPONSE: 0

## 2021-08-15 ASSESSMENT — PAIN SCALES - GENERAL: PAINLEVEL_OUTOF10: 0

## 2021-08-15 NOTE — PROGRESS NOTES
Alert, oriented to situation. Up to bathroom several times urinating and usually a small amount of liquid stool each time. Was able to get clean catch urine and sent sample to lab. Patient described pressure/pain feeling in belly and it is tender on palpation. Declined prn med. ZAYRA drain compressed, scant drainage.

## 2021-08-15 NOTE — PROGRESS NOTES
Clinical Pharmacy Progress Note  Pharmacy to dose PN    Admit date: 8/5/2021     Subjective/Objective:  Pt is a 80yof with a PMHx that includes HTN & CVA who is admitted with acute cholecystitis, now s/p laparoscopic subtotal cholecystectomy (done 8/6). Post-op course has been complicated by bile leak with bilious output from ZAYRA. S/p ERCP with stent placement (done 8/12). Interval update:  PICC dislodged / removed 8/14 early AM.  Per Dr. Aden Singh - no TPN to be ordered this weekend. RD consulted for calorie count to determine if PICC needs to be replaced for further TPN. Pharmacy is consulted to dose PN per Dr. Kevin Carlisle  Current diet = Regular low fat    Current antibiotics:  Zosyn 3.375g IV EI q8h (8/6-current) - day #9    Assessment/Plan:  1)  Parenteral Nutrition:  Pharmacy to dose   PICC dislodged / remove. No TPN for now per surgery. Will continue to monitor for plan re: further need for TPN.     Please call with questions--  Thanks--  Albert Harada, PharmD, BCPS, BCGP  [id number] ([facility])   8/15/2021 10:52 AM

## 2021-08-15 NOTE — PROGRESS NOTES
Patient alert and oriented. VSS. Pt ambulating to bathroom several times with walker and stand by assist. Tolerated well. Complaining of intermittent chest pain. MD aware. ZAYRA drain site clean dry intact. Pt up in the chair. Call light within reach. Patient using the call light properly.

## 2021-08-15 NOTE — PROGRESS NOTES
Colorectal Surgery   Daily Progress Note  Kylah Wilkerson  CC:  Symptomatic cholelithiasis    SUBJECTIVE:  No acute events overnight. Patient remains afebrile, HDS with resolution of tachycardia overnight from yesterday. ROS:   A 14 point review of systems was conducted, significant findings as noted in HPI. All other systems negative. OBJECTIVE:   Infusions:   dextrose 75 mL/hr at 08/14/21 0649    dextrose      sodium chloride      sodium chloride          I/O:I/O last 3 completed shifts: In: 2064.4 [P.O.:390; I.V.:1175; IV Piggyback:36]  Out: 530 [Urine:500; Drains:30]           Wt Readings from Last 1 Encounters:   08/09/21 162 lb 14.7 oz (73.9 kg)     LABS:    Recent Labs     08/14/21  0643 08/15/21  0523   WBC 13.0* 11.9*   HGB 10.6* 10.5*   HCT 30.5* 30.3*   MCV 91.6 91.5    373     Recent Labs     08/13/21  0552 08/14/21  0643    138   K 4.5 4.2    105   CO2 23 24   PHOS 2.9 2.7   BUN 24* 24*   CREATININE 0.9 0.8     No results for input(s): AST, ALT, ALB, BILIDIR, BILITOT, ALKPHOS in the last 72 hours. No results for input(s): LIPASE, AMYLASE in the last 72 hours. No results for input(s): PROT, INR, APTT in the last 72 hours. No results for input(s): CKTOTAL, CKMB, CKMBINDEX, TROPONINI in the last 72 hours. Exam  BP (!) 143/89   Pulse 99   Temp 97.4 °F (36.3 °C) (Oral)   Resp 19   Ht 5' 7\" (1.702 m)   Wt 162 lb 14.7 oz (73.9 kg)   SpO2 96%   BMI 25.52 kg/m²      General Appearance: alert, appears stated age and cooperative  Cardiovascular: RRR, brisk capillary refill  Abdomen: Soft, appropriately-tender, incisions c/d/i and well approximated with Dermabond, ZAYRA drain in place with minimal bilious output  Skin: warm and dry, no rashes  Extremities: no edema, no cyanosis  Neuro: A&Ox3, no focal deficits, sensation intact    ASSESSMENT/PLAN:   Pt. is a 80 y.o. female s/p laparoscopic subtotal cholecystectomy (8/6). POD9. ERCP with stent placement (8/12).      - UA performed yesterday was negative  - ZAYRA with decreasing output, 15cc over past 24 hours. Will plan for outpatient ERCP 6-8 weeks. - Continue ABx, to stop today  - Continue Low fat diet and nutritional supplements  - PICC line dislodged. TPN stopped and D5 started. Will access calorie counts to determine if pt will require PICC placement on monday  - Continue lovenox  - PT/OT, 15/19 respectively. SW following.  Pt will likely need to go with TPN for nutritional support    Iram Montoya DO  PGY1, General Surgery  08/15/21  6:32 AM  [phone]

## 2021-08-15 NOTE — PROGRESS NOTES
Point of Care Note:  General Surgery  Aliza Pablo    1:06 PM  8/15/2021      Called to bedside for page from RN stating that patient is complaining of intermittent chest pain. Went to bedside to assess the patient:    S: Patient states pain is intermittent, lasting about 10-15 minutes at a time, every night for several nights since she has been in the hospital. She states this complaint is not new. Patient states that pain is located at the epigastrium and moves upward to the lower part of her mid chest.  She denies radiation to the back, arms, neck or jaw. She denies any shortness of breath, nausea, lightheadedness. The patient denies a history of GERD and does not take anything for acid reflux at home. O:   Vitals:    08/15/21 1039   BP: 131/76   Pulse: 90   Resp: 18   Temp: 97.4 °F (36.3 °C)   SpO2: 97%     General: Alert and conversational, no acute distress  CV: Regular rate and rhythm, capillary refill is brisk distally. No reproduction of complaint with palpation  Pulmonary: No increased work of breathing on room air  Extremities: No cyanosis, no edema    A: I do not think that the signs and symptoms are consistent with acute coronary syndrome or with pulmonary embolism. Vital signs are normal, patient is on room air, complaint is not new and seems more consistent with gastroesophageal reflux. However, considering the patient's age and recent surgery, I have a low threshold to rule out coronary syndrome. P:   - Stat EKG, chest x-ray, troponin ordered. - Will follow closely and consult cardiology should any of the chest pain work-up be concerning.  -Also ordered Tums and IV Pepcid to see if that resolves the patient's symptoms.     Anahi Cabral DO  PGY1, General Surgery  08/15/21  1:19 PM  [phone]'

## 2021-08-16 VITALS
DIASTOLIC BLOOD PRESSURE: 77 MMHG | SYSTOLIC BLOOD PRESSURE: 124 MMHG | BODY MASS INDEX: 25.19 KG/M2 | WEIGHT: 160.5 LBS | RESPIRATION RATE: 16 BRPM | HEART RATE: 97 BPM | OXYGEN SATURATION: 97 % | HEIGHT: 67 IN | TEMPERATURE: 97.4 F

## 2021-08-16 LAB
ALBUMIN SERPL-MCNC: 2.5 G/DL (ref 3.4–5)
ANION GAP SERPL CALCULATED.3IONS-SCNC: 12 MMOL/L (ref 3–16)
BASOPHILS ABSOLUTE: 0 K/UL (ref 0–0.2)
BASOPHILS RELATIVE PERCENT: 0.3 %
BUN BLDV-MCNC: 15 MG/DL (ref 7–20)
C-REACTIVE PROTEIN: 47.3 MG/L (ref 0–5.1)
CALCIUM SERPL-MCNC: 8 MG/DL (ref 8.3–10.6)
CHLORIDE BLD-SCNC: 101 MMOL/L (ref 99–110)
CO2: 22 MMOL/L (ref 21–32)
CREAT SERPL-MCNC: 1 MG/DL (ref 0.6–1.2)
EKG ATRIAL RATE: 115 BPM
EKG ATRIAL RATE: 394 BPM
EKG DIAGNOSIS: NORMAL
EKG DIAGNOSIS: NORMAL
EKG Q-T INTERVAL: 348 MS
EKG Q-T INTERVAL: 364 MS
EKG QRS DURATION: 94 MS
EKG QRS DURATION: 96 MS
EKG QTC CALCULATION (BAZETT): 464 MS
EKG QTC CALCULATION (BAZETT): 470 MS
EKG R AXIS: 34 DEGREES
EKG R AXIS: 38 DEGREES
EKG T AXIS: 60 DEGREES
EKG T AXIS: 67 DEGREES
EKG VENTRICULAR RATE: 110 BPM
EKG VENTRICULAR RATE: 98 BPM
EOSINOPHILS ABSOLUTE: 0.2 K/UL (ref 0–0.6)
EOSINOPHILS RELATIVE PERCENT: 1.7 %
GFR AFRICAN AMERICAN: >60
GFR NON-AFRICAN AMERICAN: 52
GLUCOSE BLD-MCNC: 122 MG/DL (ref 70–99)
GLUCOSE BLD-MCNC: 155 MG/DL (ref 70–99)
GLUCOSE BLD-MCNC: 159 MG/DL (ref 70–99)
HCT VFR BLD CALC: 29.8 % (ref 36–48)
HEMOGLOBIN: 10.4 G/DL (ref 12–16)
LYMPHOCYTES ABSOLUTE: 0.7 K/UL (ref 1–5.1)
LYMPHOCYTES RELATIVE PERCENT: 5.9 %
MAGNESIUM: 2.3 MG/DL (ref 1.8–2.4)
MCH RBC QN AUTO: 32.1 PG (ref 26–34)
MCHC RBC AUTO-ENTMCNC: 34.8 G/DL (ref 31–36)
MCV RBC AUTO: 92.2 FL (ref 80–100)
MONOCYTES ABSOLUTE: 2.3 K/UL (ref 0–1.3)
MONOCYTES RELATIVE PERCENT: 20.5 %
NEUTROPHILS ABSOLUTE: 7.9 K/UL (ref 1.7–7.7)
NEUTROPHILS RELATIVE PERCENT: 71.6 %
PDW BLD-RTO: 15 % (ref 12.4–15.4)
PERFORMED ON: ABNORMAL
PERFORMED ON: ABNORMAL
PHOSPHORUS: 2.7 MG/DL (ref 2.5–4.9)
PLATELET # BLD: 373 K/UL (ref 135–450)
PMV BLD AUTO: 8.6 FL (ref 5–10.5)
POTASSIUM SERPL-SCNC: 4 MMOL/L (ref 3.5–5.1)
PREALBUMIN: 15.5 MG/DL (ref 20–40)
RBC # BLD: 3.23 M/UL (ref 4–5.2)
SODIUM BLD-SCNC: 135 MMOL/L (ref 136–145)
TRIGL SERPL-MCNC: 117 MG/DL (ref 0–150)
WBC # BLD: 11.1 K/UL (ref 4–11)

## 2021-08-16 PROCEDURE — 83735 ASSAY OF MAGNESIUM: CPT

## 2021-08-16 PROCEDURE — 93005 ELECTROCARDIOGRAM TRACING: CPT | Performed by: STUDENT IN AN ORGANIZED HEALTH CARE EDUCATION/TRAINING PROGRAM

## 2021-08-16 PROCEDURE — 80069 RENAL FUNCTION PANEL: CPT

## 2021-08-16 PROCEDURE — 6370000000 HC RX 637 (ALT 250 FOR IP): Performed by: SURGERY

## 2021-08-16 PROCEDURE — 86140 C-REACTIVE PROTEIN: CPT

## 2021-08-16 PROCEDURE — 6360000002 HC RX W HCPCS: Performed by: SURGERY

## 2021-08-16 PROCEDURE — 97530 THERAPEUTIC ACTIVITIES: CPT

## 2021-08-16 PROCEDURE — 97535 SELF CARE MNGMENT TRAINING: CPT

## 2021-08-16 PROCEDURE — 36415 COLL VENOUS BLD VENIPUNCTURE: CPT

## 2021-08-16 PROCEDURE — 97116 GAIT TRAINING THERAPY: CPT

## 2021-08-16 PROCEDURE — 84134 ASSAY OF PREALBUMIN: CPT

## 2021-08-16 PROCEDURE — 84478 ASSAY OF TRIGLYCERIDES: CPT

## 2021-08-16 PROCEDURE — 6370000000 HC RX 637 (ALT 250 FOR IP): Performed by: STUDENT IN AN ORGANIZED HEALTH CARE EDUCATION/TRAINING PROGRAM

## 2021-08-16 PROCEDURE — 6370000000 HC RX 637 (ALT 250 FOR IP)

## 2021-08-16 PROCEDURE — 93010 ELECTROCARDIOGRAM REPORT: CPT | Performed by: INTERNAL MEDICINE

## 2021-08-16 PROCEDURE — 93005 ELECTROCARDIOGRAM TRACING: CPT

## 2021-08-16 PROCEDURE — 85025 COMPLETE CBC W/AUTO DIFF WBC: CPT

## 2021-08-16 PROCEDURE — 2580000003 HC RX 258: Performed by: STUDENT IN AN ORGANIZED HEALTH CARE EDUCATION/TRAINING PROGRAM

## 2021-08-16 RX ORDER — CLOPIDOGREL BISULFATE 75 MG/1
75 TABLET ORAL DAILY
Qty: 30 TABLET | Refills: 3 | OUTPATIENT
Start: 2021-08-16 | End: 2021-09-28

## 2021-08-16 RX ADMIN — AMLODIPINE BESYLATE 10 MG: 10 TABLET ORAL at 08:46

## 2021-08-16 RX ADMIN — METOPROLOL TARTRATE 25 MG: 25 TABLET, FILM COATED ORAL at 08:46

## 2021-08-16 RX ADMIN — Medication 10 ML: at 08:46

## 2021-08-16 RX ADMIN — FAMOTIDINE 20 MG: 20 TABLET, FILM COATED ORAL at 08:46

## 2021-08-16 RX ADMIN — ENOXAPARIN SODIUM 40 MG: 40 INJECTION SUBCUTANEOUS at 08:46

## 2021-08-16 RX ADMIN — DIBASIC SODIUM PHOSPHATE, MONOBASIC POTASSIUM PHOSPHATE AND MONOBASIC SODIUM PHOSPHATE 2 TABLET: 852; 155; 130 TABLET ORAL at 08:46

## 2021-08-16 RX ADMIN — DOCUSATE SODIUM 100 MG: 100 CAPSULE ORAL at 08:45

## 2021-08-16 RX ADMIN — INSULIN HUMAN 2 UNITS: 100 INJECTION, SOLUTION PARENTERAL at 12:12

## 2021-08-16 ASSESSMENT — PAIN SCALES - GENERAL: PAINLEVEL_OUTOF10: 0

## 2021-08-16 NOTE — PROGRESS NOTES
Alert, oriented. Patient states she ate most of her dinner tonight and it tasted good. In a better mood tonight. Up to urinate several times but states she is getting better sleep and not having pain in her stomach. Moving around better than previous nights that I have cared for patient. Scant drainage in ZAYRA drain.

## 2021-08-16 NOTE — PROGRESS NOTES
Colorectal Surgery   Daily Progress Note  Germán Cordoba  CC:  Symptomatic cholelithiasis    SUBJECTIVE:  Patient complained of intermittent chest pain overnight. Troponin negative. Patient with some heartburn pain yesterday that resolved with tums and Pepcid. Mild tachycardia overnight (HR ) but otherwise HDS. States pain is well controlled. She is hungry this AM.     ROS:   A 14 point review of systems was conducted, significant findings as noted in HPI. All other systems negative. OBJECTIVE:   Infusions:   dextrose 75 mL/hr at 08/15/21 2200    dextrose      sodium chloride      sodium chloride          I/O:I/O last 3 completed shifts: In: 5 [P.O.:720]  Out: 505 [Urine:500; Drains:5]           Wt Readings from Last 1 Encounters:   08/16/21 160 lb 7.9 oz (72.8 kg)     LABS:    Recent Labs     08/14/21  0643 08/15/21  0523   WBC 13.0* 11.9*   HGB 10.6* 10.5*   HCT 30.5* 30.3*   MCV 91.6 91.5    373     Recent Labs     08/14/21  0643 08/15/21  0523    132*   K 4.2 4.1    101   CO2 24 22   PHOS 2.7 2.9   BUN 24* 19   CREATININE 0.8 0.9     Recent Labs     08/15/21  0523   AST 20   ALT 15   BILIDIR <0.2   BILITOT 0.5   ALKPHOS 129      No results for input(s): LIPASE, AMYLASE in the last 72 hours.      Recent Labs     08/15/21  0523 08/15/21  1150   PROT 5.9*  --    INR  --  1.12        Recent Labs     08/15/21  1342   TROPONINI <0.01            Exam  BP (!) 142/86   Pulse 111   Temp 97.6 °F (36.4 °C) (Oral)   Resp 18   Ht 5' 7\" (1.702 m)   Wt 160 lb 7.9 oz (72.8 kg)   SpO2 97%   BMI 25.14 kg/m²      General Appearance: alert, appears stated age and cooperative  Cardiovascular: RRR, brisk capillary refill  Abdomen: Soft, appropriately-tender, incisions c/d/i and well approximated with Dermabond, ZAYRA drain in place with minimal bilious output  Skin: warm and dry, no rashes  Extremities: no edema, no cyanosis  Neuro: A&Ox3, no focal deficits, sensation intact    ASSESSMENT/PLAN: Pt. is a 80 y.o. female s/p laparoscopic subtotal cholecystectomy (8/6). POD10 ERCP with stent placement (8/12). - ZAYRA with decreasing output, 5cc over past 24 hours. Will discuss drain removal today. - Will plan for outpatient ERCP 6-8 weeks. - Continue Low fat diet and nutritional supplements  - PICC line dislodged (8/14). TPN stopped. - Continue lovenox  - PT/OT, 15/19 respectively. SW following. Patient stable for d/c, appetite improving, will not place PICC as there is no need for TPN.     Arpita Nunn DO, MS  PGY1, General Surgery  08/16/21  6:14 AM  [phone]

## 2021-08-16 NOTE — PROGRESS NOTES
Physical Therapy  Facility/Department: [facility]  Daily Treatment Note  NAME: Michael Michelle  : 1932  MRN: [id number]    Date of Service: 2021    Discharge Recommendations:Imelda Wick scored a 18/24 on the AM-PAC short mobility form. Current research shows that an AM-PAC score of 18 or greater is typically associated with a discharge to the patient's home setting. Based on the patient's AM-PAC score and their current functional mobility deficits, it is recommended that the patient have 2-3 sessions per week of Physical Therapy at d/c to increase the patient's independence. At this time, this patient demonstrates the endurance and safety to discharge home with (home  services) and a follow up treatment frequency of 2-3x/wk. Please see assessment section for further patient specific details. If patient discharges prior to next session this note will serve as a discharge summary. Please see below for the latest assessment towards goals. PT Equipment Recommendations  Equipment Needed: No    Assessment   Assessment: Pt demo marked improvement with mobility this date and plans to return home with 24 hour supervision of family. If home, recommend 24 hour supervision initially, home PT, use of RW (has). Treatment Diagnosis: decreased functional mobility  Patient Education: Pt educated on PT role, importance of OOB mobility, need to call for assist to get up and she verbalized understanding. REQUIRES PT FOLLOW UP: Yes  Activity Tolerance  Activity Tolerance: Patient Tolerated treatment well     Patient Diagnosis(es): The encounter diagnosis was Acute cholecystitis. has a past medical history of CVA (cerebral vascular accident) ([address] and Hypertension.    has a past surgical history that includes Cholecystectomy, laparoscopic (N/A, 2021); ERCP (2021); ERCP (2021); ERCP (2021); and ERCP (8/12/2021). Restrictions  Restrictions/Precautions  Restrictions/Precautions: Up as Tolerated  Position Activity Restriction  Other position/activity restrictions: up as tolerated     Subjective   General  Chart Reviewed: Yes  Additional Pertinent Hx: Admit 8/5, Abd/pelvic CT: Probable acute cholecystitis with cholelithiasis; to OR 8/6 for Subtotal cholecystectomy, laparoscopic; Post-operative course complicated with bile leak evidenced by bilious output from ZAYRA; PMHx: CVA, HTN  Family / Caregiver Present: Yes (son)  Subjective  Subjective: Pt found reclined in chair and agreeable to PT. \" I'm going home today. \"  Pain Screening  Patient Currently in Pain: Denies         Orientation  Orientation  Overall Orientation Status: Within Functional Limits       Objective      Transfers  Sit to Stand: Stand by assistance (x2 trials)  Stand to sit: Stand by assistance (x2 trials)     Ambulation 1  Device: Rolling Walker  Assistance: Stand by assistance  Quality of Gait: Demos significant forward flexed posture with inability to correct. Fairly steady gait overall. Gait Deviations: Increased ROSALINDA; Decreased step length;Decreased step height;Slow Bryanna  Distance: 15 ft, 100 ft  Stairs/Curb  Stairs?:  (declined trial on steps-states sons will get her in house)                           AM-PAC Score  AM-PAC Inpatient Mobility Raw Score : 18 (08/16/21 1500)  AM-PAC Inpatient T-Scale Score : 43.63 (08/16/21 1500)  Mobility Inpatient CMS 0-100% Score: 46.58 (08/16/21 1500)  Mobility Inpatient CMS G-Code Modifier : CK (08/16/21 1500)          Goals  Short term goals  Time Frame for Short term goals: d/c  Short term goal 1: Pt will demos sit<>stand with supervision and proper placement of UEs using RW  ongoing  Short term goal 2: Pt will demos amb of 50 ft with supervision and use of RW   ongoing  Short term goal 3: Pt will demos ascent/descent of 5 steps with rails and SBA   ongoing  Patient Goals   Patient goals : return home    Plan    Plan  Times per week: 2-5  Current Treatment Recommendations: Strengthening, Endurance Training, Transfer Training, Patient/Caregiver Education & Training, Balance Training, Gait Training, Functional Mobility Training, Stair training  Safety Devices  Type of devices: Left in chair, Call light within reach, Chair alarm in place, Gait belt, Nurse notified (pt reclined)     Therapy Time   Individual Concurrent Group Co-treatment   Time In 1331         Time Out 1355         Minutes 24           Timed Code Treatment Minutes:24       Total Treatment Minutes:  24       Bárbara Given, PT

## 2021-08-16 NOTE — PROGRESS NOTES
Patient alert and oriented x4. VSS. Patient surgical sites CDI. Active bowel sounds present. Patient tolerating diet with no issues, stating an increase in appetite. ZAYRA with little output this shift. Patient voiding frequently. IVF infusing per orders. Patient denies any other needs at this time. Denies any pain at this time. Bed is in the lowest position, call light and bedside table within reach. Patient bed alarm is on. Will continue to monitor for changes in patient status.      Electronically signed by Marielena Echevarria RN on 8/16/2021 at 11:51 AM

## 2021-08-16 NOTE — PROGRESS NOTES
ZAYRA Removal:  ZAYRA drain with < 5 cc serous drainage in the bulb. Dressing was removed. Suture was identified and cut, all suture fragments were removed. The ZAYRA drain bulb was relieved of suction and the drain was pulled without difficulty. Pressure was held for 2-3 minutes. A new dressing with 2x2 gauzea and tegaderm was placed on the ZAYRA site. There was no bleeding or further serous drainage from the ZAYRA site. Patient tolerated removal well with minimal pain.      King Nelson CNP  8/16/2021  2:21 PM  mitul

## 2021-08-16 NOTE — CARE COORDINATION
Cm spoke with surgical NP, Ton Lee. Pt will not dc on TPN now. Pt will not have ZAYRA drain either. CM received call from [facility]. They can accept pt. CM called Nestor Javier at [facility]. Left message asking if they could accept pt now that no TPN is needed. CM called [facility], left message with admissions asking if they could accept pt now. 1:16 PM  CM met with pt and sons. Pt now wants to go home with home care. Pt lives with sons. They are agreeable to assist pt at home. They are agreeable to home care. Referral previously sent to [facility]. They are agreeable to use this home care. Pt will need nursing, PT, OT. Possible dc later today or tomorrow. Cm will continue to follow.

## 2021-08-16 NOTE — PROGRESS NOTES
Patient discharge education complete to patient and patient's family. IV removed and dressing placed. Patient verbalized understanding of medications and side effects at time of discharge. Patient understanding of all surgical restrictions. All questions answered at this time.      Electronically signed by Sanjiv Zabala RN on 8/16/2021 at 3:25 PM

## 2021-08-16 NOTE — PROGRESS NOTES
Occupational Therapy  Facility/Department: [facility]'[address]  Daily Treatment Note  NAME: Mary Mobley  : 1932  MRN: [id number]    Date of Service: 2021    Discharge Recommendations:    Mary Mobley scored a 21/24 on the AM-PAC ADL Inpatient form. Current research shows that an AM-PAC score of 18 or greater is typically associated with a discharge to the patient's home setting. Based on the patient's AM-PAC score, and their current ADL deficits, it is recommended that the patient have 2-3 sessions per week of Occupational Therapy at d/c to increase the patient's independence. At this time, this patient demonstrates the endurance and safety to discharge  home with home services and a follow up treatment frequency of 2-3x/wk. Please see assessment section for further patient specific details. If patient discharges prior to next session this note will serve as a discharge summary. Please see below for the latest assessment towards goals. OT Equipment Recommendations  Equipment Needed: Yes  Mobility Devices: ADL Assistive Devices  ADL Assistive Devices: Shower Chair with back    Assessment   Performance deficits / Impairments: Decreased functional mobility ; Decreased ADL status; Decreased ROM; Decreased strength;Decreased endurance;Decreased safe awareness;Decreased posture;Decreased coordination;Decreased fine motor control  Assessment: Pt is pleasant and cooperative. Pt has met all goals. Pt appears to have good family support, and hopes to go home today.   Treatment Diagnosis: Decreased ROM and endurance resulting in decreased ADL independence  Prognosis: Good  OT Education: OT Role;Plan of Care;ADL Adaptive Strategies;Transfer Training  Patient Education: Pt and son verbalized understanding  REQUIRES OT FOLLOW UP: Yes  Activity Tolerance  Activity Tolerance: Patient Tolerated treatment well  Safety Devices  Safety Devices in place: Yes  Type of devices: Left in chair;Call light within reach;Chair alarm in place;Nurse notified (son present)       Restrictions  Restrictions/Precautions  Restrictions/Precautions: Up as Tolerated  Position Activity Restriction  Other position/activity restrictions: up as tolerated  Subjective   General  Chart Reviewed: Yes  Patient assessed for rehabilitation services?: Yes  Additional Pertinent Hx: Pt is 81 yo female presenting from home to [facility] with c/o abdominal pain. Pt was seen 2 days prior to admission with cholecystitis, but decided to follow up in OP for maintenance. Colorectal surgery= laparoscopic cholecystectomy- gallbladder stone and inflammation, stone fragments removed and gallbaldder aspirated. Cardiology consult= pt stable. Gastroenterology consult= monitor ZAYRA drain output, possible need for ERCP with plastic biliary stent. Abdominal/Pelvic CT= probable acute cholecystitis with cholelithiasis; Chest XR= mild pulmonary vascular cocngestion and R basilar atectasis; Chest XR= R arm PICC terminated within cavoatrial junction, appropriate position, mild basilar pleural and parenchymal disease. PMHx= CVA and HTN. No surgical Hx on fle. Family / Caregiver Present: Yes (son)  Referring Practitioner: Samantha Vuong. DO Ch[place]  Diagnosis: Acute cholecystitis  Subjective  Subjective: Pt up in chair upon entry. Pt hopes to go home today. Vital Signs  Patient Currently in Pain: Denies   Objective    ADL  Grooming: Stand by assistance (to wash hands and brush hair, standing at sink)  LE Dressing: Stand by assistance (for socks)  Toileting: Stand by assistance        Balance  Sitting Balance: Supervision  Standing Balance: Stand by assistance  Standing Balance  Time: ~5 min  Activity: bathroom mobility/activity, walk in brady  Functional Mobility  Functional - Mobility Device: Rolling Walker  Activity: To/from bathroom; Other  Assist Level: Stand by assistance  Toilet Transfers  Toilet - Technique: Ambulating  Equipment Used: Standard toilet (grab bar)  Toilet Transfer: Stand by assistance     Transfers  Stand Step Transfers: Stand by assistance  Sit to stand: Stand by assistance  Stand to sit: Stand by assistance                       Cognition  Overall Cognitive Status: Exceptions  Following Commands: Follows one step commands with increased time  Attention Span: Attends with cues to redirect  Safety Judgement: Decreased awareness of need for assistance  Insights: Decreased awareness of deficits                                         Plan   Plan  Times per week: 2-5  Times per day: Daily  Current Treatment Recommendations: ROM, Strengthening, Endurance Training, Functional Mobility Training, Balance Training, Safety Education & Training, Patient/Caregiver Education & Training, Equipment Evaluation, Education, & procurement, Self-Care / ADL  AM-PAC Score        AM-[facility] Inpatient Daily Activity Raw Score: 21 (08/16/21 1520)  AM-PAC Inpatient ADL T-Scale Score : 44.27 (08/16/21 1520)  ADL Inpatient CMS 0-100% Score: 32.79 (08/16/21 1520)  ADL Inpatient CMS G-Code Modifier : Christy Flores (08/16/21 1520)    Goals  Short term goals  Time Frame for Short term goals: By discharge  Short term goal 1: Pt will CGA to complete toileting tasks     -MET 8/16/21-  Short term goal 2: Pt will require mod A to complete LB dressing       -MET 8/16/21-  Short term goal 3: Pt will require SBA for sit<>stand transfers           -MET 8/16/21-  Patient Goals   Patient goals :  To go home       Therapy Time   Individual Concurrent Group Co-treatment   Time In 1330         Time Out 1353         Minutes 23         Timed Code Treatment Minutes: 23 Minutes    Total Treatment 44 Simran Donnelly, OTR/L [id number]

## 2021-08-16 NOTE — DISCHARGE INSTR - COC
Continuity of Care Form    Patient Name: Jose M Neumann   :  1932  MRN:  [id number]    [facility] date:  2021  Discharge date:  2021    Code Status Order: Full Code   Advance Directives:      Admitting Physician:  Catalino Rodríguez MD  PCP: Minda Porter MD    Discharging Nurse: [facility] Unit/Room#: 6751/1415-54  Discharging Unit Phone Number: [phone]    Emergency Contact:   Extended Emergency Contact Information  Primary Emergency Contact: Eusebio Marshall  Home Phone: [phone]  Relation: Child  Secondary Emergency Contact: Heidi Darby  Home Phone: [phone]  Relation: Child    Past Surgical History:  Past Surgical History:   Procedure Laterality Date    CHOLECYSTECTOMY, LAPAROSCOPIC N/A 2021    LAPAROSCOPIC SUBTOTAL CHOLECYSTECTOMY performed by Catalino Rodríguez MD at [address] [place] [address] ERCP  2021    ERCP SPHINCTER/PAPILLOTOMY performed by Som Mclaughlin MD at [facility] ERCP  2021    ERCP ENDOSCOPIC RETROGRADE CHOLANGIOPANCREATOGRAPHY performed by Som Mclaughlin MD at [facility] ERCP  2021    ERCP DILATION BALLOON performed by Som Mclaughlin MD at [facility] ERCP  2021    ERCP STENT INSERTION performed by Som Mclaughlin MD at [place] [address]       Immunization History:   Immunization History   Administered Date(s) Administered    COVID-19, [email or web address] PF, 30mcg/0.3mL 2021, 2021       Active Problems:  Patient Active Problem List   Diagnosis Code    Hypertensive urgency I16.0    Cerebrovascular accident (CVA) of right thalamus ([facility] [address] I63.9    Abnormal gait R26.9    Carotid stenosis I65.29    Acute cholecystitis K81.0    Pre-op evaluation Z01.818       Isolation/Infection:   Isolation            No Isolation          Patient Infection Status       None to display            Nurse Assessment:  Last Vital Signs: BP (!) 142/86   Pulse 111   Temp 97.6 °F (36.4 °C) (Oral)   Resp 18   Ht 5' 7\" (1.702 m)   Wt 160 lb 7.9 oz (72.8 kg) SpO2 97%   BMI 25.14 kg/m²     Last documented pain score (0-10 scale): Pain Level: 0  Last Weight:   Wt Readings from Last 1 Encounters:   08/16/21 160 lb 7.9 oz (72.8 kg)     Mental Status:  oriented and alert    IV Access:  - None    Nursing Mobility/ADLs:  Walking   Assisted  Transfer  Assisted  Bathing  Assisted  Dressing  Assisted  Toileting  Independent  Feeding  [facility] Delivery   whole    Wound Care Documentation and Therapy:        Elimination:  Continence:   · Bowel: Yes  · Bladder: Yes  Urinary Catheter: None   Colostomy/Ileostomy/Ileal Conduit: No       Date of Last BM: 8/13/2021    Intake/Output Summary (Last 24 hours) at 8/16/2021 0614  Last data filed at 8/16/2021 0559  Gross per 24 hour   Intake 1620 ml   Output 505 ml   Net 1115 ml     I/O last 3 completed shifts: In: 5 [P.O.:720]  Out: 26 [Urine:500; Drains:5]    Safety Concerns: At Risk for Falls    Impairments/Disabilities:      None    Nutrition Therapy:  Current Nutrition Therapy:   - Oral Diet:  Low Fat    Routes of Feeding: Oral  Liquids: No Restrictions  Daily Fluid Restriction: no  Last Modified Barium Swallow with Video (Video Swallowing Test): not done    Treatments at the Time of Hospital Discharge:   Respiratory Treatments: n/a  Oxygen Therapy:  is not on home oxygen therapy.   Ventilator:    - No ventilator support    Rehab Therapies: Physical Therapy and Occupational Therapy  Weight Bearing Status/Restrictions: No weight bearing restirctions  Other Medical Equipment (for information only, NOT a DME order):  walker  Other Treatments: n/a    Patient's personal belongings (please select all that are sent with patient):  Glasses    RN SIGNATURE:  Electronically signed by Manoj Lopez RN on 8/16/21 at 1:56 PM EDT    CASE MANAGEMENT/SOCIAL WORK SECTION    Inpatient Status Date: 8/16/21    Readmission Risk Assessment Score:  Readmission Risk              Risk of Unplanned Readmission:  14 Discharging to Facility/ [address] [place] [address] (if applicable)   · Name:  · Address:  · Dialysis Schedule:  · Phone:  · Fax:    / signature: Electronically signed by Arnaldo Pace RN on 8/16/21 at 3:14 PM EDT    PHYSICIAN SECTION    Prognosis: Good    Condition at Discharge: Stable    Rehab Potential (if transferring to Rehab): Good    Recommended Labs or Other Treatments After Discharge:   Drain care- please keep a daily output of the drain, please bring to your follow up appointment. Please follow up with Dr. Beatriz Contreras please call [phone] to make your appointment in 2 weeks. Physician Certification: I certify the above information and transfer of Mamadou Joseph  is necessary for the continuing treatment of the diagnosis listed and that she requires [facility] for greater 30 days.      Update Admission H&P: No change in H&P    PHYSICIAN SIGNATURE:  Electronically signed by Paris Lanza MD on 8/16/21 at 6:15 AM EDT

## 2021-08-16 NOTE — CARE COORDINATION
Case Management Assessment            Discharge Note                    Date / Time of Note: 8/16/2021 3:00 PM                  Discharge Note Completed by: Bijal Cyr RN    Patient Name: Mary Mobley   YOB: 1932  Diagnosis: Acute cholecystitis [K81.0]  Acute calculous cholecystitis [K80.00]   Date / Time: 8/5/2021  2:19 AM    Current PCP: Ozzy Odom MD  Clinic patient: No    Hospitalization in the last 30 days: No    Advance Directives:  Code Status: Full Code  [place] DNR form completed and on chart: Not Indicated    Financial:  Payor: MEDICARE / Plan: MEDICARE PART A AND B / Product Type: *No Product type* /      Pharmacy:    [facility] [address] Yvette Virk [phone] - F [phone]  Mervin Funez 984  [address] [place], [place] [address]  Phone: [phone] Fax: [phone]      Assistance purchasing medications?:    Assistance provided by Case Management: None at this time    Does patient want to participate in local refill/ meds to beds program?:      Meds To Beds General Rules:  1. Can ONLY be done Monday- Friday between 8:30am-5pm  2. Prescription(s) must be in pharmacy by 3pm to be filled same day  3. Copy of patient's insurance/ prescription drug card and patient face sheet must be sent along with the prescription(s)  4. Cost of Rx cannot be added to hospital bill. If financial assistance is needed, please contact unit  or ;  or  CANNOT provide pharmacy voucher for patients co-pays  5.  Patients can then  the prescription on their way out of the hospital at discharge, or pharmacy can deliver to the bedside if staff is available. (payment due at time of pick-up or delivery - cash, check, or card accepted)     Able to afford home medications/ co-pay costs: Yes    ADLS:  Current PT AM-PAC Score: 15 /24  Current OT AM-PAC Score: 19 /24      DISCHARGE Disposition: Home [address]: [facility] [address]     LOC at discharge: Skilled  FRANK Completed: Yes    Notification completed in HENS/PAS?:  Not Applicable    IMM Completed:   Yes, Case management has presented and reviewed IMM letter #2 to the patient and/or family/ POA. Patient and/or family/POA verbalized understanding of their medicare rights and appeal process if needed. Patient and/or family/POA has signed, initialed and placed today's date (8/16/21) and time (245) on IMM letter #2 on the the appropriate lines. Patient and/or family/POA, copy of letter offered and they are aware that this original copy of IMM letter #2 is available prior to discharge from the paper chart on the unit. Electronic documentation has been entered into epic for IMM letter #2 and original paper copy has been added to the paper chart at the nurses station. Transportation:  Transportation PLAN for discharge: family   Mode of Transport: [employer] ordered at discharge: Yes  [address]: [facility]  Phone: [phone]  Fax: [phone]  Orders faxed: Yes; pulled from [address]:  DME Provider:    Equipment obtained during hospitalization:  none    Home Oxygen and Respiratory Equipment:  Oxygen needed at discharge?: Not [address]: Not Applicable  Portable tank available for discharge?: Not Indicated    Dialysis:  Dialysis patient: No    Dialysis Center:  Not Applicable    Hospice Services:  Location: Not Applicable  Agency: Not Applicable    Consents signed: Not Indicated    Referrals made at [facility] for outpatient continued care:  Not Applicable    Additional CM Notes:  CM met with family. They prefer to take pt home. They are agreeable to home care with [facility]. They can stay with pt 24hrs/day.      The Plan for Transition of Care is related to the following treatment goals of Acute cholecystitis [K81.0]  Acute calculous cholecystitis [K80.00]    The Patient and/or patient representative Leon Montana and her family were provided with a choice of provider and agrees with the discharge plan Yes    Freedom of choice list was provided with basic dialogue that supports the patient's individualized plan of care/goals and shares the quality data associated with the providers.  Yes    Care Transitions patient: No    Sandra Arce RN  [facility]  Case Management Department  Ph: [phone]

## 2021-08-16 NOTE — CONSULTS
Clinical Pharmacy Progress Note    Admit date: 8/5/2021     Pt is a 80yof with a PMHx that includes HTN & CVA who is admitted with acute cholecystitis, now s/p laparoscopic subtotal cholecystectomy (done 8/6). Post-op course has been complicated by bile leak with bilious output from ZAYRA. S/p ERCP with stent placement (done 8/12). Interval update:  PICC dislodged / removed 8/14 early AM.  Pt did not receive any TPN over the weekend. Per surgery plan today - PICC will not be replaced - appetite is improving and pt no longer needs TPN. As TPN has been discontinued. Will sign off pharmacy to dose TPN consult. If medication is restarted and pharmacy is to manage dosing, please re-consult at that time.     Please call with questions--  Thanks--  Den Gonzalez, PharmD, [address]  [id number] ([facility])   8/16/2021 8:48 AM

## 2021-08-23 NOTE — DISCHARGE SUMMARY
Physician Discharge Summary     Patient ID:  Jose M Neumann  [id number]  80 y.o.  8/21/1932    Admit date: 8/5/2021    Discharge date and time: 8/16/2021  3:54 PM     Admitting Physician: Catalino Rodríguez MD     Discharge Physician: same    Admission Diagnoses: Acute cholecystitis [K81.0]  Acute calculous cholecystitis [K80.00]    Discharge Diagnoses: acute on chronic cholecystitis, empyema of gallbladder    Admission Condition: stable    Discharged Condition: stable    Indication for Admission: post op from laparoscopic cholecystectomy    Hospital Course: pt was admitted the day before surgery after coming through the ED with abd pain which waxed and waned for 5 days. She was found to have a large gallstone and it she was presented with options on plan of care. As the pt continued to have pain that came and went and some nausea, she decided she would opt for surgical intervention with the above procedure. Cardiology was consulted for risk stratification and deemed appropirate for procedure. After surgery the pt was doing well and given a clear liquid diet. Antibiotic were continued as well as a ZAYRA drain as the surgery resulted in a subtotal cholecystectomy. The pt was noted to have a bile leak POD 2 and GI was consulted for management. The ZAYRA drain output was decreasing so continued monitoring and abx was recommended. The following few days the pt was monitored as she stated she was not hungry and was not eating much. It was decided to start the pt on TPN through a picc line for nutrition supplementation. Abx were continued. The following day the ZAYRA drain output increased dramatically and GI decided there was need for an ERCP with stenting. Post stent placement the pt had decreasing ZAYRA output. Pt started to tolerate a diet and the picc line was removed. Finally the ZAYRA drain was removed just prior to discharge. The pt was discharged home in stable condition.  PT/OT was onboard and suggested placement for the pt, however, the family was insisted there would be somebody at the residence to help the pt daily. The pt was aware of the need to follow up with both Dr. Dang Canada in 2 weeks and GI for an ERCP in 6 weeks. Consults: cardiology, GI    Treatments: surgery, ERCP    Discharge Exam:  General Appearance: alert, appears stated age and cooperative  Cardiovascular: RRR, brisk capillary refill  Abdomen: Soft, appropriately-tender, incisions c/d/i and well approximated with Dermabond, ZAYRA drain in place with minimal bilious output  Skin: warm and dry, no rashes  Extremities: no edema, no cyanosis  Neuro: A&Ox3, no focal deficits, sensation intact    Disposition: home    In process/preliminary results:  Outstanding Order Results     No orders found from 7/7/2021 to 8/6/2021.           Patient Instructions:   Discharge Medication List as of 8/16/2021  1:58 PM      CONTINUE these medications which have CHANGED    Details   clopidogrel (PLAVIX) 75 MG tablet Take 1 tablet by mouth daily, Disp-30 tablet, R-3Normal         CONTINUE these medications which have NOT CHANGED    Details   lisinopril (PRINIVIL;ZESTRIL) 10 MG tablet Take 1 tablet by mouth daily, Disp-30 tablet, R-3Normal      aspirin 81 MG EC tablet Take 1 tablet by mouth daily, Disp-30 tablet, R-3Normal      atorvastatin (LIPITOR) 40 MG tablet Take 1 tablet by mouth nightly, Disp-30 tablet, R-3Normal      metoprolol tartrate (LOPRESSOR) 25 MG tablet Take 1 tablet by mouth 2 times daily, Disp-60 tablet, R-5Normal      amLODIPine (NORVASC) 10 MG tablet Take 1 tablet by mouth daily, Disp-30 tablet, R-3Normal         STOP taking these medications       oxyCODONE (ROXICODONE) 5 MG immediate release tablet Comments:   Reason for Stopping:         docusate sodium (COLACE) 100 MG capsule Comments:   Reason for Stopping:             Ame Mohan CNP  8/23/2021  11:44 AM  mitul

## 2021-08-30 ENCOUNTER — OFFICE VISIT (OUTPATIENT)
Dept: SURGERY | Age: 86
End: 2021-08-30

## 2021-08-30 VITALS
TEMPERATURE: 97.8 F | SYSTOLIC BLOOD PRESSURE: 112 MMHG | HEART RATE: 104 BPM | BODY MASS INDEX: 25.14 KG/M2 | HEIGHT: 67 IN | DIASTOLIC BLOOD PRESSURE: 67 MMHG | OXYGEN SATURATION: 97 %

## 2021-08-30 DIAGNOSIS — K81.0 CHOLECYSTITIS, ACUTE: Primary | ICD-10-CM

## 2021-08-30 PROCEDURE — 99024 POSTOP FOLLOW-UP VISIT: CPT | Performed by: SURGERY

## 2021-08-30 NOTE — PROGRESS NOTES
Subjective:       Deward Phlegm presents to the clinic 2 weeks after laparoscopic partial cholecystectomy with subsequent ERCP and stent    HPI: Doing well. Feels some bloating. No jaundice. Tolerating a diet     Objective:      /67   Pulse 104   Temp 97.8 °F (36.6 °C) (Oral)   Ht 5' 7\" (1.702 m)   SpO2 97%   BMI 25.14 kg/m²     General:  alert, appears stated age and cooperative   Abdomen: soft, bowel sounds active, non-tender   Incision:   healing well, no drainage, no erythema, no hernia       Assessment:      Doing well postoperatively. Plan:      1. Continue any current medications. 2. Wound care discussed.   3. Will see [address] for stent removal down the road    Madelaine Perkins M.D.  8/30/21   10:35 AM

## 2021-09-06 PROBLEM — Z01.818 PRE-OP EVALUATION: Status: RESOLVED | Noted: 2021-08-07 | Resolved: 2021-09-06

## 2021-09-27 ENCOUNTER — HOSPITAL ENCOUNTER (EMERGENCY)
Age: 86
Discharge: HOME | End: 2021-09-28
Attending: EMERGENCY MEDICINE
Payer: MEDICARE

## 2021-09-27 ENCOUNTER — TELEPHONE (OUTPATIENT)
Dept: CARDIOLOGY CLINIC | Age: 86
End: 2021-09-27

## 2021-09-27 VITALS
WEIGHT: 147 LBS | SYSTOLIC BLOOD PRESSURE: 104 MMHG | HEART RATE: 103 BPM | DIASTOLIC BLOOD PRESSURE: 64 MMHG | RESPIRATION RATE: 18 BRPM | TEMPERATURE: 98.2 F | OXYGEN SATURATION: 96 % | HEIGHT: 65 IN | BODY MASS INDEX: 24.49 KG/M2

## 2021-09-27 DIAGNOSIS — I48.91 ATRIAL FIBRILLATION, UNSPECIFIED TYPE (HCC): Primary | ICD-10-CM

## 2021-09-27 LAB
A/G RATIO: 1 (ref 1.1–2.2)
ALBUMIN SERPL-MCNC: 3.9 G/DL (ref 3.4–5)
ALP BLD-CCNC: 140 U/L (ref 40–129)
ALT SERPL-CCNC: 18 U/L (ref 10–40)
ANION GAP SERPL CALCULATED.3IONS-SCNC: 14 MMOL/L (ref 3–16)
AST SERPL-CCNC: 27 U/L (ref 15–37)
BASOPHILS ABSOLUTE: 0 K/UL (ref 0–0.2)
BASOPHILS RELATIVE PERCENT: 0.7 %
BILIRUB SERPL-MCNC: 0.4 MG/DL (ref 0–1)
BUN BLDV-MCNC: 35 MG/DL (ref 7–20)
CALCIUM SERPL-MCNC: 9.1 MG/DL (ref 8.3–10.6)
CHLORIDE BLD-SCNC: 103 MMOL/L (ref 99–110)
CO2: 20 MMOL/L (ref 21–32)
CREAT SERPL-MCNC: 1.3 MG/DL (ref 0.6–1.2)
EOSINOPHILS ABSOLUTE: 0.2 K/UL (ref 0–0.6)
EOSINOPHILS RELATIVE PERCENT: 3.2 %
GFR AFRICAN AMERICAN: 47
GFR NON-AFRICAN AMERICAN: 39
GLOBULIN: 4 G/DL
GLUCOSE BLD-MCNC: 126 MG/DL (ref 70–99)
HCT VFR BLD CALC: 39.6 % (ref 36–48)
HEMOGLOBIN: 13.2 G/DL (ref 12–16)
LYMPHOCYTES ABSOLUTE: 0.8 K/UL (ref 1–5.1)
LYMPHOCYTES RELATIVE PERCENT: 16.8 %
MCH RBC QN AUTO: 31.7 PG (ref 26–34)
MCHC RBC AUTO-ENTMCNC: 33.3 G/DL (ref 31–36)
MCV RBC AUTO: 95.1 FL (ref 80–100)
MONOCYTES ABSOLUTE: 0.8 K/UL (ref 0–1.3)
MONOCYTES RELATIVE PERCENT: 15.4 %
NEUTROPHILS ABSOLUTE: 3.2 K/UL (ref 1.7–7.7)
NEUTROPHILS RELATIVE PERCENT: 63.9 %
PDW BLD-RTO: 15.7 % (ref 12.4–15.4)
PLATELET # BLD: 371 K/UL (ref 135–450)
PMV BLD AUTO: 8.7 FL (ref 5–10.5)
POTASSIUM REFLEX MAGNESIUM: 5 MMOL/L (ref 3.5–5.1)
RBC # BLD: 4.16 M/UL (ref 4–5.2)
SODIUM BLD-SCNC: 137 MMOL/L (ref 136–145)
TOTAL PROTEIN: 7.9 G/DL (ref 6.4–8.2)
WBC # BLD: 5 K/UL (ref 4–11)

## 2021-09-27 PROCEDURE — 93005 ELECTROCARDIOGRAM TRACING: CPT | Performed by: EMERGENCY MEDICINE

## 2021-09-27 PROCEDURE — 85025 COMPLETE CBC W/AUTO DIFF WBC: CPT

## 2021-09-27 PROCEDURE — 99283 EMERGENCY DEPT VISIT LOW MDM: CPT

## 2021-09-27 PROCEDURE — 80053 COMPREHEN METABOLIC PANEL: CPT

## 2021-09-27 PROCEDURE — 6370000000 HC RX 637 (ALT 250 FOR IP): Performed by: EMERGENCY MEDICINE

## 2021-09-27 PROCEDURE — 81001 URINALYSIS AUTO W/SCOPE: CPT

## 2021-09-27 PROCEDURE — 2580000003 HC RX 258: Performed by: EMERGENCY MEDICINE

## 2021-09-27 RX ORDER — SODIUM CHLORIDE, SODIUM LACTATE, POTASSIUM CHLORIDE, CALCIUM CHLORIDE 600; 310; 30; 20 MG/100ML; MG/100ML; MG/100ML; MG/100ML
1000 INJECTION, SOLUTION INTRAVENOUS ONCE
Status: DISCONTINUED | OUTPATIENT
Start: 2021-09-27 | End: 2021-09-27

## 2021-09-27 RX ORDER — SODIUM CHLORIDE, SODIUM LACTATE, POTASSIUM CHLORIDE, AND CALCIUM CHLORIDE .6; .31; .03; .02 G/100ML; G/100ML; G/100ML; G/100ML
500 INJECTION, SOLUTION INTRAVENOUS ONCE
Status: DISCONTINUED | OUTPATIENT
Start: 2021-09-27 | End: 2021-09-27

## 2021-09-27 RX ORDER — SODIUM CHLORIDE, SODIUM LACTATE, POTASSIUM CHLORIDE, AND CALCIUM CHLORIDE .6; .31; .03; .02 G/100ML; G/100ML; G/100ML; G/100ML
500 INJECTION, SOLUTION INTRAVENOUS ONCE
Status: COMPLETED | OUTPATIENT
Start: 2021-09-27 | End: 2021-09-27

## 2021-09-27 RX ADMIN — METOPROLOL TARTRATE 25 MG: 25 TABLET, FILM COATED ORAL at 23:54

## 2021-09-27 RX ADMIN — SODIUM CHLORIDE, POTASSIUM CHLORIDE, SODIUM LACTATE AND CALCIUM CHLORIDE 500 ML: 600; 310; 30; 20 INJECTION, SOLUTION INTRAVENOUS at 22:23

## 2021-09-27 NOTE — TELEPHONE ENCOUNTER
Fernando Huddleston from Dr. John Hays office called. Jeannine Rousseau was in a-fib today at PCP office visit. She will need cardiac clearance soon for removal of a stent in her galbladder. Jeannine Rousseau doesn't drive so if appointment is made it should be set up with son, Janny Zamudio who brings her to appointment. His number is [phone]. Abi's number is [phone].

## 2021-09-28 LAB
BILIRUBIN URINE: NEGATIVE
BLOOD, URINE: NEGATIVE
CLARITY: CLEAR
COLOR: YELLOW
EKG ATRIAL RATE: 102 BPM
EKG DIAGNOSIS: NORMAL
EKG Q-T INTERVAL: 330 MS
EKG QRS DURATION: 90 MS
EKG QTC CALCULATION (BAZETT): 462 MS
EKG R AXIS: 31 DEGREES
EKG T AXIS: -31 DEGREES
EKG VENTRICULAR RATE: 118 BPM
EPITHELIAL CELLS, UA: ABNORMAL /HPF (ref 0–5)
GLUCOSE URINE: NEGATIVE MG/DL
KETONES, URINE: NEGATIVE MG/DL
LEUKOCYTE ESTERASE, URINE: ABNORMAL
MICROSCOPIC EXAMINATION: YES
NITRITE, URINE: NEGATIVE
PH UA: 6 (ref 5–8)
PROTEIN UA: NEGATIVE MG/DL
RBC UA: ABNORMAL /HPF (ref 0–4)
RENAL EPITHELIAL, UA: ABNORMAL /HPF (ref 0–1)
SPECIFIC GRAVITY UA: 1.01 (ref 1–1.03)
URINE REFLEX TO CULTURE: ABNORMAL
URINE TYPE: ABNORMAL
UROBILINOGEN, URINE: 0.2 E.U./DL
WBC UA: ABNORMAL /HPF (ref 0–5)

## 2021-09-28 ASSESSMENT — ENCOUNTER SYMPTOMS
ABDOMINAL PAIN: 0
VOMITING: 0
EYE PAIN: 0
SHORTNESS OF BREATH: 0
NAUSEA: 0
WHEEZING: 0
DIARRHEA: 0
COUGH: 0

## 2021-09-28 NOTE — ED NOTES
Patient discharged to home with family. Patient verbalized understanding of discharge instructions. Advised of when to return to ED and when to call 911. Advised of follow up with PCP and Cardiologu. Patient received 1 electronically filed Rx with education. Patient verbalized understanding of education. No questions from patient to this RN. Patient left ED without incident.       Jojo Arora RN  09/28/21 8119

## 2021-09-28 NOTE — ED PROVIDER NOTES
[address] ENCOUNTER          ATTENDING PHYSICIAN NOTE       Date of evaluation: 9/27/2021    Chief Complaint     Abnormal Lab (Primary care doctor sent patient to [facility] ED for abnormal lab. Hi K at 6.2. Patient has no complaints, only here because PCP told her to come. )      History of Present Illness     Christian Raphael is a 80 y.o. female who presents with a chief complaint of abnormal lab. The patient had normal screening labs performed by her primary care provider today and was noted to have elevated potassium at 6.2. Was referred here for further evaluation. States that she has no symptoms and feels well. Did have a complicated cholecystectomy in early August with placement of a biliary stent due to biliary leak. Did have some intermittent atrial fibrillation noted on her telemetry monitoring at that time. Denies any chest pain or palpitations. Was apparently in rate controlled A. fib at PCPs office today and was referred back to Dr. Jorja Cheadle for cardiology evaluation prior to having her stent removed in approximately 2 weeks. States she has been eating and drinking normally. Normal urine output. No vomiting. Is mostly taking her medications as prescribed except she is only taking metoprolol once a day. Review of Systems     Review of Systems   Constitutional: Negative for chills and fever. HENT: Negative for congestion. Eyes: Negative for pain. Respiratory: Negative for cough, shortness of breath and wheezing. Cardiovascular: Negative for chest pain and leg swelling. Gastrointestinal: Negative for abdominal pain, diarrhea, nausea and vomiting. Genitourinary: Negative for dysuria. Musculoskeletal: Negative for arthralgias. Skin: Negative for rash. Neurological: Negative for weakness and headaches. All other systems reviewed and are negative.       Past Medical, Surgical, Family, and Social History         Diagnosis Date    CVA (cerebral vascular accident) ([facility] 03/2019    Hypertension          Procedure Laterality Date    CHOLECYSTECTOMY, LAPAROSCOPIC N/A 08/06/2021    LAPAROSCOPIC SUBTOTAL CHOLECYSTECTOMY performed by Kaleigh Beach MD at [address] [place] [address] ERCP  8/12/2021    ERCP SPHINCTER/PAPILLOTOMY performed by Emily Pineda MD at [facility] ERCP  8/12/2021    ERCP ENDOSCOPIC RETROGRADE CHOLANGIOPANCREATOGRAPHY performed by Emily Pineda MD at [facility] ERCP  8/12/2021    ERCP DILATION BALLOON performed by Emily Pineda MD at [facility] ERCP  8/12/2021    ERCP STENT INSERTION performed by Emily Pineda MD at [place] [address]     Her family history includes Diabetes in her brother. She reports that she has never smoked. She has never used smokeless tobacco. She reports that she does not drink alcohol and does not use drugs. Medications     Discharge Medication List as of 9/28/2021 12:35 AM      CONTINUE these medications which have NOT CHANGED    Details   lisinopril (PRINIVIL;ZESTRIL) 10 MG tablet Take 1 tablet by mouth daily, Disp-30 tablet, R-3Normal      aspirin 81 MG EC tablet Take 1 tablet by mouth daily, Disp-30 tablet, R-3Normal      atorvastatin (LIPITOR) 40 MG tablet Take 1 tablet by mouth nightly, Disp-30 tablet, R-3Normal      metoprolol tartrate (LOPRESSOR) 25 MG tablet Take 1 tablet by mouth 2 times daily, Disp-60 tablet, R-5Normal      amLODIPine (NORVASC) 10 MG tablet Take 1 tablet by mouth daily, Disp-30 tablet, R-3Normal             Allergies     She has No Known Allergies. Physical Exam     ED Triage Vitals [09/27/21 1952]   Enc Vitals Group      /72      Pulse 106      Resp 18      Temp 98.2 °F (36.8 °C)      Temp Source Oral      SpO2 96 %      Weight 147 lb (66.7 kg)      Height 5' 5\" (1.651 m)      Head Circumference       Peak Flow       Pain Score       Pain Loc       Pain Edu? Excl. in [address]?         General:  Non-toxic, no acute distress, fully cooperative with my exam    HEENT: NCAT    Neck:  Supple    Pulmonary:   No increased work of breathing; CTAB    Cardiac:  Mildly tachycardic with an irregularly irregular rhythm, no murmur, thrill or gallop. Capillary refill <3s. 2+ distal pulses    Abdomen:  Soft, nontender, nondistended; no focal rebound or guarding.   Laparoscopy scars are well-healed    Musculoskeletal:  Grossly intact without obvious injury or deformity    Neuro:  No focal motor deficits    Skin: No rashes      Diagnostic Results     EKG   Atrial fibrillation with mildly rapid ventricular response but no evidence of acute ischemia    RADIOLOGY:  No orders to display       LABS:   Results for orders placed or performed during the hospital encounter of 09/27/21   Comprehensive Metabolic Panel w/ Reflex to MG   Result Value Ref Range    Sodium 137 136 - 145 mmol/L    Potassium reflex Magnesium 5.0 3.5 - 5.1 mmol/L    Chloride 103 99 - 110 mmol/L    CO2 20 (L) 21 - 32 mmol/L    Anion Gap 14 3 - 16    Glucose 126 (H) 70 - 99 mg/dL    BUN 35 (H) 7 - 20 mg/dL    CREATININE 1.3 (H) 0.6 - 1.2 mg/dL    GFR Non-African American 39 (A) >60    GFR  47 (A) >60    Calcium 9.1 8.3 - 10.6 mg/dL    Total Protein 7.9 6.4 - 8.2 g/dL    Albumin 3.9 3.4 - 5.0 g/dL    Albumin/Globulin Ratio 1.0 (L) 1.1 - 2.2    Total Bilirubin 0.4 0.0 - 1.0 mg/dL    Alkaline Phosphatase 140 (H) 40 - 129 U/L    ALT 18 10 - 40 U/L    AST 27 15 - 37 U/L    Globulin 4.0 g/dL   CBC auto differential   Result Value Ref Range    WBC 5.0 4.0 - 11.0 K/uL    RBC 4.16 4.00 - 5.20 M/uL    Hemoglobin 13.2 12.0 - 16.0 g/dL    Hematocrit 39.6 36.0 - 48.0 %    MCV 95.1 80.0 - 100.0 fL    MCH 31.7 26.0 - 34.0 pg    MCHC 33.3 31.0 - 36.0 g/dL    RDW 15.7 (H) 12.4 - 15.4 %    Platelets 158 628 - 662 K/uL    MPV 8.7 5.0 - 10.5 fL    Neutrophils % 63.9 %    Lymphocytes % 16.8 %    Monocytes % 15.4 %    Eosinophils % 3.2 %    Basophils % 0.7 %    Neutrophils Absolute 3.2 1.7 - 7.7 K/uL    Lymphocytes Absolute 0.8 (L) 1.0 - daily thereafter. Dispense:  74 tablet     Refill:  0       CONSULTS:  [address] [facility] / ASSESSMENT / Jazmin Forrest is a 80 y.o. female presents with concerns for abnormal lab. Her potassium was actually normal here and unclear what caused his elevation as I see no comment of hemolysis that this is the most likely explanation. Does have a very mild JAIME. Is given 500 mL of LR. Is noted to be in atrial fibrillation with mildly rapid ventricular response. Is given a single dose of 2 5 mg of oral metoprolol. She is supposed take this twice a day but has only been taking it once a day. Likely would be improved by taking it twice daily. Her heart rate has improved to the low 100s and mid 90s just with the single dose of oral metoprolol and I have encouraged her to continue taking her metoprolol twice daily. Given her upcoming procedure and a [id number] score of 6, I did contact cardiology to discuss optimal anticoagulation management. Dr. Evelyn Cm with cardiology recommended initiation of anticoagulation at this point. She is due to see Dr. Sagrario Gong for preprocedural clearance prior to the procedure and they can determine stopping anticoagulation prior to the procedure at that time. Does not appear to have any significant risk factors for anticoagulation at this point. Did have surgery but that was almost 8 weeks ago. Patient was comfortable with discharge and outpatient follow-up with return precautions. Clinical Impression     1. Atrial fibrillation, unspecified type [facility])        Disposition     PATIENT REFERRED TO:  Charmaine Carmona, [address]          Naz Guadalupe, [address]  [address]            DISCHARGE MEDICATIONS:  Discharge Medication List as of 9/28/2021 12:35 AM      START taking these medications    Details   apixaban (ELIQUIS) 5 MG TABS tablet Take 2 tablets by mouth twice daily for 7 days then take 1 tablet by mouth twice daily thereafter., Disp-74 tablet, R-0Normal             DISPOSITION Decision To Discharge 09/28/2021 12:26:11 AM       Clearance MD Otto  09/28/21 1950

## 2021-10-07 ENCOUNTER — OFFICE VISIT (OUTPATIENT)
Dept: CARDIOLOGY CLINIC | Age: 86
End: 2021-10-07
Payer: MEDICARE

## 2021-10-07 VITALS
BODY MASS INDEX: 24.46 KG/M2 | SYSTOLIC BLOOD PRESSURE: 136 MMHG | HEART RATE: 83 BPM | DIASTOLIC BLOOD PRESSURE: 80 MMHG | WEIGHT: 147 LBS

## 2021-10-07 DIAGNOSIS — I48.91 ATRIAL FIBRILLATION, UNSPECIFIED TYPE (HCC): Primary | ICD-10-CM

## 2021-10-07 PROCEDURE — G8484 FLU IMMUNIZE NO ADMIN: HCPCS | Performed by: INTERNAL MEDICINE

## 2021-10-07 PROCEDURE — G8420 CALC BMI NORM PARAMETERS: HCPCS | Performed by: INTERNAL MEDICINE

## 2021-10-07 PROCEDURE — 1036F TOBACCO NON-USER: CPT | Performed by: INTERNAL MEDICINE

## 2021-10-07 PROCEDURE — 93000 ELECTROCARDIOGRAM COMPLETE: CPT | Performed by: INTERNAL MEDICINE

## 2021-10-07 PROCEDURE — 1123F ACP DISCUSS/DSCN MKR DOCD: CPT | Performed by: INTERNAL MEDICINE

## 2021-10-07 PROCEDURE — G8427 DOCREV CUR MEDS BY ELIG CLIN: HCPCS | Performed by: INTERNAL MEDICINE

## 2021-10-07 PROCEDURE — 99214 OFFICE O/P EST MOD 30 MIN: CPT | Performed by: INTERNAL MEDICINE

## 2021-10-07 PROCEDURE — 1090F PRES/ABSN URINE INCON ASSESS: CPT | Performed by: INTERNAL MEDICINE

## 2021-10-07 PROCEDURE — 4040F PNEUMOC VAC/ADMIN/RCVD: CPT | Performed by: INTERNAL MEDICINE

## 2021-10-07 NOTE — PROGRESS NOTES
mentioned in HPI. · Constitutional: there has been no unanticipated weight loss. · Eyes: No visual changes or diplopia. No scleral icterus. · ENT: No Headaches, hearing loss or vertigo. No mouth sores or sore throat. · Cardiovascular: No loss of consciousness. No hemoptysis, pleuritic pain, or phlebitis. · Respiratory: No cough or wheezing, no sputum production. No hematemesis. · Gastrointestinal: No abdominal pain, appetite loss, blood in stools. No change in bowel or bladder habits. · Genitourinary: No dysuria, or hematuria. · Musculoskeletal:  No gait disturbance, weakness or joint complaints. · Integumentary: No rash or pruritis. · Neurological: No headache, diplopia,numbness or tingling. No change in gait, balance, coordination, mood, affect, memory, mentation, behavior. · Psychiatric: No anxiety,  · Endocrine: No malaise,  · Hematologic/Lymphatic: No abnormal bruising  · Allergic/Immunologic: No nasal congestion      Physical Examination:    Vitals:    10/07/21 1551   BP: 136/80   Pulse: 83    Weight: 147 lb (66.7 kg)         General Appearance:  Alert, cooperative, no distress, appears stated age   Head:  Normocephalic, without obvious abnormality, atraumatic   Eyes:  PERRL   Nose: Nares normal,   Neck: Supple, JVP normal   Lungs:   Clear to auscultation bilaterally, respirations unlabored   Chest Wall:  No tenderness or deformity   Heart:  Irregularly irregular  normal S1, S2 normal, no murmur, I/VI HSM   No rub. No S3 / S4  gallop   Abdomen:   Soft, non-tender, +bowel sounds   Extremities: no cyanosis, no clubbing , No edema   Pulses: Symmetric extremities   Skin: no gross lesions or rashes   Pysch: Normal mood and affect   Neurologic: No gross deficits.   CN II - XII grossly intact        Labs  CBC:   Lab Results   Component Value Date    WBC 5.0 09/27/2021    RBC 4.16 09/27/2021    HGB 13.2 09/27/2021    HCT 39.6 09/27/2021    MCV 95.1 09/27/2021    RDW 15.7 09/27/2021     09/27/2021 CMP:    Lab Results   Component Value Date     09/27/2021    K 5.0 09/27/2021     09/27/2021    CO2 20 09/27/2021    BUN 35 09/27/2021    CREATININE 1.3 09/27/2021    GFRAA 47 09/27/2021    AGRATIO 1.0 09/27/2021    LABGLOM 39 09/27/2021    GLUCOSE 126 09/27/2021    PROT 7.9 09/27/2021    CALCIUM 9.1 09/27/2021    BILITOT 0.4 09/27/2021    ALKPHOS 140 09/27/2021    AST 27 09/27/2021    ALT 18 09/27/2021     PT/INR:  No results found for: PTINR  No results for input(s): CKTOTAL, CKMB, TROPONINI in the last 72 hours. EKG: AFib with controlled rate. 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 needs tube out. Carotid stenossi. Recommendations:    I had the opportunity to review the clinical symptoms and presentation of Aisah Jay. I recommend that the patient undergo further cardiac evaluation  Tobacco use was discussed with the patient and educated on the negative effects. I have asked the patient to not utilize these agents. Could     Thank you for allowing to us to participate in the care or Aisha Jay. All questions and concerns were addressed to the patient/family. Alternatives to my treatment were discussed. The note was completed using EMR. Every effort was made to ensure accuracy; however, inadvertent computerized transcription errors may be present.        Lynne Birch MD [address] [facility]

## 2021-10-08 ENCOUNTER — TELEPHONE (OUTPATIENT)
Dept: CARDIOLOGY CLINIC | Age: 86
End: 2021-10-08

## 2021-10-08 NOTE — TELEPHONE ENCOUNTER
Roxann Dillard RN from [facility] where the patient will be having her procedure, states they need a little more information on the clearance letter. It needs to state the patient is Cleared for ERCP.     [address] [facility]  Fax: [phone]

## 2022-04-25 ENCOUNTER — OFFICE VISIT (OUTPATIENT)
Dept: CARDIOLOGY CLINIC | Age: 87
End: 2022-04-25
Payer: MEDICARE

## 2022-04-25 VITALS
SYSTOLIC BLOOD PRESSURE: 130 MMHG | DIASTOLIC BLOOD PRESSURE: 75 MMHG | HEART RATE: 100 BPM | WEIGHT: 143.8 LBS | BODY MASS INDEX: 23.93 KG/M2

## 2022-04-25 DIAGNOSIS — E78.5 HYPERLIPIDEMIA, UNSPECIFIED HYPERLIPIDEMIA TYPE: ICD-10-CM

## 2022-04-25 DIAGNOSIS — I48.19 PERSISTENT ATRIAL FIBRILLATION (HCC): Primary | ICD-10-CM

## 2022-04-25 DIAGNOSIS — I10 HTN (HYPERTENSION), BENIGN: ICD-10-CM

## 2022-04-25 PROCEDURE — 1123F ACP DISCUSS/DSCN MKR DOCD: CPT | Performed by: INTERNAL MEDICINE

## 2022-04-25 PROCEDURE — G8420 CALC BMI NORM PARAMETERS: HCPCS | Performed by: INTERNAL MEDICINE

## 2022-04-25 PROCEDURE — 1090F PRES/ABSN URINE INCON ASSESS: CPT | Performed by: INTERNAL MEDICINE

## 2022-04-25 PROCEDURE — 1036F TOBACCO NON-USER: CPT | Performed by: INTERNAL MEDICINE

## 2022-04-25 PROCEDURE — 99214 OFFICE O/P EST MOD 30 MIN: CPT | Performed by: INTERNAL MEDICINE

## 2022-04-25 PROCEDURE — 4040F PNEUMOC VAC/ADMIN/RCVD: CPT | Performed by: INTERNAL MEDICINE

## 2022-04-25 PROCEDURE — G8427 DOCREV CUR MEDS BY ELIG CLIN: HCPCS | Performed by: INTERNAL MEDICINE

## 2022-07-21 ENCOUNTER — TELEPHONE (OUTPATIENT)
Dept: CARDIOLOGY CLINIC | Age: 87
End: 2022-07-21

## 2022-07-21 NOTE — TELEPHONE ENCOUNTER
Spoke with MD's office,  per Dr Danika Murguia, pt may hold ASA, but has to continue with the Eliquis due to the pt being in Afib and has a history of a stroke. Pt needs to have a mechanical diet and not wear her dentures.

## 2022-07-22 NOTE — TELEPHONE ENCOUNTER
Spoke with son, informed him that pt should not stop the Eliquis. Pt has a history of a stoke and afib.   Son verbalized an understanding

## 2022-08-08 ENCOUNTER — OFFICE VISIT (OUTPATIENT)
Dept: CARDIOLOGY CLINIC | Age: 87
End: 2022-08-08
Payer: MEDICARE

## 2022-08-08 VITALS
SYSTOLIC BLOOD PRESSURE: 120 MMHG | WEIGHT: 144.2 LBS | DIASTOLIC BLOOD PRESSURE: 70 MMHG | HEART RATE: 111 BPM | BODY MASS INDEX: 24 KG/M2

## 2022-08-08 DIAGNOSIS — I63.81 CEREBROVASCULAR ACCIDENT (CVA) OF RIGHT THALAMUS (HCC): Primary | ICD-10-CM

## 2022-08-08 DIAGNOSIS — R07.9 CHEST PAIN, UNSPECIFIED TYPE: ICD-10-CM

## 2022-08-08 PROCEDURE — 93000 ELECTROCARDIOGRAM COMPLETE: CPT | Performed by: INTERNAL MEDICINE

## 2022-08-08 PROCEDURE — 1123F ACP DISCUSS/DSCN MKR DOCD: CPT | Performed by: INTERNAL MEDICINE

## 2022-08-08 PROCEDURE — G8427 DOCREV CUR MEDS BY ELIG CLIN: HCPCS | Performed by: INTERNAL MEDICINE

## 2022-08-08 PROCEDURE — G8420 CALC BMI NORM PARAMETERS: HCPCS | Performed by: INTERNAL MEDICINE

## 2022-08-08 PROCEDURE — 1090F PRES/ABSN URINE INCON ASSESS: CPT | Performed by: INTERNAL MEDICINE

## 2022-08-08 PROCEDURE — 99214 OFFICE O/P EST MOD 30 MIN: CPT | Performed by: INTERNAL MEDICINE

## 2022-08-08 PROCEDURE — 1036F TOBACCO NON-USER: CPT | Performed by: INTERNAL MEDICINE

## 2022-08-08 NOTE — PROGRESS NOTES
[facility] [address] [facility]        Reason for Consultation/Chief Complaint: \"Atrial Fibrillation\"       History of Present Illness:  rCissy Darby is a 80 y.o. patient who presented to the hospital with complaints of cholecystitis. No chest pain. AF     Past Medical History:   has a past medical history of CVA (cerebral vascular accident) ([address] and Hypertension. Surgical History:   has a past surgical history that includes Cholecystectomy, laparoscopic (N/A, 08/06/2021); ERCP (8/12/2021); ERCP (8/12/2021); ERCP (8/12/2021); and ERCP (8/12/2021). Social History:   reports that she has never smoked. She has never used smokeless tobacco. She reports that she does not drink alcohol and does not use drugs. Family History:  No evidence for sudden cardiac death or premature CAD       Physical Examination:    Vitals:    08/08/22 1406   BP: 120/70   Pulse: (!) 111    Weight: 144 lb 3.2 oz (65.4 kg)         General Appearance:  Alert, cooperative, no distress, appears stated age   Head:  Normocephalic, without obvious abnormality, atraumatic   Eyes:  PERRL   Nose: Nares normal,   Neck: Supple, JVP normal   Lungs:   Clear to auscultation bilaterally, respirations unlabored   Chest Wall:  No tenderness or deformity   Heart:  Irregularly irregular and tachy  normal S1, S2 normal, no murmur, I/VI HSM   No rub. No S3 / S4  gallop   Abdomen:   Soft, non-tender, +bowel sounds   Extremities: no cyanosis, no clubbing , No edema   Pulses: Symmetric extremities   Skin: no gross lesions or rashes   Pysch: Normal mood and affect   Neurologic: No gross deficits.   CN II - XII grossly intact        Labs  CBC:   Lab Results   Component Value Date/Time    WBC 5.0 09/27/2021 08:11 PM    RBC 4.16 09/27/2021 08:11 PM    HGB 13.2 09/27/2021 08:11 PM    HCT 39.6 09/27/2021 08:11 PM    MCV 95.1 09/27/2021 08:11 PM    RDW 15.7 09/27/2021 08:11 PM     09/27/2021 08:11 PM     CMP:    Lab Results   Component Value Date/Time     09/27/2021 08:11 PM    K 5.0 09/27/2021 08:11 PM     09/27/2021 08:11 PM    CO2 20 09/27/2021 08:11 PM    BUN 35 09/27/2021 08:11 PM    CREATININE 1.3 09/27/2021 08:11 PM    GFRAA 47 09/27/2021 08:11 PM    AGRATIO 1.0 09/27/2021 08:11 PM    LABGLOM 39 09/27/2021 08:11 PM    GLUCOSE 126 09/27/2021 08:11 PM    PROT 7.9 09/27/2021 08:11 PM    CALCIUM 9.1 09/27/2021 08:11 PM    BILITOT 0.4 09/27/2021 08:11 PM    ALKPHOS 140 09/27/2021 08:11 PM    AST 27 09/27/2021 08:11 PM    ALT 18 09/27/2021 08:11 PM     PT/INR:  No results found for: PTINR  No results for input(s): CKTOTAL, CKMB, TROPONINI in the last 72 hours. Diagnosis Orders   1. Cerebrovascular accident (CVA) of right thalamus ([address]  EKG 12 Lead      2. Chest pain, unspecified type  EKG 12 Lead        AF:  Controlled:  Continue metoprolol but increase to 50 mg bid and continue eliquis  HTN: controlled. HLP;  Stable.       Shruthi Wick MD [facility]

## 2022-11-15 NOTE — TELEPHONE ENCOUNTER
Requested Prescriptions     Pending Prescriptions Disp Refills    ELIQUIS 5 MG TABS tablet [Pharmacy Med Name: ELIQUIS 5MG TABLETS] 60 tablet 5     Sig: TAKE 1 TABLET BY MOUTH TWICE DAILY            Last Office Visit: 8/8/2022     Next Office Visit: 12/12/2022

## 2022-11-16 RX ORDER — APIXABAN 5 MG/1
TABLET, FILM COATED ORAL
Qty: 60 TABLET | Refills: 5 | Status: SHIPPED | OUTPATIENT
Start: 2022-11-16

## 2022-12-12 ENCOUNTER — OFFICE VISIT (OUTPATIENT)
Dept: CARDIOLOGY CLINIC | Age: 87
End: 2022-12-12
Payer: MEDICARE

## 2022-12-12 VITALS
HEART RATE: 68 BPM | SYSTOLIC BLOOD PRESSURE: 130 MMHG | BODY MASS INDEX: 24.3 KG/M2 | DIASTOLIC BLOOD PRESSURE: 70 MMHG | WEIGHT: 146 LBS

## 2022-12-12 DIAGNOSIS — E78.5 HYPERLIPIDEMIA, UNSPECIFIED HYPERLIPIDEMIA TYPE: ICD-10-CM

## 2022-12-12 DIAGNOSIS — I10 HTN (HYPERTENSION), BENIGN: ICD-10-CM

## 2022-12-12 DIAGNOSIS — I48.19 PERSISTENT ATRIAL FIBRILLATION (HCC): ICD-10-CM

## 2022-12-12 DIAGNOSIS — I25.10 CORONARY ARTERY DISEASE INVOLVING NATIVE CORONARY ARTERY OF NATIVE HEART WITHOUT ANGINA PECTORIS: Primary | ICD-10-CM

## 2022-12-12 PROCEDURE — G8427 DOCREV CUR MEDS BY ELIG CLIN: HCPCS | Performed by: INTERNAL MEDICINE

## 2022-12-12 PROCEDURE — G8420 CALC BMI NORM PARAMETERS: HCPCS | Performed by: INTERNAL MEDICINE

## 2022-12-12 PROCEDURE — 1123F ACP DISCUSS/DSCN MKR DOCD: CPT | Performed by: INTERNAL MEDICINE

## 2022-12-12 PROCEDURE — 1036F TOBACCO NON-USER: CPT | Performed by: INTERNAL MEDICINE

## 2022-12-12 PROCEDURE — 1090F PRES/ABSN URINE INCON ASSESS: CPT | Performed by: INTERNAL MEDICINE

## 2022-12-12 PROCEDURE — 99214 OFFICE O/P EST MOD 30 MIN: CPT | Performed by: INTERNAL MEDICINE

## 2022-12-12 PROCEDURE — G8484 FLU IMMUNIZE NO ADMIN: HCPCS | Performed by: INTERNAL MEDICINE

## 2022-12-12 RX ORDER — ACETAMINOPHEN 500 MG
500 TABLET ORAL EVERY 8 HOURS PRN
COMMUNITY

## 2022-12-12 NOTE — PROGRESS NOTES
[facility] [address] [facility]        Reason for Consultation/Chief Complaint: \"Atrial Fibrillation\"       History of Present Illness:  Christianne Hu is a 80 y.o. patient who presented to the hospital with complaints of cholecystitis. No chest pain. AF     Past Medical History:   has a past medical history of CVA (cerebral vascular accident) ([address] and Hypertension. Surgical History:   has a past surgical history that includes Cholecystectomy, laparoscopic (N/A, 08/06/2021); ERCP (8/12/2021); ERCP (8/12/2021); ERCP (8/12/2021); and ERCP (8/12/2021). Social History:   reports that she has never smoked. She has never used smokeless tobacco. She reports that she does not drink alcohol and does not use drugs. Family History:  No evidence for sudden cardiac death or premature CAD       Physical Examination:    Vitals:    12/12/22 1422   BP: 130/70   Pulse: 68    Weight: 146 lb (66.2 kg)       Exam unchanged 8/8/22  General Appearance:  Alert, cooperative, no distress, appears stated age   Head:  Normocephalic, without obvious abnormality, atraumatic   Eyes:  PERRL   Nose: Nares normal,   Neck: Supple, JVP normal   Lungs:   Clear to auscultation bilaterally, respirations unlabored   Chest Wall:  No tenderness or deformity   Heart:  Irregularly irregular and tachy  normal S1, S2 normal, no murmur, I/VI HSM   No rub. No S3 / S4  gallop   Abdomen:   Soft, non-tender, +bowel sounds   Extremities: no cyanosis, no clubbing , No edema   Pulses: Symmetric extremities   Skin: no gross lesions or rashes   Pysch: Normal mood and affect   Neurologic: No gross deficits.   CN II - XII grossly intact        Labs  CBC:   Lab Results   Component Value Date/Time    WBC 5.0 09/27/2021 08:11 PM    RBC 4.16 09/27/2021 08:11 PM    HGB 13.2 09/27/2021 08:11 PM    HCT 39.6 09/27/2021 08:11 PM    MCV 95.1 09/27/2021 08:11 PM    RDW 15.7 09/27/2021 08:11 PM     09/27/2021 08:11 PM     CMP:    Lab Results Component Value Date/Time     09/27/2021 08:11 PM    K 5.0 09/27/2021 08:11 PM     09/27/2021 08:11 PM    CO2 20 09/27/2021 08:11 PM    BUN 35 09/27/2021 08:11 PM    CREATININE 1.3 09/27/2021 08:11 PM    GFRAA 47 09/27/2021 08:11 PM    AGRATIO 1.0 09/27/2021 08:11 PM    LABGLOM 39 09/27/2021 08:11 PM    GLUCOSE 126 09/27/2021 08:11 PM    PROT 7.9 09/27/2021 08:11 PM    CALCIUM 9.1 09/27/2021 08:11 PM    BILITOT 0.4 09/27/2021 08:11 PM    ALKPHOS 140 09/27/2021 08:11 PM    AST 27 09/27/2021 08:11 PM    ALT 18 09/27/2021 08:11 PM     PT/INR:  No results found for: PTINR  No results for input(s): CKTOTAL, CKMB, TROPONINI in the last 72 hours. Diagnosis Orders   1. Coronary artery disease involving native coronary artery of native heart without angina pectoris        2. HTN (hypertension), benign        3. Hyperlipidemia, unspecified hyperlipidemia type        4. Persistent atrial fibrillation (HCC)          AF:  Controlled:  Continue metoprolol but increase to 50 mg bid and continue eliquis  HTN: controlled. HLP;  Stable.       Abhijit Oliver MD [facility]

## 2023-05-22 RX ORDER — APIXABAN 5 MG/1
TABLET, FILM COATED ORAL
Qty: 180 TABLET | Refills: 3 | Status: SHIPPED | OUTPATIENT
Start: 2023-05-22

## 2023-05-22 NOTE — TELEPHONE ENCOUNTER
Requested Prescriptions     Pending Prescriptions Disp Refills    ELIQUIS 5 MG TABS tablet [Pharmacy Med Name: ELIQUIS 5MG TABLETS] 180 tablet 3     Sig: TAKE 1 TABLET BY MOUTH TWICE DAILY            Last Office Visit: 12/12/2022     Next Office Visit: 6/19/2023

## 2023-06-19 ENCOUNTER — OFFICE VISIT (OUTPATIENT)
Dept: CARDIOLOGY CLINIC | Age: 88
End: 2023-06-19
Payer: MEDICARE

## 2023-06-19 VITALS
WEIGHT: 146 LBS | HEART RATE: 97 BPM | BODY MASS INDEX: 24.3 KG/M2 | SYSTOLIC BLOOD PRESSURE: 124 MMHG | DIASTOLIC BLOOD PRESSURE: 70 MMHG

## 2023-06-19 DIAGNOSIS — I25.10 CORONARY ARTERY DISEASE INVOLVING NATIVE CORONARY ARTERY OF NATIVE HEART WITHOUT ANGINA PECTORIS: ICD-10-CM

## 2023-06-19 DIAGNOSIS — I10 HTN (HYPERTENSION), BENIGN: Primary | ICD-10-CM

## 2023-06-19 PROCEDURE — 1123F ACP DISCUSS/DSCN MKR DOCD: CPT | Performed by: INTERNAL MEDICINE

## 2023-06-19 PROCEDURE — 1090F PRES/ABSN URINE INCON ASSESS: CPT | Performed by: INTERNAL MEDICINE

## 2023-06-19 PROCEDURE — 99214 OFFICE O/P EST MOD 30 MIN: CPT | Performed by: INTERNAL MEDICINE

## 2023-06-19 PROCEDURE — G8420 CALC BMI NORM PARAMETERS: HCPCS | Performed by: INTERNAL MEDICINE

## 2023-06-19 PROCEDURE — 1036F TOBACCO NON-USER: CPT | Performed by: INTERNAL MEDICINE

## 2023-06-19 PROCEDURE — G8427 DOCREV CUR MEDS BY ELIG CLIN: HCPCS | Performed by: INTERNAL MEDICINE

## 2023-06-19 NOTE — PROGRESS NOTES
[facility] [address] [facility]        Reason for Consultation/Chief Complaint: \"Atrial Fibrillation\"       History of Present Illness:  Roel Ace is a 80 y.o. patient who presented to the hospital with complaints of cholecystitis. No chest pain. AF. No orthopnea. Past Medical History:   has a past medical history of CVA (cerebral vascular accident) ([address] and Hypertension. Surgical History:   has a past surgical history that includes Cholecystectomy, laparoscopic (N/A, 08/06/2021); ERCP (8/12/2021); ERCP (8/12/2021); ERCP (8/12/2021); and ERCP (8/12/2021). Social History:   reports that she has never smoked. She has never used smokeless tobacco. She reports that she does not drink alcohol and does not use drugs. Family History:  No evidence for sudden cardiac death or premature CAD       Physical Examination:    Vitals:    06/19/23 1443   BP: 124/70   Pulse: 97    Weight - Scale: 146 lb (66.2 kg)       Exam unchanged 8/8/22  General Appearance:  Alert, cooperative, no distress, appears stated age   Head:  Normocephalic, without obvious abnormality, atraumatic   Eyes:  PERRL   Nose: Nares normal,   Neck: Supple, JVP normal   Lungs:   Clear to auscultation bilaterally, respirations unlabored   Chest Wall:  No tenderness or deformity   Heart:  Irregularly irregular and tachy  normal S1, S2 normal, no murmur, I/VI HSM   No rub. No S3 / S4  gallop   Abdomen:   Soft, non-tender, +bowel sounds   Extremities: no cyanosis, no clubbing , No edema   Pulses: Symmetric extremities   Skin: no gross lesions or rashes   Pysch: Normal mood and affect   Neurologic: No gross deficits.   CN II - XII grossly intact        Labs  CBC:   Lab Results   Component Value Date/Time    WBC 5.0 09/27/2021 08:11 PM    RBC 4.16 09/27/2021 08:11 PM    HGB 13.2 09/27/2021 08:11 PM    HCT 39.6 09/27/2021 08:11 PM    MCV 95.1 09/27/2021 08:11 PM    RDW 15.7 09/27/2021 08:11 PM     09/27/2021 08:11 PM

## 2023-12-14 NOTE — TELEPHONE ENCOUNTER
Requested Prescriptions     Pending Prescriptions Disp Refills    metoprolol tartrate (LOPRESSOR) 25 MG tablet [Pharmacy Med Name: METOPROLOL TARTRATE 25MG TABLETS] 360 tablet 3     Sig: TAKE 2 TABLETS BY MOUTH EVERY MORNING AND AT BEDTIME            Last Office Visit: 6/19/2023     Next Office Visit: 1/8/2024

## 2023-12-15 DIAGNOSIS — I48.91 ATRIAL FIBRILLATION, UNSPECIFIED TYPE (HCC): Primary | ICD-10-CM

## 2024-01-08 ENCOUNTER — OFFICE VISIT (OUTPATIENT)
Dept: CARDIOLOGY CLINIC | Age: 89
End: 2024-01-08
Payer: MEDICARE

## 2024-01-08 VITALS
SYSTOLIC BLOOD PRESSURE: 130 MMHG | DIASTOLIC BLOOD PRESSURE: 62 MMHG | BODY MASS INDEX: 23.63 KG/M2 | WEIGHT: 142 LBS | HEART RATE: 76 BPM

## 2024-01-08 DIAGNOSIS — I48.91 ATRIAL FIBRILLATION, UNSPECIFIED TYPE (HCC): ICD-10-CM

## 2024-01-08 PROCEDURE — G8484 FLU IMMUNIZE NO ADMIN: HCPCS | Performed by: INTERNAL MEDICINE

## 2024-01-08 PROCEDURE — 1036F TOBACCO NON-USER: CPT | Performed by: INTERNAL MEDICINE

## 2024-01-08 PROCEDURE — G8420 CALC BMI NORM PARAMETERS: HCPCS | Performed by: INTERNAL MEDICINE

## 2024-01-08 PROCEDURE — G8427 DOCREV CUR MEDS BY ELIG CLIN: HCPCS | Performed by: INTERNAL MEDICINE

## 2024-01-08 PROCEDURE — 99213 OFFICE O/P EST LOW 20 MIN: CPT | Performed by: INTERNAL MEDICINE

## 2024-01-08 PROCEDURE — 1123F ACP DISCUSS/DSCN MKR DOCD: CPT | Performed by: INTERNAL MEDICINE

## 2024-01-08 PROCEDURE — 1090F PRES/ABSN URINE INCON ASSESS: CPT | Performed by: INTERNAL MEDICINE

## 2024-01-08 NOTE — PROGRESS NOTES
[facility]  CARDIOLOGY  OUTPATIENT FOLLOWUP        Reason for Consultation/Chief Complaint: \"Atrial Fibrillation\"       History of Present Illness:  Imelda Marshall is a 89 y.o. patient who presented to the hospital with complaints of cholecystitis. No chest pain.  AF.  No orthopnea.      Past Medical History:   has a past medical history of CVA (cerebral vascular accident) (HCC) and Hypertension.    Surgical History:   has a past surgical history that includes Cholecystectomy, laparoscopic (N/A, 08/06/2021); ERCP (8/12/2021); ERCP (8/12/2021); ERCP (8/12/2021); and ERCP (8/12/2021).     Social History:   reports that she has never smoked. She has never used smokeless tobacco. She reports that she does not drink alcohol and does not use drugs.     Family History:  No evidence for sudden cardiac death or premature CAD       Physical Examination:    Vitals:    01/08/24 1324   BP: 130/62   Pulse: 76    Weight - Scale: 64.4 kg (142 lb)       Exam unchanged 8/8/22  General Appearance:  Alert, cooperative, no distress, appears stated age   Head:  Normocephalic, without obvious abnormality, atraumatic   Eyes:  PERRL   Nose: Nares normal,   Neck: Supple, JVP normal   Lungs:   Clear to auscultation bilaterally, respirations unlabored   Chest Wall:  No tenderness or deformity   Heart:  Irregularly irregular and tachy  normal S1, S2 normal, no murmur, I/VI HSM   No rub. No S3 / S4  gallop   Abdomen:   Soft, non-tender, +bowel sounds   Extremities: no cyanosis, no clubbing , No edema   Pulses: Symmetric extremities   Skin: no gross lesions or rashes   Pysch: Normal mood and affect   Neurologic: No gross deficits.  CN II - XII grossly intact        Labs  CBC:   Lab Results   Component Value Date/Time    WBC 5.0 09/27/2021 08:11 PM    RBC 4.16 09/27/2021 08:11 PM    HGB 13.2 09/27/2021 08:11 PM    HCT 39.6 09/27/2021 08:11 PM    MCV 95.1 09/27/2021 08:11 PM    RDW 15.7 09/27/2021 08:11 PM     09/27/2021 08:11 PM

## 2024-05-30 RX ORDER — APIXABAN 5 MG/1
TABLET, FILM COATED ORAL
Qty: 180 TABLET | Refills: 2 | Status: SHIPPED | OUTPATIENT
Start: 2024-05-30

## 2025-02-25 ENCOUNTER — TELEPHONE (OUTPATIENT)
Dept: CARDIOLOGY CLINIC | Age: 89
End: 2025-02-25

## 2025-02-25 NOTE — TELEPHONE ENCOUNTER
[facility] Medication Refills:    Medication  ELIQUIS 5 MG TABS tablet [[id number]]  ELIQUIS 5 MG TABS tablet [[id number]]    Order Details  Dose, Route, Frequency: As Directed   Dispense Quantity: 180 tablet Refills: 2          Sig: TAKE 1 TABLET BY MOUTH TWICE DAILY       Pharmacy    [id number] [address] [place] [phone] - F [phone]  [address] [facility] [address]  Phone: [phone]  Fax: [phone]         Last Office Visit: 01/08/24    Next Office Visit: 03/20/25    Last Refill: 05/30/24    Last Labs: 09/27/21

## 2025-03-17 NOTE — PATIENT INSTRUCTIONS
Thank you for choosing [facility] for your cardiac care.    During your visit today, we reviewed and confirmed your cardiac medications along with  medication prescribed by your other healthcare team members. Please be sure to discuss any  changes to medication with your providers.    Please bring a list of ALL medications (or the bottles) with you to EVERY appointment.  Also include vitamins and over-the-counter medications.    If you need refills for any cardiac medications, please call your pharmacy and they will reach out to us electronically.    Did your provider order testing today? If yes, then you will receive your results in three  possible ways. You can receive a [employer] message, a phone call, or letter in the mail. Please  note, if you are an active [employer] user, some of your testing will be available within 1-2 days.    Finally, please know that it is good for your heart to exercise and follow a healthy, low-fat diet  as advised by your physician and health care providers.    If you are experiencing a medical emergency, please call 911 immediately.    It's easy to register for a [employer] account if you don't already have one. With a [employer]  account you can manage your health record, view test results, schedule appointments and  more.     Dr. Christine's clinical staff can be reached at the following phone number: [phone]    If any cardiac testing is ordered, please contact central scheduling at [phone] to get your test scheduled.     Stop Aspirin  Decrease Eliquis to 2.5 mg twice a day

## 2025-03-17 NOTE — PROGRESS NOTES
[facility]     Outpatient Cardiology         Patient Name:  Imelda Marshall  Primary Care Physician: Yenifer Cullen MD  03/20/25     Assessment & Plan    Assessment / Plan:     Persistent A-fib-rates are controlled.  Will stop aspirin.  Reduce Eliquis to 2.5 mg p.o. twice daily.  Essential hypertension-well-controlled.  Will consider reducing dose of Norvasc on follow-up.  Hyperlipidemia-stable.  Continue Lipitor.  Follow-up in 6 months.              Chief Complaint:     Chief Complaint   Patient presents with    Atrial Fibrillation       History of Present Illness:       HPI     Imelda Marshall is a 89 y.o. female with PMH of permanent  Afib, HTN and CVA here for a follow up. Previously seen by Dr Thompson.    Today she states she is doing well.   Patient denies any chest pain, shortness of breath, palpitations, presyncope or syncope. No TIA. No claudication. No recent hospitalizations    10/7/24 LDL 70    PMH  Past Medical History:   Diagnosis Date    CVA (cerebral vascular accident) (HCC) 03/2019    Hypertension        PSH  Past Surgical History:   Procedure Laterality Date    CHOLECYSTECTOMY, LAPAROSCOPIC N/A 08/06/2021    LAPAROSCOPIC SUBTOTAL CHOLECYSTECTOMY performed by Justin Henderson MD at [facility] OR    ERCP  8/12/2021    ERCP SPHINCTER/PAPILLOTOMY performed by Pal Leach MD at [facility] ENDOSCOPY    ERCP  8/12/2021    ERCP ENDOSCOPIC RETROGRADE CHOLANGIOPANCREATOGRAPHY performed by Pal Leach MD at [facility] ENDOSCOPY    ERCP  8/12/2021    ERCP DILATION BALLOON performed by Pal Leach MD at [facility] ENDOSCOPY    ERCP  8/12/2021    ERCP STENT INSERTION performed by Pal Leach MD at [facility] ENDOSCOPY        Social HIstory  Social History     Tobacco Use    Smoking status: Never    Smokeless tobacco: Never   Vaping Use    Vaping status: Never Used   Substance Use Topics    Alcohol use: No    Drug use: No       Family History  Family History   Problem Relation Age of Onset    Diabetes

## 2025-03-20 ENCOUNTER — OFFICE VISIT (OUTPATIENT)
Dept: CARDIOLOGY CLINIC | Age: 89
End: 2025-03-20
Payer: MEDICARE

## 2025-03-20 VITALS
HEIGHT: 65 IN | SYSTOLIC BLOOD PRESSURE: 122 MMHG | WEIGHT: 128 LBS | HEART RATE: 84 BPM | DIASTOLIC BLOOD PRESSURE: 80 MMHG | BODY MASS INDEX: 21.33 KG/M2

## 2025-03-20 DIAGNOSIS — I10 HTN (HYPERTENSION), BENIGN: ICD-10-CM

## 2025-03-20 DIAGNOSIS — I48.21 PERMANENT ATRIAL FIBRILLATION (HCC): Primary | ICD-10-CM

## 2025-03-20 PROCEDURE — 1159F MED LIST DOCD IN RCRD: CPT | Performed by: INTERNAL MEDICINE

## 2025-03-20 PROCEDURE — G8420 CALC BMI NORM PARAMETERS: HCPCS | Performed by: INTERNAL MEDICINE

## 2025-03-20 PROCEDURE — 93000 ELECTROCARDIOGRAM COMPLETE: CPT | Performed by: INTERNAL MEDICINE

## 2025-03-20 PROCEDURE — 99214 OFFICE O/P EST MOD 30 MIN: CPT | Performed by: INTERNAL MEDICINE

## 2025-03-20 PROCEDURE — G8427 DOCREV CUR MEDS BY ELIG CLIN: HCPCS | Performed by: INTERNAL MEDICINE

## 2025-03-20 PROCEDURE — 1036F TOBACCO NON-USER: CPT | Performed by: INTERNAL MEDICINE

## 2025-03-20 PROCEDURE — 1160F RVW MEDS BY RX/DR IN RCRD: CPT | Performed by: INTERNAL MEDICINE

## 2025-03-20 PROCEDURE — G2211 COMPLEX E/M VISIT ADD ON: HCPCS | Performed by: INTERNAL MEDICINE

## 2025-03-20 PROCEDURE — 1123F ACP DISCUSS/DSCN MKR DOCD: CPT | Performed by: INTERNAL MEDICINE

## 2025-03-20 PROCEDURE — 1090F PRES/ABSN URINE INCON ASSESS: CPT | Performed by: INTERNAL MEDICINE

## 2025-03-20 RX ORDER — KETOCONAZOLE 20 MG/ML
SHAMPOO, SUSPENSION TOPICAL
COMMUNITY
Start: 2024-10-07

## 2025-05-02 ENCOUNTER — TELEPHONE (OUTPATIENT)
Dept: CARDIOLOGY CLINIC | Age: 89
End: 2025-05-02

## 2025-05-02 DIAGNOSIS — I48.91 ATRIAL FIBRILLATION, UNSPECIFIED TYPE (HCC): ICD-10-CM

## 2025-05-02 RX ORDER — METOPROLOL TARTRATE 25 MG/1
25 TABLET, FILM COATED ORAL SEE ADMIN INSTRUCTIONS
Qty: 270 TABLET | Refills: 3 | Status: SHIPPED | OUTPATIENT
Start: 2025-05-02

## 2025-05-02 NOTE — TELEPHONE ENCOUNTER
Medication refill:     Medication  metoprolol tartrate (LOPRESSOR) 25 MG tablet [[id number]]  metoprolol tartrate (LOPRESSOR) 25 MG tablet [[id number]]    Order Details  Dose: 25 mg Route: Oral Frequency: SEE ADMIN INSTRUCTIONS   Dispense Quantity: 270 tablet Refills: 3          Sig: Take 2 tablets (50 mg) each AM and 1 tablet (25 mg) each PM     Pharmacy  [facility] [place] #[id number] - [place], [place] - [address]  [place] [phone] - F [phone]  [address] [facility] [address]  Phone: [phone]  Fax: [phone]     Last visit: 3/20/25  Next visit: 9/22/25  Refill: 1/8/24

## 2025-05-19 NOTE — TELEPHONE ENCOUNTER
Requested Prescriptions     Pending Prescriptions Disp Refills    ELIQUIS 5 MG TABS tablet [Pharmacy Med Name: ELIQUIS 5MG TABLETS] 180 tablet 2     Sig: TAKE 1 TABLET BY MOUTH TWICE DAILY            Checked Correct Pharmacy: Yes  Any changes since last refill? No     Number: 180  Refills: 3    Last OV: 3/20/2025 Provider: KEN  Next OV: 9/22/2025 Provider: KEN    Last Labs: 08/05/2021    CBC:  Lab Results   Component Value Date    WBC 5.0 09/27/2021    HGB 13.2 09/27/2021    HCT 39.6 09/27/2021    MCV 95.1 09/27/2021     09/27/2021    LYMPHOPCT 16.8 09/27/2021    RBC 4.16 09/27/2021    MCH 31.7 09/27/2021    MCHC 33.3 09/27/2021    RDW 15.7 (H) 09/27/2021              Contains abnormal data Basic Metabolic Panel w/ Reflex to MG  Order: [id number]   Status: Final result       Test Result Released: No    0 Result Notes           Component  Ref Range & Units (hover) 8/5/21 0229 8/3/21 0949 3/1/19 0600 2/28/19 0440 2/27/19 0424 2/26/19 1201   Sodium 136 134 Low  141 145 140 141   Potassium reflex Magnesium 3.8 3.9 4.3 4.0 4.0 4.1   Chloride 100 101 106 108 103 106   CO2 21 22 22 23 23 24   Anion Gap 15 11 13 14 14 11   Glucose 172 High  176 High  118 High  116 High  124 High  109 High    BUN 20 14 26 High  25 High  19 20   Creatinine 1.3 High  1.0 0.9 0.8 0.8 0.8   GFR Non- 39 Abnormal  52 Abnormal  CM 59 Abnormal  CM >60 CM >60 CM >60 CM   Comment: >60 mL/min/1.73m2 EGFR, calc. for ages 18 and older using the  MDRD formula (not corrected for weight), is valid for stable  renal function.   GFR  47 Abnormal  >60 CM >60 CM >60 CM >60 CM >60 CM   Comment: Chronic Kidney Disease: less than 60 ml/min/1.73 sq.m.          Kidney Failure: less than 15 ml/min/1.73 sq.m.  Results valid for patients 18 years and older.   Calcium 9.1 9.0 9.1 8.5 8.8 8.9   Resulting Agency  - [facility]  - [facility]  - [facility]

## (undated) DEVICE — JEWISH HOSPITAL TURNOVER KIT: Brand: MEDLINE INDUSTRIES, INC.

## (undated) DEVICE — CORD ES L10FT MPLR LAP

## (undated) DEVICE — APPLICATOR MEDICATED 26 CC SOLUTION HI LT ORNG CHLORAPREP

## (undated) DEVICE — PLATE ES AD W 9FT CRD 2

## (undated) DEVICE — DRAIN,WOUND,15FR,3/16,FULL-FLUTED: Brand: MEDLINE

## (undated) DEVICE — COVER LT HNDL BLU PLAS

## (undated) DEVICE — SUTURE MCRYL SZ 4-0 L27IN ABSRB UD L19MM PS-2 1/2 CIR PRIM Y426H

## (undated) DEVICE — DEVICE CLSR 10/12MM XL PRT SYS SUT PASS ST DISP CARTER

## (undated) DEVICE — TISSUE RETRIEVAL SYSTEM: Brand: INZII RETRIEVAL SYSTEM

## (undated) DEVICE — ERBE NESSY®PLATE 170 SPLIT; 168CM²; CABLE 3M: Brand: ERBE

## (undated) DEVICE — SOLUTION ANTIFOG VIS SYS CLEARIFY LAPSCP

## (undated) DEVICE — TOWEL,STOP FLAG GOLD N-W: Brand: MEDLINE

## (undated) DEVICE — 3M™ TEGADERM™ TRANSPARENT FILM DRESSING FRAME STYLE, 1626W, 4 IN X 4-3/4 IN (10 CM X 12 CM), 50/CT 4CT/CASE: Brand: 3M™ TEGADERM™

## (undated) DEVICE — RESERVOIR,SUCTION,100CC,SILICONE: Brand: MEDLINE

## (undated) DEVICE — AEGIS 1" DISK 4MM HOLE, PEEL OPEN: Brand: MEDLINE

## (undated) DEVICE — SYSTEM SMK EVAC LAP TBNG FILTER HSNG BENT STYL PNK SEE CLR

## (undated) DEVICE — RETRIEVAL BALLOON CATHETER: Brand: EXTRACTOR™ PRO RX

## (undated) DEVICE — LAPAROSCOPIC CHOLANGIOGRAM CATHETER: Brand: AMERICAN CATHETER CORP

## (undated) DEVICE — DRAPE,LAP,CHOLE,W/TROUGHS,STERILE: Brand: MEDLINE

## (undated) DEVICE — 40583 XL ADVANCED TRENDELENBURG POSITIONING KIT: Brand: 40583 XL ADVANCED TRENDELENBURG POSITIONING KIT

## (undated) DEVICE — CATH URETH 24FR DOVER RED LTX

## (undated) DEVICE — SYRINGE MED 5ML STD CLR PLAS LUERLOCK TIP N CTRL DISP

## (undated) DEVICE — C-ARM: Brand: UNBRANDED

## (undated) DEVICE — PUMP SUC IRR TBNG L10FT W/ HNDPC ASSEMB STRYKEFLOW 2

## (undated) DEVICE — GLOVE ORANGE PI 8   MSG9080

## (undated) DEVICE — TROCAR: Brand: KII OPTICAL ACCESS SYSTEM

## (undated) DEVICE — ADHESIVE SKIN CLSR 0.7ML TOP DERMBND ADV

## (undated) DEVICE — TROCAR: Brand: KII FIOS FIRST ENTRY

## (undated) DEVICE — SET EXTN PRIMING 4.9ML L30IN INCL SLDE CLMP SPIN M LUERLOCK

## (undated) DEVICE — GARMENT,MEDLINE,DVT,INT,CALF,MED, GEN2: Brand: MEDLINE

## (undated) DEVICE — SUTURE PERMA-HAND SZ 2-0 L30IN NONABSORBABLE BLK L26MM SH K833H

## (undated) DEVICE — NEEDLE HYPO 22GA L1.5IN BLK POLYPR HUB S STL REG BVL STR

## (undated) DEVICE — SURGICAL SET UP - SURE SET: Brand: MEDLINE INDUSTRIES, INC.

## (undated) DEVICE — SCISSORS ENDOSCP DIA5MM CRV MPLR CAUT W/ RATCH HNDL

## (undated) DEVICE — GLOVE ORANGE PI 8 1/2   MSG9085

## (undated) DEVICE — CLIP INT M L POLYMER LOK LIG HEM O LOK

## (undated) DEVICE — SOLUTION INJ LR VISIV 1000ML BG

## (undated) DEVICE — [HIGH FLOW INSUFFLATOR,  DO NOT USE IF PACKAGE IS DAMAGED,  KEEP DRY,  KEEP AWAY FROM SUNLIGHT,  PROTECT FROM HEAT AND RADIOACTIVE SOURCES.]: Brand: PNEUMOSURE

## (undated) DEVICE — LARGE BORE STOPCOCK WITH ROTATING MALE LUER LOCK

## (undated) DEVICE — SUTURE SZ 0 27IN 5/8 CIR UR-6  TAPER PT VIOLET ABSRB VICRYL J603H

## (undated) DEVICE — GOWN,SIRUS,POLYRNF,BRTHSLV,XL,30/CS: Brand: MEDLINE

## (undated) DEVICE — GOWN,SIRUS,POLYRNF,BRTHSLV,XLN/XXL,18/CS: Brand: MEDLINE

## (undated) DEVICE — SURE SET-DOUBLE BASIN-LF: Brand: MEDLINE INDUSTRIES, INC.

## (undated) DEVICE — CATHETER URET 4FR L70CM POLYUR WHSTL FLX TIP KINK RESIST W/

## (undated) DEVICE — ELECTROSURGICAL PENCIL ROCKER SWITCH NON COATED BLADE ELECTRODE 10 FT (3 M) CORD HOLSTER: Brand: MEGADYNE

## (undated) DEVICE — ELECTRODE LAP L36CM PTFE WIRE J HK CLEANCOAT

## (undated) DEVICE — SOLUTION IV 1000ML 0.9% SOD CHL

## (undated) DEVICE — SYRINGE 20ML LL S/C 50

## (undated) DEVICE — GOWN,SIRUS,POLYRNF,BRTHSLV,LG,30/CS: Brand: MEDLINE